# Patient Record
Sex: FEMALE | Race: WHITE | NOT HISPANIC OR LATINO | Employment: OTHER | URBAN - METROPOLITAN AREA
[De-identification: names, ages, dates, MRNs, and addresses within clinical notes are randomized per-mention and may not be internally consistent; named-entity substitution may affect disease eponyms.]

---

## 2021-07-13 ENCOUNTER — HOSPITAL ENCOUNTER (EMERGENCY)
Facility: HOSPITAL | Age: 71
Discharge: HOME | End: 2021-07-13
Attending: EMERGENCY MEDICINE | Admitting: EMERGENCY MEDICINE
Payer: COMMERCIAL

## 2021-07-13 ENCOUNTER — APPOINTMENT (EMERGENCY)
Dept: RADIOLOGY | Facility: HOSPITAL | Age: 71
End: 2021-07-13
Payer: COMMERCIAL

## 2021-07-13 ENCOUNTER — APPOINTMENT (EMERGENCY)
Dept: CT IMAGING | Facility: HOSPITAL | Age: 71
End: 2021-07-13
Payer: COMMERCIAL

## 2021-07-13 VITALS
RESPIRATION RATE: 18 BRPM | TEMPERATURE: 97.4 F | SYSTOLIC BLOOD PRESSURE: 157 MMHG | OXYGEN SATURATION: 96 % | DIASTOLIC BLOOD PRESSURE: 65 MMHG | HEART RATE: 65 BPM
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
## 2021-07-13 LAB
ALBUMIN SERPL BCP-MCNC: 3.7 G/DL (ref 3.5–5)
ALP SERPL-CCNC: 60 U/L (ref 46–116)
ALT SERPL W P-5'-P-CCNC: 28 U/L (ref 12–78)
ANION GAP SERPL CALCULATED.3IONS-SCNC: 9 MMOL/L (ref 4–13)
AST SERPL W P-5'-P-CCNC: 34 U/L (ref 5–45)
ATRIAL RATE: 53 BPM
ATRIAL RATE: 53 BPM
BASE EX.OXY STD BLDV CALC-SCNC: 48.7 % (ref 60–80)
BASE EXCESS BLDV CALC-SCNC: 1 MMOL/L
BASOPHILS # BLD AUTO: 0.03 THOUSANDS/ΜL (ref 0–0.1)
BASOPHILS NFR BLD AUTO: 0 % (ref 0–1)
BILIRUB SERPL-MCNC: 0.28 MG/DL (ref 0.2–1)
BILIRUB UR QL STRIP: NEGATIVE
BUN SERPL-MCNC: 26 MG/DL (ref 5–25)
CALCIUM SERPL-MCNC: 9.4 MG/DL (ref 8.3–10.1)
CHLORIDE SERPL-SCNC: 105 MMOL/L (ref 100–108)
CLARITY UR: CLEAR
CO2 SERPL-SCNC: 29 MMOL/L (ref 21–32)
COLOR UR: YELLOW
CREAT SERPL-MCNC: 1.16 MG/DL (ref 0.6–1.3)
EOSINOPHIL # BLD AUTO: 0.07 THOUSAND/ΜL (ref 0–0.61)
EOSINOPHIL NFR BLD AUTO: 1 % (ref 0–6)
ERYTHROCYTE [DISTWIDTH] IN BLOOD BY AUTOMATED COUNT: 13.4 % (ref 11.6–15.1)
GFR SERPL CREATININE-BSD FRML MDRD: 48 ML/MIN/1.73SQ M
GLUCOSE SERPL-MCNC: 160 MG/DL (ref 65–140)
GLUCOSE UR STRIP-MCNC: NEGATIVE MG/DL
HCO3 BLDV-SCNC: 28 MMOL/L (ref 24–30)
HCT VFR BLD AUTO: 38.3 % (ref 34.8–46.1)
HGB BLD-MCNC: 12.2 G/DL (ref 11.5–15.4)
HGB UR QL STRIP.AUTO: NEGATIVE
IMM GRANULOCYTES # BLD AUTO: 0.02 THOUSAND/UL (ref 0–0.2)
IMM GRANULOCYTES NFR BLD AUTO: 0 % (ref 0–2)
KETONES UR STRIP-MCNC: NEGATIVE MG/DL
LEUKOCYTE ESTERASE UR QL STRIP: NEGATIVE
LYMPHOCYTES # BLD AUTO: 1.71 THOUSANDS/ΜL (ref 0.6–4.47)
LYMPHOCYTES NFR BLD AUTO: 19 % (ref 14–44)
MAGNESIUM SERPL-MCNC: 1.7 MG/DL (ref 1.6–2.6)
MCH RBC QN AUTO: 28.9 PG (ref 26.8–34.3)
MCHC RBC AUTO-ENTMCNC: 31.9 G/DL (ref 31.4–37.4)
MCV RBC AUTO: 91 FL (ref 82–98)
MONOCYTES # BLD AUTO: 0.75 THOUSAND/ΜL (ref 0.17–1.22)
MONOCYTES NFR BLD AUTO: 8 % (ref 4–12)
NEUTROPHILS # BLD AUTO: 6.66 THOUSANDS/ΜL (ref 1.85–7.62)
NEUTS SEG NFR BLD AUTO: 72 % (ref 43–75)
NITRITE UR QL STRIP: NEGATIVE
NRBC BLD AUTO-RTO: 0 /100 WBCS
O2 CT BLDV-SCNC: 9 ML/DL
P AXIS: 64 DEGREES
P AXIS: 69 DEGREES
PCO2 BLDV: 54.7 MM HG (ref 42–50)
PH BLDV: 7.33 [PH] (ref 7.3–7.4)
PH UR STRIP.AUTO: 6 [PH]
PHOSPHATE SERPL-MCNC: 4.7 MG/DL (ref 2.3–4.1)
PLATELET # BLD AUTO: 226 THOUSANDS/UL (ref 149–390)
PMV BLD AUTO: 11.1 FL (ref 8.9–12.7)
PO2 BLDV: 28.4 MM HG (ref 35–45)
POTASSIUM SERPL-SCNC: 4 MMOL/L (ref 3.5–5.3)
PR INTERVAL: 186 MS
PR INTERVAL: 210 MS
PROT SERPL-MCNC: 7 G/DL (ref 6.4–8.2)
PROT UR STRIP-MCNC: NEGATIVE MG/DL
QRS AXIS: 39 DEGREES
QRS AXIS: 77 DEGREES
QRSD INTERVAL: 114 MS
QRSD INTERVAL: 122 MS
QT INTERVAL: 464 MS
QT INTERVAL: 466 MS
QTC INTERVAL: 435 MS
QTC INTERVAL: 437 MS
RBC # BLD AUTO: 4.22 MILLION/UL (ref 3.81–5.12)
SODIUM SERPL-SCNC: 143 MMOL/L (ref 136–145)
SP GR UR STRIP.AUTO: 1.02 (ref 1–1.03)
T WAVE AXIS: 55 DEGREES
T WAVE AXIS: 66 DEGREES
TROPONIN I SERPL-MCNC: <0.02 NG/ML
TSH SERPL DL<=0.05 MIU/L-ACNC: 1.33 UIU/ML (ref 0.36–3.74)
UROBILINOGEN UR QL STRIP.AUTO: 0.2 E.U./DL
VENTRICULAR RATE: 53 BPM
VENTRICULAR RATE: 53 BPM
WBC # BLD AUTO: 9.24 THOUSAND/UL (ref 4.31–10.16)

## 2021-07-13 PROCEDURE — 96360 HYDRATION IV INFUSION INIT: CPT

## 2021-07-13 PROCEDURE — 70450 CT HEAD/BRAIN W/O DYE: CPT

## 2021-07-13 PROCEDURE — 93005 ELECTROCARDIOGRAM TRACING: CPT

## 2021-07-13 PROCEDURE — 99285 EMERGENCY DEPT VISIT HI MDM: CPT | Performed by: EMERGENCY MEDICINE

## 2021-07-13 PROCEDURE — 84484 ASSAY OF TROPONIN QUANT: CPT | Performed by: EMERGENCY MEDICINE

## 2021-07-13 PROCEDURE — 82805 BLOOD GASES W/O2 SATURATION: CPT | Performed by: EMERGENCY MEDICINE

## 2021-07-13 PROCEDURE — 71046 X-RAY EXAM CHEST 2 VIEWS: CPT

## 2021-07-13 PROCEDURE — 93010 ELECTROCARDIOGRAM REPORT: CPT | Performed by: INTERNAL MEDICINE

## 2021-07-13 PROCEDURE — 99285 EMERGENCY DEPT VISIT HI MDM: CPT

## 2021-07-13 PROCEDURE — 36415 COLL VENOUS BLD VENIPUNCTURE: CPT | Performed by: EMERGENCY MEDICINE

## 2021-07-13 PROCEDURE — 84443 ASSAY THYROID STIM HORMONE: CPT | Performed by: EMERGENCY MEDICINE

## 2021-07-13 PROCEDURE — 85025 COMPLETE CBC W/AUTO DIFF WBC: CPT | Performed by: EMERGENCY MEDICINE

## 2021-07-13 PROCEDURE — 84100 ASSAY OF PHOSPHORUS: CPT | Performed by: EMERGENCY MEDICINE

## 2021-07-13 PROCEDURE — 81003 URINALYSIS AUTO W/O SCOPE: CPT | Performed by: EMERGENCY MEDICINE

## 2021-07-13 PROCEDURE — 80053 COMPREHEN METABOLIC PANEL: CPT | Performed by: EMERGENCY MEDICINE

## 2021-07-13 PROCEDURE — 83735 ASSAY OF MAGNESIUM: CPT | Performed by: EMERGENCY MEDICINE

## 2021-07-13 RX ORDER — GLIMEPIRIDE 4 MG/1
4 TABLET ORAL
COMMUNITY

## 2021-07-13 RX ORDER — GABAPENTIN 600 MG/1
300 TABLET ORAL 2 TIMES DAILY
COMMUNITY

## 2021-07-13 RX ORDER — METOPROLOL SUCCINATE 50 MG/1
75 TABLET, EXTENDED RELEASE ORAL DAILY
COMMUNITY

## 2021-07-13 RX ORDER — HYDROCHLOROTHIAZIDE 12.5 MG/1
12.5 TABLET ORAL DAILY
COMMUNITY

## 2021-07-13 RX ORDER — OXYBUTYNIN CHLORIDE 5 MG/1
5 TABLET, EXTENDED RELEASE ORAL DAILY
COMMUNITY

## 2021-07-13 RX ORDER — CLONAZEPAM 0.5 MG/1
0.5 TABLET ORAL 3 TIMES DAILY PRN
COMMUNITY
End: 2021-08-21

## 2021-07-13 RX ORDER — IRBESARTAN 300 MG/1
300 TABLET ORAL
COMMUNITY
End: 2021-08-21

## 2021-07-13 RX ORDER — ATORVASTATIN CALCIUM 20 MG/1
20 TABLET, FILM COATED ORAL DAILY
COMMUNITY
End: 2021-08-21

## 2021-07-13 RX ORDER — OMEPRAZOLE 40 MG/1
40 CAPSULE, DELAYED RELEASE ORAL DAILY
COMMUNITY

## 2021-07-13 RX ORDER — FENOFIBRATE 145 MG/1
145 TABLET, COATED ORAL DAILY
COMMUNITY
End: 2021-08-21

## 2021-07-13 RX ADMIN — SODIUM CHLORIDE 1000 ML: 0.9 INJECTION, SOLUTION INTRAVENOUS at 18:44

## 2021-07-13 NOTE — ED NOTES
Pt  Informed that we need a urine sample pt  States "I don't have to go "  Family aware that we are waiting for a urine sample and if the urine is free of infection the doctor plans on sending her home  Pt  Son states "NO! I will not take her home, she needs 24 hour care and I have a life I can't do that " This RN spoke with pt  About calling [facility] and he states "I have done that for two months, they want money, this is neglect she can't stay here, I can't take care of her "  Son will not allow this RN to talk  He is talking about her accounts that were hacked and he was going to get POA today but she fell  Pt  Son states "this has been going on for about 6 months but over the past 36 hours she has gotten worse and you people want to send her home "  This RN informed pt  Son that pt  Is able to walk on her own         Tammie Godinez RN  07/13/21 0087

## 2021-07-13 NOTE — ED NOTES
Pt  Flor Olivares to stand on her own with no assistance, Pt  Able to ambulate with a steady gait approximately 6 feet in room, pt  able to return to bed and lift her own legs into bed without assistance         Mary Jane Domingo RN  07/13/21 2784

## 2021-07-13 NOTE — ED NOTES
Pt  Son states "this has been going on for about 6 months to a year but has been getting a little worse lately "      Aki Mercer, JAMES  07/13/21 1600

## 2021-07-13 NOTE — ED PROVIDER NOTES
History  Chief Complaint   Patient presents with    Dizziness     Per son patient has been having difficulty walking and with balance over the last 36 hours  Son states he found his mother on the floor this morning, per patient she fell last evening and couldn't get up     Difficulty Walking    Fall     Patient is a 81-year-old female  She has been having problems for about a year  She has had increased difficulty ambulating  She has been shaky  She has been falling  Denies injury  She has also been falling asleep readily  Sometime she will be sitting in a chair, fall asleep, and fall over  There has been some confusion  Family members are worried about possibility of dementia  There are no lateralizing motor or sensory complaints  No visual or speech problems  No vertigo  She denies feeling dizzy  She has been fatigued  She smokes  No alcohol or drug abuse  There is a history of colon cancer, diabetes, hypertension and melanoma  There has not been any weight loss  She has had a good appetite  No fever or cough  Prior to Admission Medications   Prescriptions Last Dose Informant Patient Reported?  Taking?   atorvastatin (LIPITOR) 20 mg tablet 7/13/2021 at Unknown time Child Yes Yes   Sig: Take 20 mg by mouth daily   clonazePAM (KlonoPIN) 0 5 mg tablet  Child Yes Yes   Sig: Take 0 5 mg by mouth 3 (three) times a day as needed for seizures   fenofibrate (TRICOR) 145 mg tablet 7/13/2021 at Unknown time Child Yes Yes   Sig: Take 145 mg by mouth daily   gabapentin (NEURONTIN) 600 MG tablet  Child Yes Yes   Sig: Take 600 mg by mouth 2 (two) times a day   glimepiride (AMARYL) 4 mg tablet 7/13/2021 at Unknown time Child Yes Yes   Sig: Take 4 mg by mouth daily with breakfast   hydrochlorothiazide (HYDRODIURIL) 12 5 mg tablet 7/13/2021 at Unknown time Child Yes Yes   Sig: Take 12 5 mg by mouth daily   irbesartan (AVAPRO) 300 mg tablet 7/12/2021 at Unknown time Child Yes Yes   Sig: Take 300 mg by mouth daily at bedtime   metFORMIN (GLUCOPHAGE) 1000 MG tablet 7/13/2021 at Unknown time Child Yes Yes   Sig: Take 1,000 mg by mouth 2 (two) times a day with meals   metoprolol succinate (TOPROL-XL) 50 mg 24 hr tablet  Child Yes Yes   Sig: Take 75 mg by mouth daily   omeprazole (PriLOSEC) 40 MG capsule  Child Yes Yes   Sig: Take 40 mg by mouth daily   oxybutynin (DITROPAN-XL) 5 mg 24 hr tablet 7/12/2021 at Unknown time Child Yes Yes   Sig: Take 5 mg by mouth daily   sitaGLIPtin (JANUVIA) 100 mg tablet  Child Yes Yes   Sig: Take 100 mg by mouth daily      Facility-Administered Medications: None       Past Medical History:   Diagnosis Date    [facility] ([facility] )     Diabetes mellitus ([facility] )     Hypertension     Skin cancer (melanoma) ([place] [address] )        Past Surgical History:   Procedure Laterality Date    APPENDECTOMY      BACK SURGERY      CHOLECYSTECTOMY         History reviewed  No pertinent family history  I have reviewed and agree with the history as documented  E-Cigarette/Vaping     E-Cigarette/Vaping Substances     Social History     Tobacco Use    Smoking status: Current Every Day Smoker    Smokeless tobacco: Never Used   Substance Use Topics    Alcohol use: Never    Drug use: Never       Review of Systems   Constitutional: Negative for chills and fever  HENT: Negative for rhinorrhea and sore throat  Eyes: Negative for pain, redness and visual disturbance  Respiratory: Negative for cough and shortness of breath  Cardiovascular: Negative for chest pain and leg swelling  Gastrointestinal: Negative for abdominal pain, diarrhea and vomiting  Endocrine: Negative for polydipsia and polyuria  Genitourinary: Negative for dysuria, enuresis, frequency, hematuria and vaginal bleeding  Musculoskeletal: Negative for back pain and neck pain  Skin: Negative for rash and wound  Allergic/Immunologic: Negative for immunocompromised state  Neurological: Positive for weakness   Negative for numbness and headaches  Hematological: Does not bruise/bleed easily  Psychiatric/Behavioral: Negative for hallucinations and suicidal ideas  All other systems reviewed and are negative  Physical Exam  Physical Exam  Vitals reviewed  Constitutional:       General: She is not in acute distress  HENT:      Head: Normocephalic and atraumatic  Nose: Nose normal       Mouth/Throat:      Mouth: Mucous membranes are moist    Eyes:      General:         Right eye: No discharge  Left eye: No discharge  Extraocular Movements: Extraocular movements intact  Conjunctiva/sclera: Conjunctivae normal       Pupils: Pupils are equal, round, and reactive to light  Cardiovascular:      Rate and Rhythm: Normal rate and regular rhythm  Pulses: Normal pulses  Heart sounds: Normal heart sounds  No murmur heard  No friction rub  No gallop  Pulmonary:      Effort: Pulmonary effort is normal  No respiratory distress  Breath sounds: Normal breath sounds  No stridor  No wheezing, rhonchi or rales  Abdominal:      General: Bowel sounds are normal  There is no distension  Palpations: Abdomen is soft  Tenderness: There is no abdominal tenderness  There is no right CVA tenderness, left CVA tenderness, guarding or rebound  Musculoskeletal:         General: No swelling, tenderness, deformity or signs of injury  Normal range of motion  Cervical back: Normal range of motion and neck supple  No rigidity  Right lower leg: No edema  Left lower leg: No edema  Comments: No calf tenderness or unilateral leg swelling  Skin:     General: Skin is warm and dry  Coloration: Skin is not jaundiced  Findings: No rash  Neurological:      General: No focal deficit present  Mental Status: She is alert and oriented to person, place, and time  Sensory: No sensory deficit  Motor: Motor function is intact     Psychiatric:         Mood and Affect: Mood normal  Behavior: Behavior normal          Vital Signs  ED Triage Vitals [07/13/21 1355]   Temperature Pulse Respirations Blood Pressure SpO2   (!) 97 4 °F (36 3 °C) (!) 53 18 (!) 151/108 100 %      Temp Source Heart Rate Source Patient Position - Orthostatic VS BP Location FiO2 (%)   Temporal Monitor Sitting Left arm --      Pain Score       --           Vitals:    07/13/21 1630 07/13/21 1717 07/13/21 1720 07/13/21 1722   BP: (!) 180/73 158/70 161/74 151/65   Pulse: (!) 50 (!) 52 66 65   Patient Position - Orthostatic VS: Sitting Lying - Orthostatic VS Sitting - Orthostatic VS Standing for 3 minutes - Orthostatic VS         Visual Acuity  Visual Acuity      Most Recent Value   L Pupil Size (mm)  3   R Pupil Size (mm)  3          ED Medications  Medications   sodium chloride 0 9 % bolus 1,000 mL (1,000 mL Intravenous New Bag 7/13/21 1844)       Diagnostic Studies  Results Reviewed     Procedure Component Value Units Date/Time    TSH [[id number]]  (Normal) Collected: 07/13/21 1540    Lab Status: Final result Specimen: Blood from Arm, Left Updated: 07/13/21 1616     TSH 3RD GENERATON 1 333 uIU/mL     Narrative:      Patients undergoing fluorescein dye angiography may retain small amounts of fluorescein in the body for 48-72 hours post procedure  Samples containing fluorescein can produce falsely depressed TSH values  If the patient had this procedure,a specimen should be resubmitted post fluorescein clearance        Magnesium [[id number]]  (Normal) Collected: 07/13/21 1540    Lab Status: Final result Specimen: Blood from Arm, Left Updated: 07/13/21 1616     Magnesium 1 7 mg/dL     Phosphorus [[id number]]  (Abnormal) Collected: 07/13/21 1540    Lab Status: Final result Specimen: Blood from Arm, Left Updated: 07/13/21 1616     Phosphorus 4 7 mg/dL     Comprehensive metabolic panel [[id number]]  (Abnormal) Collected: 07/13/21 1540    Lab Status: Final result Specimen: Blood from Arm, Left Updated: 07/13/21 1608     Sodium 143 mmol/L      Potassium 4 0 mmol/L      Chloride 105 mmol/L      CO2 29 mmol/L      ANION GAP 9 mmol/L      BUN 26 mg/dL      Creatinine 1 16 mg/dL      Glucose 160 mg/dL      Calcium 9 4 mg/dL      AST 34 U/L      ALT 28 U/L      Alkaline Phosphatase 60 U/L      Total Protein 7 0 g/dL      Albumin 3 7 g/dL      Total Bilirubin 0 28 mg/dL      eGFR 48 ml/min/1 73sq m     Narrative:      National Kid[employer] Disease Foundation guidelines for Chronic Kidney Disease (CKD):     Stage 1 with normal or high GFR (GFR > 90 mL/min/1 73 square meters)    Stage 2 Mild CKD (GFR = 60-89 mL/min/1 73 square meters)    Stage 3A Moderate CKD (GFR = 45-59 mL/min/1 73 square meters)    Stage 3B Moderate CKD (GFR = 30-44 mL/min/1 73 square meters)    Stage 4 Severe CKD (GFR = 15-29 mL/min/1 73 square meters)    Stage 5 End Stage CKD (GFR <15 mL/min/1 73 square meters)  Note: GFR calculation is accurate only with a steady state creatinine    Troponin I [[id number]]  (Normal) Collected: 07/13/21 1540    Lab Status: Final result Specimen: Blood from Arm, Left Updated: 07/13/21 1604     Troponin I <0 02 ng/mL     Blood gas, venous [[id number]]  (Abnormal) Collected: 07/13/21 1540    Lab Status: Final result Specimen: Blood from Arm, Left Updated: 07/13/21 1548     pH, Torres 7 327     pCO2, Torres 54 7 mm Hg      pO2, Torres 28 4 mm Hg      HCO3, Torres 28 0 mmol/L      Base Excess, Torres 1 0 mmol/L      O2 Content, Torres 9 0 ml/dL      O2 HGB, VENOUS 48 7 %     CBC and differential [[id number]] Collected: 07/13/21 1540    Lab Status: Final result Specimen: Blood from Arm, Left Updated: 07/13/21 1547     WBC 9 24 Thousand/uL      RBC 4 22 Million/uL      Hemoglobin 12 2 g/dL      Hematocrit 38 3 %      MCV 91 fL      MCH 28 9 pg      MCHC 31 9 g/dL      RDW 13 4 %      MPV 11 1 fL      Platelets 222 Thousands/uL      nRBC 0 /100 WBCs      Neutrophils Relative 72 %      Immat GRANS % 0 %      Lymphocytes Relative 19 %      Monocytes Relative 8 % Eosinophils Relative 1 %      Basophils Relative 0 %      Neutrophils Absolute 6 66 Thousands/µL      Immature Grans Absolute 0 02 Thousand/uL      Lymphocytes Absolute 1 71 Thousands/µL      Monocytes Absolute 0 75 Thousand/µL      Eosinophils Absolute 0 07 Thousand/µL      Basophils Absolute 0 03 Thousands/µL     UA w Reflex to Microscopic w Reflex to Culture [[id number]]     Lab Status: No result Specimen: Urine                  XR chest 2 views   ED Interpretation by Cheral Lefort, MD (07/13 1813)   No masses  No infiltrates  CT head without contrast   Final Result by Domenica Patino MD (07/13 1538)      No acute intracranial abnormality  Microangiopathic changes  Workstation performed: [id number]                    Procedures  ECG 12 Lead Documentation Only    Date/Time: 7/13/2021 6:51 PM  Performed by: Cheral Lefort, MD  Authorized by: Cheral Lefort, MD     ECG reviewed by me, the ED Provider: yes    Patient location:  ED  Comments:      Sinus bradycardia with 1st degree A-V block  Non-specific intra-ventricular conduction delay  Borderline ECG  No previous ECGs available             ED Course                             SBIRT 20yo+      Most Recent Value   SBIRT (25 yo +)   In order to provide better care to our patients, we are screening all of our patients for alcohol and drug use  Would it be okay to ask you these screening questions? Yes Filed at: 07/13/2021 1617   Initial Alcohol Screen: US AUDIT-C    1  How often do you have a drink containing alcohol?  0 Filed at: 07/13/2021 1617   2  How many drinks containing alcohol do you have on a typical day you are drinking? 0 Filed at: 07/13/2021 1617   3b  FEMALE Any Age, or MALE 65+: How often do you have 4 or more drinks on one occassion? 0 Filed at: 07/13/2021 1617   Audit-C Score  0 Filed at: 07/13/2021 1617   DIANA: How many times in the past year have you       Used an illegal drug or used a prescription medication for non-medical reasons? Never Filed at: 07/13/2021 1617                    MDM    Disposition  Final diagnoses:   None     ED Disposition     None      Follow-up Information    None         Patient's Medications   Discharge Prescriptions    No medications on file     No discharge procedures on file      PDMP Review     None          ED Provider  Electronically Signed by           Clementina Shea MD  07/13/21 9030

## 2021-07-19 ENCOUNTER — TELEPHONE (OUTPATIENT)
Dept: NEUROLOGY | Facility: CLINIC | Age: 71
End: 2021-07-19

## 2021-07-19 NOTE — TELEPHONE ENCOUNTER
Best contact number for patient:     Emergency Contact name and number:None    Referring provider and telephone number: Self Ref    Primary Care Provider Name and if affiliated with [facility]: Lucia Hassan    Reason for Appointment/Dx:Tremor Akosua Manners     Have you seen and followed up with a pediatric Neurologist for this disease in the past?  No     (If yes ok to schedule with Dr Rivera Members)    Neurology Location patient would like to be seen:[place]    Order received? Self Ref                                                 Records Received? No    Have you ever seen another Neurologist?       No      Insurance [address] [facility]     ID/Policy #:[id number]    Secondary Insurance:    ID/Policy#: Workman's Comp/ Accident/ School  Information      Workman's Comp/Accident/School related?        None     If yes name of Insurance company:    Claim #:    Date of Injury:    Type of Injury:     Name and Telephone Number:    Notes:Appointment schedule with patient insurance confimed                    Appointment date: 11-23-21 3:30pm with Dr Julianne Slaughter

## 2021-08-21 ENCOUNTER — APPOINTMENT (EMERGENCY)
Dept: RADIOLOGY | Facility: HOSPITAL | Age: 71
End: 2021-08-21
Payer: COMMERCIAL

## 2021-08-21 ENCOUNTER — HOSPITAL ENCOUNTER (EMERGENCY)
Facility: HOSPITAL | Age: 71
Discharge: HOME | End: 2021-08-21
Attending: EMERGENCY MEDICINE | Admitting: EMERGENCY MEDICINE
Payer: COMMERCIAL

## 2021-08-21 VITALS
OXYGEN SATURATION: 98 % | SYSTOLIC BLOOD PRESSURE: 116 MMHG | HEART RATE: 68 BPM | TEMPERATURE: 97.9 F | RESPIRATION RATE: 18 BRPM | HEIGHT: 62 IN | DIASTOLIC BLOOD PRESSURE: 63 MMHG

## 2021-08-21 DIAGNOSIS — M25.461 KNEE EFFUSION, RIGHT: ICD-10-CM

## 2021-08-21 DIAGNOSIS — M25.561 RIGHT KNEE PAIN: Primary | ICD-10-CM

## 2021-08-21 PROCEDURE — 99284 EMERGENCY DEPT VISIT MOD MDM: CPT | Performed by: EMERGENCY MEDICINE

## 2021-08-21 PROCEDURE — 73564 X-RAY EXAM KNEE 4 OR MORE: CPT

## 2021-08-21 PROCEDURE — 99283 EMERGENCY DEPT VISIT LOW MDM: CPT

## 2021-08-21 RX ORDER — ESCITALOPRAM OXALATE 20 MG/1
20 TABLET ORAL DAILY
COMMUNITY
End: 2021-08-21

## 2021-08-21 RX ORDER — MAGNESIUM OXIDE 400 MG/1
400 TABLET ORAL DAILY
COMMUNITY
Start: 2021-06-22 | End: 2022-06-22

## 2021-08-21 RX ORDER — ALBUTEROL SULFATE 90 UG/1
AEROSOL, METERED RESPIRATORY (INHALATION)
Status: ON HOLD | COMMUNITY
Start: 2021-07-11 | End: 2022-03-01

## 2021-08-21 RX ORDER — BUTALBITAL, ACETAMINOPHEN AND CAFFEINE 50; 325; 40 MG/1; MG/1; MG/1
1 TABLET ORAL EVERY 4 HOURS PRN
COMMUNITY
End: 2022-02-09

## 2021-08-21 RX ORDER — AMITRIPTYLINE HYDROCHLORIDE 50 MG/1
50 TABLET, FILM COATED ORAL
COMMUNITY
Start: 2021-06-04 | End: 2021-08-21

## 2021-08-21 RX ORDER — AMLODIPINE BESYLATE 2.5 MG/1
2.5 TABLET ORAL DAILY
COMMUNITY
Start: 2021-07-27

## 2021-08-21 RX ORDER — AMITRIPTYLINE HYDROCHLORIDE 50 MG/1
50 TABLET, FILM COATED ORAL
Status: ON HOLD | COMMUNITY
End: 2022-04-19 | Stop reason: ALTCHOICE

## 2021-08-21 RX ORDER — IRBESARTAN 300 MG/1
300 TABLET ORAL DAILY
COMMUNITY
Start: 2021-07-21

## 2021-08-21 NOTE — ED PROVIDER NOTES
Pt Name: Lai Eastman  MRN: [id number]  [place] 1950  Age/Sex: 78 y o  female  Date of evaluation: 8/21/2021  PCP: Avery Villegas MD    [address] [place] [address]    Chief Complaint   Patient presents with    Knee Swelling     Patient reports R knee swelling starting about 6 weeks ago  HPI    78 y o  female presenting with right knee pain and swelling  Patient states she fell approximately 6 weeks ago, landing on the right knee  She states since then she had swelling and pain  The pain is mild to moderate, dull, on the lateral aspect of the knee, worse with walking or pressing on and better at rest   Patient is still able to walk without difficulty or assistance  She presents today because she feels the swelling is been going on too long  She denies fever, nausea, vomiting, diarrhea, numbness, weakness, other symptoms  HPI      Past Medical and Surgical History    Past Medical History:   Diagnosis Date    Colon cancer ([facility] )     Diabetes mellitus ([facility] )     Hypertension     Skin cancer (melanoma) ([facility] )        Past Surgical History:   Procedure Laterality Date    APPENDECTOMY      BACK SURGERY      CHOLECYSTECTOMY         History reviewed  No pertinent family history  Social History     Tobacco Use    Smoking status: Current Every Day Smoker    Smokeless tobacco: Never Used   Substance Use Topics    Alcohol use: Never    Drug use: Never           Allergies    Allergies   Allergen Reactions    Codeine GI Intolerance    Morphine GI Intolerance       Home Medications    Prior to Admission medications    Medication Sig Start Date End Date Taking?  Authorizing Provider   gabapentin (NEURONTIN) 600 MG tablet Take 600 mg by mouth 2 (two) times a day    Historical Provider, MD   glimepiride (AMARYL) 4 mg tablet Take 4 mg by mouth daily with breakfast    Historical Provider, MD   hydrochlorothiazide (HYDRODIURIL) 12 5 mg tablet Take 12 5 mg by mouth daily    Historical Provider, MD irbesartan (AVAPRO) 300 mg tablet Take 300 mg by mouth daily at bedtime    Historical Provider, MD   metFORMIN (GLUCOPHAGE) 1000 MG tablet Take 1,000 mg by mouth 2 (two) times a day with meals    Historical Provider, MD   metoprolol succinate (TOPROL-XL) 50 mg 24 hr tablet Take 75 mg by mouth daily    Historical Provider, MD   omeprazole (PriLOSEC) 40 MG capsule Take 40 mg by mouth daily    Historical Provider, MD   oxybutynin (DITROPAN-XL) 5 mg 24 hr tablet Take 5 mg by mouth daily    Historical Provider, MD   sitaGLIPtin (JANUVIA) 100 mg tablet Take 100 mg by mouth daily    Historical Provider, MD   atorvastatin (LIPITOR) 20 mg tablet Take 20 mg by mouth daily  8/21/21  Historical Provider, MD   clonazePAM (KlonoPIN) 0 5 mg tablet Take 0 5 mg by mouth 3 (three) times a day as needed for seizures  8/21/21  Historical Provider, MD   fenofibrate (TRICOR) 145 mg tablet Take 145 mg by mouth daily  8/21/21  Historical Provider, MD           Review of Systems    Review of Systems   Constitutional: Negative for activity change, chills and fever  HENT: Negative for drooling and facial swelling  Eyes: Negative for pain, discharge and visual disturbance  Respiratory: Negative for apnea, cough, chest tightness, shortness of breath and wheezing  Cardiovascular: Negative for chest pain and leg swelling  Gastrointestinal: Negative for abdominal pain, constipation, diarrhea, nausea and vomiting  Genitourinary: Negative for difficulty urinating, dysuria and urgency  Musculoskeletal: Positive for arthralgias and joint swelling  Negative for back pain and gait problem  Skin: Negative for color change and rash  Neurological: Negative for dizziness, speech difficulty, weakness and headaches  Psychiatric/Behavioral: Negative for agitation, behavioral problems and confusion  All other systems reviewed and negative      Physical Exam      ED Triage Vitals [08/21/21 1701]   Temperature Pulse Respirations Blood Pressure SpO2   97 9 °F (36 6 °C) 68 18 116/63 98 %      Temp Source Heart Rate Source Patient Position - Orthostatic VS BP Location FiO2 (%)   Temporal Monitor Sitting Left arm --      Pain Score       7               Physical Exam  Vitals and nursing note reviewed  Constitutional:       Appearance: She is well-developed  HENT:      Head: Normocephalic and atraumatic  Right Ear: External ear normal       Left Ear: External ear normal    Eyes:      Conjunctiva/sclera: Conjunctivae normal       Pupils: Pupils are equal, round, and reactive to light  Cardiovascular:      Rate and Rhythm: Normal rate and regular rhythm  Heart sounds: Normal heart sounds  Pulmonary:      Effort: Pulmonary effort is normal  No respiratory distress  Breath sounds: Normal breath sounds  No wheezing or rales  Abdominal:      General: There is no distension  Palpations: Abdomen is soft  Tenderness: There is no abdominal tenderness  There is no guarding or rebound  Musculoskeletal:         General: Swelling and tenderness present  No deformity  Normal range of motion  Cervical back: Normal range of motion and neck supple  Comments: Right knee swollen, mildly tender to palpation about the lateral aspect  Negative Piter, no pain with axial loading, no pain with varus or valgus stress or with anterior drawer  Strength sensation pulse and cap refill intact  No erythema or other skin changes  Full active and passive range of motion  Skin:     General: Skin is warm and dry  Findings: No erythema or rash  Neurological:      Mental Status: She is alert and oriented to person, place, and time  Psychiatric:         Behavior: Behavior normal          Thought Content:  Thought content normal          Judgment: Judgment normal               Diagnostic Results      Labs:    Results Reviewed     None          All labs reviewed and utilized in the medical decision making process    Radiology:    XR knee 4+ vw right injury    (Results Pending)       All radiology studies independently viewed by me and interpreted by the radiologist     Procedure    Procedures        ED Course of Care and Re-Assessments      Patient offered crutches but declined  Medications - No data to display        FINAL IMPRESSION    Final diagnoses:   Right knee pain   Knee effusion, right         DISPOSITION/PLAN    Right knee pain and effusion as above  Plain films performed and showed no acute fracture or dislocation, no unstable injury noted  Do not suspect septic arthritis or significant neurovascular compromise at this time  Treated symptomatically, discharged strict return precautions follow up Orthopedics  Time reflects when diagnosis was documented in both MDM as applicable and the Disposition within this note     Time User Action Codes Description Comment    8/21/2021  7:25 PM Nicholas Blades Add [O38 [id number]] Right knee pain     8/21/2021  7:25 PM Nicholas Blades Add [N06 [id number]] Knee effusion, right       ED Disposition     ED Disposition Condition Date/Time Comment    Discharge Stable Sat Aug 21, 2021  7:25 PM Edgar Santillan discharge to home/self care              Follow-up Information     Follow up With Specialties Details Why Contact Info Additional [address] [facility] Emergency Department Emergency Medicine Go to  If symptoms worsen [address] [facility] [address] [facility] Emergency Department, [address] [place], [place], [address] Shirley Brown MD Internal Medicine Call  As needed [address]  [phone]       1 [facility] Orthopedic Surgery Call in 2 days To schedule close outpatient follow-up for your knee injury [address] [facility] [address] Alvarez  [facility] [place], [address] [address] [facility], [place], [place], [address]            PATIENT REFERRED TO:    [facility] Emergency Department  [address] [address] [phone]  Go to   If symptoms worsen    Teodora Kiran MD  [address] [facility]  [phone]    Call   As needed    [address] [facility] [place]  [address] [place] 91  [phone]  Call in 2 days  To schedule close outpatient follow-up for your knee injury      DISCHARGE MEDICATIONS:    Discharge Medication List as of 8/21/2021  7:26 PM      START taking these medications    Details   Diclofenac Sodium (VOLTAREN) 1 % Apply 2 g topically 4 (four) times a day, Starting Sat 8/21/2021, Print         CONTINUE these medications which have NOT CHANGED    Details   amitriptyline (ELAVIL) 50 mg tablet Take 50 mg by mouth daily at bedtime, Historical Med      amLODIPine (NORVASC) 2 5 mg tablet Take 2 5 mg by mouth daily, Starting Tue 7/27/2021, Historical Med      irbesartan (AVAPRO) 300 mg tablet Take 300 mg by mouth daily, Starting Wed 7/21/2021, Historical Med      magnesium oxide (MAG-OX) 400 mg tablet Take 400 mg by mouth daily, Starting Tue 6/22/2021, Until Wed 6/22/2022, Historical Med      albuterol (PROVENTIL HFA,VENTOLIN HFA) 90 mcg/act inhaler INHALE 2 PUFFS BY MOUTH EVERY 4-6 HOURS AS NEEDED FOR SHORTNESS OF BREATH OR WHEEZING, Historical Med      butalbital-acetaminophen-caffeine (FIORICET,ESGIC) -40 mg per tablet Take 1 tablet by mouth every 4 (four) hours as needed, Historical Med      gabapentin (NEURONTIN) 600 MG tablet Take 300 mg by mouth 2 (two) times a day , Historical Med      glimepiride (AMARYL) 4 mg tablet Take 4 mg by mouth daily with breakfast, Historical Med      hydrochlorothiazide (HYDRODIURIL) 12 5 mg tablet Take 12 5 mg by mouth daily, Historical Med      metFORMIN (GLUCOPHAGE) 1000 MG tablet Take 1,000 mg by mouth daily with breakfast , Historical Med      metoprolol succinate (TOPROL-XL) 50 mg 24 hr tablet Take 75 mg by mouth daily, Historical Med      omeprazole (PriLOSEC) 40 MG capsule Take 40 mg by mouth daily, Historical Med      oxybutynin (DITROPAN-XL) 5 mg 24 hr tablet Take 5 mg by mouth daily, Historical Med      sitaGLIPtin (JANUVIA) 100 mg tablet Take 100 mg by mouth daily, Historical Med             No discharge procedures on file           MD Anne Birmingham MD  08/21/21 4119

## 2021-08-25 NOTE — RESULT ENCOUNTER NOTE
Patient's son called and notified of knee x-ray results  Discussed need for MRI to further differentiate the lesion seen on x-ray  Advised f/u with ortho  Son expressed understanding  Call back complete

## 2021-10-29 ENCOUNTER — HOSPITAL ENCOUNTER (OUTPATIENT)
Dept: MAMMOGRAPHY | Facility: CLINIC | Age: 71
Discharge: HOME | End: 2021-10-29
Payer: COMMERCIAL

## 2021-10-29 VITALS — HEIGHT: 62 IN | WEIGHT: 158 LBS | BODY MASS INDEX: 29.08 KG/M2

## 2021-10-29 DIAGNOSIS — Z12.31 ENCOUNTER FOR SCREENING MAMMOGRAM FOR MALIGNANT NEOPLASM OF BREAST: ICD-10-CM

## 2021-10-29 PROCEDURE — 77063 BREAST TOMOSYNTHESIS BI: CPT

## 2021-10-29 PROCEDURE — 77067 SCR MAMMO BI INCL CAD: CPT

## 2021-11-09 ENCOUNTER — TELEPHONE (OUTPATIENT)
Dept: MAMMOGRAPHY | Facility: CLINIC | Age: 71
End: 2021-11-09

## 2021-11-16 ENCOUNTER — HOSPITAL ENCOUNTER (OUTPATIENT)
Dept: ULTRASOUND IMAGING | Facility: CLINIC | Age: 71
Discharge: HOME | End: 2021-11-16
Payer: COMMERCIAL

## 2021-11-16 ENCOUNTER — HOSPITAL ENCOUNTER (OUTPATIENT)
Dept: MAMMOGRAPHY | Facility: CLINIC | Age: 71
Discharge: HOME | End: 2021-11-16
Payer: COMMERCIAL

## 2021-11-16 ENCOUNTER — TELEPHONE (OUTPATIENT)
Dept: NEUROLOGY | Facility: CLINIC | Age: 71
End: 2021-11-16

## 2021-11-16 VITALS — WEIGHT: 158 LBS | HEIGHT: 62 IN | BODY MASS INDEX: 29.08 KG/M2

## 2021-11-16 DIAGNOSIS — R92.8 ABNORMAL MAMMOGRAM: ICD-10-CM

## 2021-11-16 PROCEDURE — G0279 TOMOSYNTHESIS, MAMMO: HCPCS

## 2021-11-16 PROCEDURE — 77065 DX MAMMO INCL CAD UNI: CPT

## 2021-11-16 PROCEDURE — 76642 ULTRASOUND BREAST LIMITED: CPT

## 2021-11-24 ENCOUNTER — OFFICE VISIT (OUTPATIENT)
Dept: NEUROLOGY | Facility: CLINIC | Age: 71
End: 2021-11-24
Payer: COMMERCIAL

## 2021-11-24 VITALS
TEMPERATURE: 96.9 F | HEART RATE: 68 BPM | HEIGHT: 62 IN | WEIGHT: 151 LBS | BODY MASS INDEX: 27.79 KG/M2 | SYSTOLIC BLOOD PRESSURE: 120 MMHG | DIASTOLIC BLOOD PRESSURE: 80 MMHG

## 2021-11-24 DIAGNOSIS — R41.3 MEMORY DIFFICULTY: ICD-10-CM

## 2021-11-24 DIAGNOSIS — R25.1 TREMOR: Primary | ICD-10-CM

## 2021-11-24 PROCEDURE — 99204 OFFICE O/P NEW MOD 45 MIN: CPT | Performed by: PSYCHIATRY & NEUROLOGY

## 2021-11-24 RX ORDER — PEN NEEDLE, DIABETIC 32GX 5/32"
NEEDLE, DISPOSABLE MISCELLANEOUS
COMMUNITY
Start: 2021-10-08 | End: 2022-02-09

## 2021-12-21 ENCOUNTER — HOSPITAL ENCOUNTER (OUTPATIENT)
Dept: MRI IMAGING | Facility: HOSPITAL | Age: 71
Discharge: HOME | End: 2021-12-21
Attending: PSYCHIATRY & NEUROLOGY
Payer: COMMERCIAL

## 2021-12-21 ENCOUNTER — APPOINTMENT (OUTPATIENT)
Dept: LAB | Facility: HOSPITAL | Age: 71
End: 2021-12-21
Payer: COMMERCIAL

## 2021-12-21 DIAGNOSIS — R25.1 TREMOR: ICD-10-CM

## 2021-12-21 DIAGNOSIS — E55.9 VITAMIN D DEFICIENCY: ICD-10-CM

## 2021-12-21 DIAGNOSIS — E11.59 HYPERTENSION ASSOCIATED WITH DIABETES (HCC): ICD-10-CM

## 2021-12-21 DIAGNOSIS — E11.69 TYPE 2 DIABETES MELLITUS WITH HYPERLIPIDEMIA (HCC): ICD-10-CM

## 2021-12-21 DIAGNOSIS — E78.5 TYPE 2 DIABETES MELLITUS WITH HYPERLIPIDEMIA (HCC): ICD-10-CM

## 2021-12-21 DIAGNOSIS — I15.2 HYPERTENSION ASSOCIATED WITH DIABETES (HCC): ICD-10-CM

## 2021-12-21 DIAGNOSIS — R41.3 MEMORY DIFFICULTY: ICD-10-CM

## 2021-12-21 DIAGNOSIS — Z11.59 NEED FOR HEPATITIS C SCREENING TEST: ICD-10-CM

## 2021-12-21 DIAGNOSIS — E78.5 HYPERLIPIDEMIA, UNSPECIFIED HYPERLIPIDEMIA TYPE: ICD-10-CM

## 2021-12-21 LAB
25(OH)D3 SERPL-MCNC: 15 NG/ML (ref 30–100)
ALBUMIN SERPL BCP-MCNC: 3.8 G/DL (ref 3.5–5)
ALP SERPL-CCNC: 59 U/L (ref 46–116)
ALT SERPL W P-5'-P-CCNC: 25 U/L (ref 12–78)
ANION GAP SERPL CALCULATED.3IONS-SCNC: 9 MMOL/L (ref 4–13)
AST SERPL W P-5'-P-CCNC: 14 U/L (ref 5–45)
BASOPHILS # BLD AUTO: 0.03 THOUSANDS/ΜL (ref 0–0.1)
BASOPHILS NFR BLD AUTO: 0 % (ref 0–1)
BILIRUB SERPL-MCNC: 0.29 MG/DL (ref 0.2–1)
BUN SERPL-MCNC: 24 MG/DL (ref 5–25)
CALCIUM SERPL-MCNC: 9.1 MG/DL (ref 8.3–10.1)
CHLORIDE SERPL-SCNC: 97 MMOL/L (ref 100–108)
CHOLEST SERPL-MCNC: 113 MG/DL
CO2 SERPL-SCNC: 30 MMOL/L (ref 21–32)
CREAT SERPL-MCNC: 1.12 MG/DL (ref 0.6–1.3)
EOSINOPHIL # BLD AUTO: 0.1 THOUSAND/ΜL (ref 0–0.61)
EOSINOPHIL NFR BLD AUTO: 1 % (ref 0–6)
ERYTHROCYTE [DISTWIDTH] IN BLOOD BY AUTOMATED COUNT: 12.5 % (ref 11.6–15.1)
FOLATE SERPL-MCNC: >20 NG/ML (ref 3.1–17.5)
GFR SERPL CREATININE-BSD FRML MDRD: 50 ML/MIN/1.73SQ M
GLUCOSE P FAST SERPL-MCNC: 113 MG/DL (ref 65–99)
HCT VFR BLD AUTO: 38.3 % (ref 34.8–46.1)
HDLC SERPL-MCNC: 59 MG/DL
HGB BLD-MCNC: 12.4 G/DL (ref 11.5–15.4)
IMM GRANULOCYTES # BLD AUTO: 0.04 THOUSAND/UL (ref 0–0.2)
IMM GRANULOCYTES NFR BLD AUTO: 1 % (ref 0–2)
LDLC SERPL CALC-MCNC: 37 MG/DL (ref 0–100)
LYMPHOCYTES # BLD AUTO: 2.03 THOUSANDS/ΜL (ref 0.6–4.47)
LYMPHOCYTES NFR BLD AUTO: 23 % (ref 14–44)
MCH RBC QN AUTO: 29 PG (ref 26.8–34.3)
MCHC RBC AUTO-ENTMCNC: 32.4 G/DL (ref 31.4–37.4)
MCV RBC AUTO: 90 FL (ref 82–98)
MONOCYTES # BLD AUTO: 0.92 THOUSAND/ΜL (ref 0.17–1.22)
MONOCYTES NFR BLD AUTO: 10 % (ref 4–12)
NEUTROPHILS # BLD AUTO: 5.74 THOUSANDS/ΜL (ref 1.85–7.62)
NEUTS SEG NFR BLD AUTO: 65 % (ref 43–75)
NONHDLC SERPL-MCNC: 54 MG/DL
NRBC BLD AUTO-RTO: 0 /100 WBCS
PLATELET # BLD AUTO: 321 THOUSANDS/UL (ref 149–390)
PMV BLD AUTO: 10.2 FL (ref 8.9–12.7)
POTASSIUM SERPL-SCNC: 4.6 MMOL/L (ref 3.5–5.3)
PROT SERPL-MCNC: 7 G/DL (ref 6.4–8.2)
RBC # BLD AUTO: 4.27 MILLION/UL (ref 3.81–5.12)
SODIUM SERPL-SCNC: 136 MMOL/L (ref 136–145)
TRIGL SERPL-MCNC: 86 MG/DL
TSH SERPL DL<=0.05 MIU/L-ACNC: 2.18 UIU/ML (ref 0.36–3.74)
VIT B12 SERPL-MCNC: 342 PG/ML (ref 100–900)
WBC # BLD AUTO: 8.86 THOUSAND/UL (ref 4.31–10.16)

## 2021-12-21 PROCEDURE — 80053 COMPREHEN METABOLIC PANEL: CPT

## 2021-12-21 PROCEDURE — 84443 ASSAY THYROID STIM HORMONE: CPT

## 2021-12-21 PROCEDURE — 83036 HEMOGLOBIN GLYCOSYLATED A1C: CPT

## 2021-12-21 PROCEDURE — 80061 LIPID PANEL: CPT

## 2021-12-21 PROCEDURE — 82746 ASSAY OF FOLIC ACID SERUM: CPT

## 2021-12-21 PROCEDURE — G1004 CDSM NDSC: HCPCS

## 2021-12-21 PROCEDURE — 85025 COMPLETE CBC W/AUTO DIFF WBC: CPT

## 2021-12-21 PROCEDURE — 86592 SYPHILIS TEST NON-TREP QUAL: CPT

## 2021-12-21 PROCEDURE — 86803 HEPATITIS C AB TEST: CPT

## 2021-12-21 PROCEDURE — 82607 VITAMIN B-12: CPT

## 2021-12-21 PROCEDURE — 76377 3D RENDER W/INTRP POSTPROCES: CPT

## 2021-12-21 PROCEDURE — 70551 MRI BRAIN STEM W/O DYE: CPT

## 2021-12-21 PROCEDURE — 82306 VITAMIN D 25 HYDROXY: CPT

## 2021-12-21 PROCEDURE — 86618 LYME DISEASE ANTIBODY: CPT

## 2021-12-21 PROCEDURE — 36415 COLL VENOUS BLD VENIPUNCTURE: CPT

## 2021-12-22 LAB
B BURGDOR IGG+IGM SER-ACNC: 19
EST. AVERAGE GLUCOSE BLD GHB EST-MCNC: 131 MG/DL
HBA1C MFR BLD: 6.2 %
HCV AB SER QL: NORMAL
RPR SER QL: NORMAL

## 2021-12-23 ENCOUNTER — TELEPHONE (OUTPATIENT)
Dept: NEUROLOGY | Facility: CLINIC | Age: 71
End: 2021-12-23

## 2021-12-27 DIAGNOSIS — R41.3 MEMORY DIFFICULTY: Primary | ICD-10-CM

## 2021-12-27 RX ORDER — DONEPEZIL HYDROCHLORIDE 5 MG/1
5 TABLET, FILM COATED ORAL
Qty: 30 TABLET | Refills: 0 | Status: SHIPPED | OUTPATIENT
Start: 2021-12-27 | End: 2022-02-09

## 2021-12-27 RX ORDER — DONEPEZIL HYDROCHLORIDE 10 MG/1
10 TABLET, FILM COATED ORAL
Qty: 30 TABLET | Refills: 5 | Status: SHIPPED | OUTPATIENT
Start: 2021-12-27 | End: 2022-03-17 | Stop reason: SDUPTHER

## 2021-12-30 ENCOUNTER — HOSPITAL ENCOUNTER (OUTPATIENT)
Dept: MRI IMAGING | Facility: HOSPITAL | Age: 71
Discharge: HOME | End: 2021-12-30
Payer: COMMERCIAL

## 2021-12-30 VITALS — BODY MASS INDEX: 28.71 KG/M2 | HEIGHT: 62 IN | WEIGHT: 156 LBS

## 2021-12-30 DIAGNOSIS — R92.8 ABNORMAL MAMMOGRAM: ICD-10-CM

## 2021-12-30 PROCEDURE — A9585 GADOBUTROL INJECTION: HCPCS | Performed by: RADIOLOGY

## 2021-12-30 PROCEDURE — C8908 MRI W/O FOL W/CONT, BREAST,: HCPCS

## 2021-12-30 PROCEDURE — C8937 CAD BREAST MRI: HCPCS

## 2021-12-30 RX ADMIN — GADOBUTROL 6 ML: 604.72 INJECTION INTRAVENOUS at 09:15

## 2022-01-04 ENCOUNTER — TELEPHONE (OUTPATIENT)
Dept: MAMMOGRAPHY | Facility: CLINIC | Age: 72
End: 2022-01-04

## 2022-01-06 ENCOUNTER — TELEPHONE (OUTPATIENT)
Dept: MAMMOGRAPHY | Facility: CLINIC | Age: 72
End: 2022-01-06

## 2022-01-07 NOTE — PROGRESS NOTES
Call placed to patient (spoke with son) regarding recommendation for;    ___X__ RIGHT ___X___LEFT      __X___Ultrasound guided  ____X__Stereotactic breast biopsy  Additional questions answered at this time    __X___Verbalized understanding        Blood thinners: No: ___X__ Yes: _____ What:     Biopsy teaching sheet given:  _____yes __X__no (All teaching points discussed during call, pt with no questions at this time, pt adv to arrive at 200 for us then biopsy to follow)    Pt son given name/# for any further questions/needs    Pt son agreeable to a post procedure call and states we can give him biopsy results to her over the phone

## 2022-01-26 ENCOUNTER — HOSPITAL ENCOUNTER (OUTPATIENT)
Dept: ULTRASOUND IMAGING | Facility: CLINIC | Age: 72
Discharge: HOME | End: 2022-01-26
Payer: COMMERCIAL

## 2022-01-26 ENCOUNTER — HOSPITAL ENCOUNTER (OUTPATIENT)
Dept: MAMMOGRAPHY | Facility: CLINIC | Age: 72
Discharge: HOME | End: 2022-01-26
Payer: COMMERCIAL

## 2022-01-26 ENCOUNTER — TELEPHONE (OUTPATIENT)
Dept: NEUROLOGY | Facility: CLINIC | Age: 72
End: 2022-01-26

## 2022-01-26 VITALS
BODY MASS INDEX: 28.71 KG/M2 | DIASTOLIC BLOOD PRESSURE: 64 MMHG | WEIGHT: 156 LBS | SYSTOLIC BLOOD PRESSURE: 150 MMHG | HEIGHT: 62 IN

## 2022-01-26 VITALS — SYSTOLIC BLOOD PRESSURE: 174 MMHG | HEART RATE: 64 BPM | DIASTOLIC BLOOD PRESSURE: 80 MMHG

## 2022-01-26 DIAGNOSIS — R92.8 ABNORMAL MAMMOGRAM: ICD-10-CM

## 2022-01-26 DIAGNOSIS — R92.8 ABNORMAL MRI, BREAST: ICD-10-CM

## 2022-01-26 DIAGNOSIS — R92.8 ABNORMAL FINDINGS ON DIAGNOSTIC IMAGING OF BREAST: ICD-10-CM

## 2022-01-26 PROCEDURE — 88342 IMHCHEM/IMCYTCHM 1ST ANTB: CPT | Performed by: PATHOLOGY

## 2022-01-26 PROCEDURE — 88305 TISSUE EXAM BY PATHOLOGIST: CPT | Performed by: SPECIALIST

## 2022-01-26 PROCEDURE — A4648 IMPLANTABLE TISSUE MARKER: HCPCS

## 2022-01-26 PROCEDURE — 76642 ULTRASOUND BREAST LIMITED: CPT

## 2022-01-26 PROCEDURE — 88341 IMHCHEM/IMCYTCHM EA ADD ANTB: CPT | Performed by: SPECIALIST

## 2022-01-26 PROCEDURE — 19083 BX BREAST 1ST LESION US IMAG: CPT

## 2022-01-26 PROCEDURE — 88305 TISSUE EXAM BY PATHOLOGIST: CPT | Performed by: PATHOLOGY

## 2022-01-26 PROCEDURE — 88342 IMHCHEM/IMCYTCHM 1ST ANTB: CPT | Performed by: SPECIALIST

## 2022-01-26 PROCEDURE — 19081 BX BREAST 1ST LESION STRTCTC: CPT

## 2022-01-26 RX ORDER — LIDOCAINE HYDROCHLORIDE AND EPINEPHRINE BITARTRATE 20; .01 MG/ML; MG/ML
10 INJECTION, SOLUTION SUBCUTANEOUS ONCE
Status: COMPLETED | OUTPATIENT
Start: 2022-01-26 | End: 2022-01-26

## 2022-01-26 RX ORDER — LIDOCAINE HYDROCHLORIDE 10 MG/ML
5 INJECTION, SOLUTION EPIDURAL; INFILTRATION; INTRACAUDAL; PERINEURAL ONCE
Status: COMPLETED | OUTPATIENT
Start: 2022-01-26 | End: 2022-01-26

## 2022-01-26 RX ADMIN — LIDOCAINE HYDROCHLORIDE AND EPINEPHRINE 10 ML: 20; 10 INJECTION, SOLUTION INFILTRATION; PERINEURAL at 14:16

## 2022-01-26 RX ADMIN — LIDOCAINE HYDROCHLORIDE 5 ML: 10 INJECTION, SOLUTION EPIDURAL; INFILTRATION; INTRACAUDAL; PERINEURAL at 14:16

## 2022-01-26 RX ADMIN — LIDOCAINE HYDROCHLORIDE 5 ML: 10 INJECTION, SOLUTION EPIDURAL; INFILTRATION; INTRACAUDAL; PERINEURAL at 12:45

## 2022-01-26 NOTE — PROGRESS NOTES
Procedure type:    __x___ultrasound guided _____stereotactic    Breast:    _____Left ___x__Right    Location: Left Breast 400 2cmfn    Needle:Marquee 16 G    # of passes: 4    Clip: Cylinder    Performed by: Dr Susanne Macias held for 5 minutes by: Christiana Coreas RN    Steri Strips:    __x___yes _____no    Band aid:    ___x__yes_____no    Tape and guaze:    _____yes __x___no    Tolerated procedure:    __x___yes _____no

## 2022-01-26 NOTE — PROGRESS NOTES
Ice pack given:    ___x__yes _____no    Discharge instructions signed by patient:    __x___yes _____no    Discharge instructions given to patient:    _x____yes _____no    Discharged via:    __x___ambulatory    _____wheelchair    _____stretcher    Stable on discharge:    ___x__yes ____no

## 2022-01-26 NOTE — TELEPHONE ENCOUNTER
Pt's son Michael's phone number is [phone]  Left message for jack to call office back    Per chart pt was ordered 5mg for 1 month then pt is to increase to 10mg    aricept is available using a good rx coupon for less than $10

## 2022-01-26 NOTE — TELEPHONE ENCOUNTER
January 26, 2022             7:13 AM  Marvin Morocho routed this conversation to MD Joelle Orlando MD  to [facility] Clinical Team 6    RK      1:49 PM  I did tried to call the patient at the number listed below that was given by you it is a non working number, Aricept should be generic, other option would be is [employer] please let the patient's family know       Thank you

## 2022-01-26 NOTE — PROGRESS NOTES
Procedure type:    _____ultrasound guided ___x__stereotactic    Breast:    _____Left __x___Right    Location: Right Breast 100    Needle: Mammotome 8G    # of passes: 6    Clip: Triple Twist    Performed by: Dr Elizabeth Sánchez held for 5 minutes by: Crispin Dewitt RN    Steri Strips:    _x____yes _____no    Band aid:    __x___yes_____no    Tape and guaze:    _____yes __x___no    Tolerated procedure:    ___x__yes _____no

## 2022-01-26 NOTE — TELEPHONE ENCOUNTER
January 25, 2022    Brady Medici  to Chasidy Turk MD    DT      10:22 AM  Why hasnt the prescription been refilled for Kathi Wade?  Please call me       DONEPEZIL HCL 5 MG TABLET  Awaiting Prescriber Response  Awaiting Prescriber Response  Last filled on 12/27/2021     Balbina Side  Son and [address] [place] [address]

## 2022-01-26 NOTE — TELEPHONE ENCOUNTER
May Mcneal MD  to [facility] [place] Clinical Team 6    RK      12:12 PM  Please check with them the Aricept dosage the prescription of 5 mg has not been filled as patient is supposed to be on 10 mg

## 2022-01-26 NOTE — TELEPHONE ENCOUNTER
Vivek Ramirez  to Gabriel Alberts MD          3:57 PM  This message is being sent by Tavares Springer on behalf of Chris Xie      is there an alternative medication she can take thats cheaper? I had to have a long conversation with my mother today because she doesnt understand shes in capable of making decisions independently on her own any longer  she changed her insurance plan for January 1 and now that prescription is over $300 because she doesnt have a prescription plan through [address] [place] [address]   any longer  This is become a major issue for her excepting that shes no longer independently living  This goes on and on for the last year shes lived here but I had to explain to her thats why we had to move her here because her well-being was in jeopardy   I dont understand where to go from here

## 2022-01-27 NOTE — TELEPHONE ENCOUNTER
Pt's son Jaun Cat LVM to call  Spoke with son and he states that his mother has been changing her prescription coverage almost every month and states he was unaware of this  Pt's son states he intends to call [employer] to clear things up  He states pt unable to care for herself, was given power of   Pt's son to call back with update      Dr Lai Strickland

## 2022-01-27 NOTE — PROGRESS NOTES
Post procedure call completed with jonathan/POA 1/27/22 at 1048    Bleeding: _____yes __X___no    Pain: _____yes ___X___no    Redness/Swelling: ______yes ___X___no    Band aid removed: __X___yes _____no    Steri-Strips intact: ___X___yes _____no (discussed with patient to remove steri strips on day 5 if they have not come off on their own)    Pt with no questions at this time, adv will call when results available, adv to call with any questions or concerns, has name/# for contact

## 2022-01-28 ENCOUNTER — TELEPHONE (OUTPATIENT)
Dept: HEMATOLOGY ONCOLOGY | Facility: CLINIC | Age: 72
End: 2022-01-28

## 2022-01-28 ENCOUNTER — TELEPHONE (OUTPATIENT)
Dept: MAMMOGRAPHY | Facility: CLINIC | Age: 72
End: 2022-01-28

## 2022-01-28 NOTE — TELEPHONE ENCOUNTER
New Patient Intake Form   Patient Details:    Taqueria Duval  1950  [id number]    Appointment Information   Who is calling to schedule? [facility]   If not self, what is the caller's name? 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 Yes   Is patient aware of diagnosis? Yes   Have you had any imaging or labs done? If yes, where? (If imaging done outside of [facility], please remind patient to bring a disk ) Yes   Have you been seen by another Oncologist/Hematologist?  If so, who and where? No   Are the records in [facility] or Care Everywhere? Yes   Are records needed from an outside facility? No   If yes, Name of facility, city and state where facility is located  Preferred [place]   Is the patient willing to be seen by another provider?   (This is for breast patients only) Yes   Miscellaneous Information: Please have all contact go through Power of Saray Veliz (Son) [phone]

## 2022-02-03 ENCOUNTER — TELEPHONE (OUTPATIENT)
Dept: SURGICAL ONCOLOGY | Facility: CLINIC | Age: 72
End: 2022-02-03

## 2022-02-03 NOTE — TELEPHONE ENCOUNTER
Called patient and left a message for the appt on 2/9/22 with Dr Anayeli Vivar to be at [address] [place] [address] in the [address] [facility] next to the [facility], [address]

## 2022-02-08 PROBLEM — N60.91 ATYPICAL DUCTAL HYPERPLASIA OF RIGHT BREAST: Status: ACTIVE | Noted: 2022-02-08

## 2022-02-08 PROBLEM — D24.2 BREAST FIBROADENOMA IN FEMALE, LEFT: Status: ACTIVE | Noted: 2022-02-08

## 2022-02-09 ENCOUNTER — CONSULT (OUTPATIENT)
Dept: SURGICAL ONCOLOGY | Facility: CLINIC | Age: 72
End: 2022-02-09
Payer: COMMERCIAL

## 2022-02-09 ENCOUNTER — ANCILLARY ORDERS (OUTPATIENT)
Dept: SURGICAL ONCOLOGY | Facility: CLINIC | Age: 72
End: 2022-02-09

## 2022-02-09 VITALS
SYSTOLIC BLOOD PRESSURE: 160 MMHG | HEART RATE: 51 BPM | RESPIRATION RATE: 12 BRPM | OXYGEN SATURATION: 98 % | DIASTOLIC BLOOD PRESSURE: 80 MMHG | WEIGHT: 156 LBS | HEIGHT: 62 IN | BODY MASS INDEX: 28.71 KG/M2 | TEMPERATURE: 97.2 F

## 2022-02-09 DIAGNOSIS — D24.2 BREAST FIBROADENOMA IN FEMALE, LEFT: Primary | ICD-10-CM

## 2022-02-09 DIAGNOSIS — N60.91 ATYPICAL DUCTAL HYPERPLASIA OF RIGHT BREAST: ICD-10-CM

## 2022-02-09 DIAGNOSIS — Z01.818 PRE-OP EXAMINATION: ICD-10-CM

## 2022-02-09 DIAGNOSIS — R92.8 ABNORMAL MAMMOGRAM: ICD-10-CM

## 2022-02-09 PROCEDURE — 99204 OFFICE O/P NEW MOD 45 MIN: CPT | Performed by: SURGERY

## 2022-02-09 RX ORDER — CEFAZOLIN SODIUM 1 G/50ML
1000 SOLUTION INTRAVENOUS ONCE
Status: CANCELLED | OUTPATIENT
Start: 2022-02-09 | End: 2022-02-09

## 2022-02-09 RX ORDER — HYDROCODONE BITARTRATE AND ACETAMINOPHEN 5; 325 MG/1; MG/1
1 TABLET ORAL EVERY 6 HOURS PRN
Qty: 10 TABLET | Refills: 0 | Status: SHIPPED | OUTPATIENT
Start: 2022-02-09

## 2022-02-09 NOTE — PROGRESS NOTES
Surgical Oncology Consult Note       [facility]  [facility]  [address]  [facility] [place] [address]    Jessica Ansari  1950  [id number]      Chief Complaint   Patient presents with    Consult     fibroadenoma         Assessment/Plan    1  Breast fibroadenoma in female, left    2  Atypical ductal hyperplasia of right breast    3  Pre-op examination         Oncology History    No history exists  This 68-year-old female who felt a mass in her right breast at 1:00 a m     This was followed by mammogram and ultrasound  Followed by biopsy of the right breast palpated mass under 1:00 a m  as well as left breast 4:00 a m     Right breast mass is consistent with focal atypical ductal hyperplasia  She has been referred to surgery for excision with regard to focal ductal atypical hyperplasia  Left breast biopsy is consistent with fibroadenoma  She is here with her son to discuss further workup and management  Review of Systems   Constitutional: Negative for chills and fever  HENT: Negative for ear pain and sore throat  Eyes: Negative for pain and visual disturbance  Respiratory: Negative for cough and shortness of breath  Cardiovascular: Negative for chest pain and palpitations  Gastrointestinal: Negative for abdominal pain and vomiting  Genitourinary: Negative for dysuria and hematuria  Musculoskeletal: Negative for arthralgias and back pain  Skin: Negative for color change and rash  Neurological: Negative for seizures and syncope  All other systems reviewed and are negative         Past Medical History:      Patient Active Problem List   Diagnosis    Tremor    Memory difficulty    Breast fibroadenoma in female, left    Atypical ductal hyperplasia of right breast        Past Medical History:   Diagnosis Date    [facility] cancer [facility] [address] ) 2015    chemo and surgery    Diabetes mellitus ([facility] [address] )     Hypertension     [facility] cancer [facility]melanoma) [facility] [address]         Past Surgical History:   Procedure Laterality Date    APPENDECTOMY      BACK SURGERY      BREAST BIOPSY Right 01/26/2022    right    CHOLECYSTECTOMY      HYSTERECTOMY  2000    MAMMO STEREOTACTIC BREAST BIOPSY RIGHT (ALL INC) Right 1/26/2022    US GUIDED BREAST BIOPSY LEFT COMPLETE Left 1/26/2022        Family History   Problem Relation Age of Onset    No Known Problems Mother     No Known Problems Father     No Known Problems Sister     Lung cancer Brother     No Known Problems Maternal Grandmother     No Known Problems Maternal Grandfather     No Known Problems Paternal Grandmother     No Known Problems Paternal Grandfather     No Known Problems Daughter     No Known Problems Maternal Aunt     No Known Problems Paternal Aunt     Other Paternal Aunt         oral cancer    Breast cancer Paternal Aunt         age unknown   Heriberto Daubs Breast cancer Paternal [de-identified]         age unknown   Heriberto Daubs Breast cancer Cousin         age unknown    Stomach cancer Paternal Uncle     No Known Problems Son     No Known Problems Son         Social History     Socioeconomic History    Marital status:      Spouse name: Not on file    Number of children: Not on file    Years of education: Not on file    Highest education level: Not on file   Occupational History    Not on file   Tobacco Use    Smoking status: Current Every Day Smoker    Smokeless tobacco: Never Used   Vaping Use    Vaping Use: Never used   Substance and Sexual Activity    Alcohol use: Never    Drug use: Never    Sexual activity: Yes   Other Topics Concern    Not on file   Social History Narrative    Not on file     Social Determinants of Health     Financial Resource Strain: Not on file   Food Insecurity: Not on file   Transportation Needs: Not on file   Physical Activity: Not on file   Stress: Not on file   Social Connections: Not on file   Intimate Partner Violence: Not on file   Housing Stability: Not on file        Current Outpatient Medications:     albuterol (PROVENTIL HFA,VENTOLIN HFA) 90 mcg/act inhaler, INHALE 2 PUFFS BY MOUTH EVERY 4-6 HOURS AS NEEDED FOR SHORTNESS OF BREATH OR WHEEZING, Disp: , Rfl:     donepezil (ARICEPT) 10 mg tablet, Take 1 tablet (10 mg total) by mouth daily at bedtime, Disp: 30 tablet, Rfl: 5    gabapentin (NEURONTIN) 600 MG tablet, Take 300 mg by mouth 2 (two) times a day , Disp: , Rfl:     glimepiride (AMARYL) 4 mg tablet, Take 4 mg by mouth daily with breakfast, Disp: , Rfl:     hydrochlorothiazide (HYDRODIURIL) 12 5 mg tablet, Take 12 5 mg by mouth daily, Disp: , Rfl:     irbesartan (AVAPRO) 300 mg tablet, Take 300 mg by mouth daily, Disp: , Rfl:     magnesium oxide (MAG-OX) 400 mg tablet, Take 400 mg by mouth daily, Disp: , Rfl:     metFORMIN (GLUCOPHAGE) 1000 MG tablet, Take 1,000 mg by mouth daily with breakfast , Disp: , Rfl:     metoprolol succinate (TOPROL-XL) 50 mg 24 hr tablet, Take 75 mg by mouth daily, Disp: , Rfl:     omeprazole (PriLOSEC) 40 MG capsule, Take 40 mg by mouth daily, Disp: , Rfl:     oxybutynin (DITROPAN-XL) 5 mg 24 hr tablet, Take 5 mg by mouth daily, Disp: , Rfl:     sitaGLIPtin (JANUVIA) 100 mg tablet, Take 100 mg by mouth daily, Disp: , Rfl:     amitriptyline (ELAVIL) 50 mg tablet, Take 50 mg by mouth daily at bedtime (Patient not taking: Reported on 11/24/2021 ), Disp: , Rfl:     amLODIPine (NORVASC) 2 5 mg tablet, Take 2 5 mg by mouth daily (Patient not taking: Reported on 11/24/2021 ), Disp: , Rfl:      Allergies   Allergen Reactions    Codeine GI Intolerance    Morphine GI Intolerance       Physical Exam:     Vitals:    02/09/22 1249   BP: 160/80   Pulse: (!) 51   Resp: 12   Temp: (!) 97 2 °F (36 2 °C)   SpO2: 98%     Physical Exam  Constitutional:       Appearance: Normal appearance  HENT:      Head: Normocephalic and atraumatic        Nose: Nose normal       Mouth/Throat:      Mouth: Mucous membranes are moist    Eyes:      Pupils: Pupils are equal, round, and reactive to light  Cardiovascular:      Rate and Rhythm: Normal rate  Pulses: Normal pulses  Heart sounds: Normal heart sounds  Pulmonary:      Effort: Pulmonary effort is normal       Breath sounds: Normal breath sounds  Chest:          Comments: Right breast at 1:00 a m  palpable mass persist   No nipple discharge nipple retraction or skin changes other than generalized skin dryness  Right axillary and supraclavicular examination no palpable lymphadenopathy  Left breast examination no palpable mass or masses no nipple discharge no nipple retraction or skin changes no palpable supraclavicular or left axillary lymphadenopathy  Abdominal:      General: Bowel sounds are normal       Palpations: Abdomen is soft  Musculoskeletal:         General: Normal range of motion  Cervical back: Normal range of motion and neck supple  Skin:     General: Skin is warm  Neurological:      General: No focal deficit present  Mental Status: She is alert and oriented to person, place, and time  Psychiatric:         Mood and Affect: Mood normal          Behavior: Behavior normal          Thought Content: Thought content normal          Judgment: Judgment normal          Results:   A  Breast, Right, Specimen 1 @ 1:00, (stereo) 6 cores for architectural distortion-all submitted:  - Breast tissue with focal atypical ductal hyperplasia, usual ductal hyperplasia, stromal fibrosis,and sclerosing adenosis with microcalcifications  See note  - Negative for in-situ or invasive carcinoma    B:A  Left breast, 4:00, 2 cm from nipple (ultrasound-guided 16 gauge Marquee core biopsy, 4 passes):     - Portions of fibroadenoma; negative for atypia and malignancy  Discussion/Summary: This 68-year-old female with a personal history of colon cancer status post colon resection  She recently she had an abnormal mammogram plus palpable right breast mass  These were followed by biopsy  Right breast mass is consistent with a focal ductal hyperplasia  I discussed with in detail with the patient as well as her son pathophysiology of atypical ductal hyperplasia  We also discussed in the core biopsy of atypical ductal hyperplasia could be missed ductal carcinoma in Situ +/-invasive cancer anywhere on 10-20%  Based on these surgical excision is recommended  They understand this is not cancer operation  Based on final pathology she may need additional procedures based on final pathology  Surgical consent was obtained for right breast JOHN directed lumpectomy of this atypical ductal hyperplasia at 1:00 a m     We also discussed in detail about the benign nature of fibroadenoma need close follow-up even in the future surgical resections were discussed in detail  Surgical consent was obtained after explaining benefits, alternative, procedure and possible complications with regards above mentioned procedure  All her questions regarding the visit  as well as the  surgery were answered to  the patient's satisfaction  Advance Care Planning/Advance Directives: Summer Collins MD discussed the disease status, and treatment plans with Chris Xie today 02/09/22 and will follow-up with the patient

## 2022-02-09 NOTE — PROGRESS NOTES
Message rec'd from Eula Beck, Dr Froylan Ledezma's office regarding recommendation for;    __X___ RIGHT ______LEFT      __X___SAVI  placement  Procedure explained to patient by Dr Zaire Evans, additional questions answered at that time    __X___Verbalized understanding        Blood thinners:  _____yes __X___no    Date stopped: ___N/A____    All teaching points discussed during office visit, pt with no questions at that time, pt adv to arrive at 0930 for 0945 insertion    Pt given name/# for any further questions/needs

## 2022-02-09 NOTE — H&P (VIEW-ONLY)
Surgical Oncology Consult Note       [facility]  [facility] [place]  [address]  [place] [address]    Fide Son  1950  [id number]      Chief Complaint   Patient presents with    Consult     fibroadenoma         Assessment/Plan    1  Breast fibroadenoma in female, left    2  Atypical ductal hyperplasia of right breast    3  Pre-op examination         Oncology History    No history exists  This 42-year-old female who felt a mass in her right breast at 1:00 a m     This was followed by mammogram and ultrasound  Followed by biopsy of the right breast palpated mass under 1:00 a m  as well as left breast 4:00 a m     Right breast mass is consistent with focal atypical ductal hyperplasia  She has been referred to surgery for excision with regard to focal ductal atypical hyperplasia  Left breast biopsy is consistent with fibroadenoma  She is here with her son to discuss further workup and management  Review of Systems   Constitutional: Negative for chills and fever  HENT: Negative for ear pain and sore throat  Eyes: Negative for pain and visual disturbance  Respiratory: Negative for cough and shortness of breath  Cardiovascular: Negative for chest pain and palpitations  Gastrointestinal: Negative for abdominal pain and vomiting  Genitourinary: Negative for dysuria and hematuria  Musculoskeletal: Negative for arthralgias and back pain  Skin: Negative for color change and rash  Neurological: Negative for seizures and syncope  All other systems reviewed and are negative         Past Medical History:      Patient Active Problem List   Diagnosis    Tremor    Memory difficulty    Breast fibroadenoma in female, left    Atypical ductal hyperplasia of right breast        Past Medical History:   Diagnosis Date    Colon cancer ([facility] [address] ) 2015    chemo and surgery    Diabetes mellitus ([facility] [address] )     Hypertension     Skin cancer (melanoma) ([facility] [address] )         Past Surgical History:   Procedure Laterality Date    APPENDECTOMY      BACK SURGERY      BREAST BIOPSY Right 01/26/2022    right    CHOLECYSTECTOMY      HYSTERECTOMY  2000    MAMMO STEREOTACTIC BREAST BIOPSY RIGHT (ALL INC) Right 1/26/2022    US GUIDED BREAST BIOPSY LEFT COMPLETE Left 1/26/2022        Family History   Problem Relation Age of Onset    No Known Problems Mother     No Known Problems Father     No Known Problems Sister     Lung cancer Brother     No Known Problems Maternal Grandmother     No Known Problems Maternal Grandfather     No Known Problems Paternal Grandmother     No Known Problems Paternal Grandfather     No Known Problems Daughter     No Known Problems Maternal Aunt     No Known Problems Paternal Aunt     Other Paternal Aunt         oral cancer    Breast cancer Paternal Aunt         age unknown   Morrison [place] Breast cancer Paternal [de-identified]         age unknown   Jimena [place] Breast cancer Cousin         age unknown    Stomach cancer Paternal Uncle     No Known Problems Son     No Known Problems Son         Social History     Socioeconomic History    Marital status:      Spouse name: Not on file    Number of children: Not on file    Years of education: Not on file    Highest education level: Not on file   Occupational History    Not on file   Tobacco Use    Smoking status: Current Every Day Smoker    Smokeless tobacco: Never Used   Vaping Use    Vaping Use: Never used   Substance and Sexual Activity    Alcohol use: Never    Drug use: Never    Sexual activity: Yes   Other Topics Concern    Not on file   Social History Narrative    Not on file     Social Determinants of Health     Financial Resource Strain: Not on file   Food Insecurity: Not on file   Transportation Needs: Not on file   Physical Activity: Not on file   Stress: Not on file   Social Connections: Not on file   Intimate Partner Violence: Not on file   Housing Stability: Not on file        Current Outpatient Medications:     albuterol (PROVENTIL HFA,VENTOLIN HFA) 90 mcg/act inhaler, INHALE 2 PUFFS BY MOUTH EVERY 4-6 HOURS AS NEEDED FOR SHORTNESS OF BREATH OR WHEEZING, Disp: , Rfl:     donepezil (ARICEPT) 10 mg tablet, Take 1 tablet (10 mg total) by mouth daily at bedtime, Disp: 30 tablet, Rfl: 5    gabapentin (NEURONTIN) 600 MG tablet, Take 300 mg by mouth 2 (two) times a day , Disp: , Rfl:     glimepiride (AMARYL) 4 mg tablet, Take 4 mg by mouth daily with breakfast, Disp: , Rfl:     hydrochlorothiazide (HYDRODIURIL) 12 5 mg tablet, Take 12 5 mg by mouth daily, Disp: , Rfl:     irbesartan (AVAPRO) 300 mg tablet, Take 300 mg by mouth daily, Disp: , Rfl:     magnesium oxide (MAG-OX) 400 mg tablet, Take 400 mg by mouth daily, Disp: , Rfl:     metFORMIN (GLUCOPHAGE) 1000 MG tablet, Take 1,000 mg by mouth daily with breakfast , Disp: , Rfl:     metoprolol succinate (TOPROL-XL) 50 mg 24 hr tablet, Take 75 mg by mouth daily, Disp: , Rfl:     omeprazole (PriLOSEC) 40 MG capsule, Take 40 mg by mouth daily, Disp: , Rfl:     oxybutynin (DITROPAN-XL) 5 mg 24 hr tablet, Take 5 mg by mouth daily, Disp: , Rfl:     sitaGLIPtin (JANUVIA) 100 mg tablet, Take 100 mg by mouth daily, Disp: , Rfl:     amitriptyline (ELAVIL) 50 mg tablet, Take 50 mg by mouth daily at bedtime (Patient not taking: Reported on 11/24/2021 ), Disp: , Rfl:     amLODIPine (NORVASC) 2 5 mg tablet, Take 2 5 mg by mouth daily (Patient not taking: Reported on 11/24/2021 ), Disp: , Rfl:      Allergies   Allergen Reactions    Codeine GI Intolerance    Morphine GI Intolerance       Physical Exam:     Vitals:    02/09/22 1249   BP: 160/80   Pulse: (!) 51   Resp: 12   Temp: (!) 97 2 °F (36 2 °C)   SpO2: 98%     Physical Exam  Constitutional:       Appearance: Normal appearance  HENT:      Head: Normocephalic and atraumatic        Nose: Nose normal       Mouth/Throat:      Mouth: Mucous membranes are moist    Eyes:      Pupils: Pupils are equal, round, and reactive to light  Cardiovascular:      Rate and Rhythm: Normal rate  Pulses: Normal pulses  Heart sounds: Normal heart sounds  Pulmonary:      Effort: Pulmonary effort is normal       Breath sounds: Normal breath sounds  Chest:          Comments: Right breast at 1:00 a m  palpable mass persist   No nipple discharge nipple retraction or skin changes other than generalized skin dryness  Right axillary and supraclavicular examination no palpable lymphadenopathy  Left breast examination no palpable mass or masses no nipple discharge no nipple retraction or skin changes no palpable supraclavicular or left axillary lymphadenopathy  Abdominal:      General: Bowel sounds are normal       Palpations: Abdomen is soft  Musculoskeletal:         General: Normal range of motion  Cervical back: Normal range of motion and neck supple  Skin:     General: Skin is warm  Neurological:      General: No focal deficit present  Mental Status: She is alert and oriented to person, place, and time  Psychiatric:         Mood and Affect: Mood normal          Behavior: Behavior normal          Thought Content: Thought content normal          Judgment: Judgment normal          Results:   A  Breast, Right, Specimen 1 @ 1:00, (stereo) 6 cores for architectural distortion-all submitted:  - Breast tissue with focal atypical ductal hyperplasia, usual ductal hyperplasia, stromal fibrosis,and sclerosing adenosis with microcalcifications  See note  - Negative for in-situ or invasive carcinoma    B:A  Left breast, 4:00, 2 cm from nipple (ultrasound-guided 16 gauge Marquee core biopsy, 4 passes):     - Portions of fibroadenoma; negative for atypia and malignancy  Discussion/Summary: This 68-year-old female with a personal history of colon cancer status post colon resection  She recently she had an abnormal mammogram plus palpable right breast mass  These were followed by biopsy  Right breast mass is consistent with a focal ductal hyperplasia  I discussed with in detail with the patient as well as her son pathophysiology of atypical ductal hyperplasia  We also discussed in the core biopsy of atypical ductal hyperplasia could be missed ductal carcinoma in Situ +/-invasive cancer anywhere on 10-20%  Based on these surgical excision is recommended  They understand this is not cancer operation  Based on final pathology she may need additional procedures based on final pathology  Surgical consent was obtained for right breast JOHN directed lumpectomy of this atypical ductal hyperplasia at 1:00 a m     We also discussed in detail about the benign nature of fibroadenoma need close follow-up even in the future surgical resections were discussed in detail  Surgical consent was obtained after explaining benefits, alternative, procedure and possible complications with regards above mentioned procedure  All her questions regarding the visit  as well as the  surgery were answered to  the patient's satisfaction  Advance Care Planning/Advance Directives: Jorge Pulido MD discussed the disease status, and treatment plans with Ana Figueroa today 02/09/22 and will follow-up with the patient

## 2022-02-23 RX ORDER — ATORVASTATIN CALCIUM 20 MG/1
20 TABLET, FILM COATED ORAL DAILY
COMMUNITY
Start: 2022-02-06

## 2022-02-23 RX ORDER — PHENOL 1.4 %
20 AEROSOL, SPRAY (ML) MUCOUS MEMBRANE
COMMUNITY

## 2022-02-23 NOTE — PRE-PROCEDURE INSTRUCTIONS
Pre-Surgery Instructions:   Medication Instructions    albuterol (PROVENTIL HFA,VENTOLIN HFA) 90 mcg/act inhaler Continue to take these inhaler medications on your normal schedule up to and including the day of surgery   atorvastatin (LIPITOR) 20 mg tablet Continue to take this medication on your normal schedule  May take DOS   donepezil (ARICEPT) 10 mg tablet Continue to take this medication on your normal schedule  May take DOS   gabapentin (NEURONTIN) 600 MG tablet Continue to take this medication on your normal schedule  May take DOS   glimepiride (AMARYL) 4 mg tablet Continue this medication up to the evening before surgery/procedure, but do not take the morning of the day of surgery   hydrochlorothiazide (HYDRODIURIL) 12 5 mg tablet Continue this medication up to the evening before surgery/procedure, but do not take the morning of the day of surgery   irbesartan (AVAPRO) 300 mg tablet Continue this medication up to the evening before surgery/procedure, but do not take the morning of the day of surgery   magnesium oxide (MAG-OX) 400 mg tablet Stop taking 0ne week prior to DOS or starting today,2/23/22   Melatonin 10 MG TABS Stop taking 0ne week prior to DOS or starting today,2/23/22   metFORMIN (GLUCOPHAGE) 1000 MG tablet Continue this medication up to the evening before surgery/procedure, but do not take the morning of the day of surgery   metoprolol succinate (TOPROL-XL) 50 mg 24 hr tablet Continue to take this medication on your normal schedule  May take DOS   omeprazole (PriLOSEC) 40 MG capsule Continue to take this medication on your normal schedule  May take DOS   oxybutynin (DITROPAN-XL) 5 mg 24 hr tablet Continue this medication up to the evening before surgery/procedure, but do not take the morning of the day of surgery      sitaGLIPtin (JANUVIA) 100 mg tablet Continue this medication up to the evening before surgery/procedure, but do not take the morning of the day of surgery  Covid screening negative as per patient  Reviewed with patient via phone all medication instructions  Advised not to take any NSAID's, Vitamins or Herbal products prior to the DOS  Acetaminophen products are ok to take  Reviewed showering instructions as given by surgical office  Instructed to call office with any questions or concerns  Instructed about NPO after midnight the night before DOS, except sips of water with allowed medications in AM on DOS  Smoking Cessation Education Referral offered but Pt  States she is not interested at this time  Pt  Instructed not to smoke for 48 hrs prior to surgery  Informed about call from Stefan Garcia with the time to arrive for the scheduled surgery  Patient verbalized understanding

## 2022-02-24 ENCOUNTER — OFFICE VISIT (OUTPATIENT)
Dept: LAB | Facility: HOSPITAL | Age: 72
End: 2022-02-24
Payer: COMMERCIAL

## 2022-02-24 ENCOUNTER — HOSPITAL ENCOUNTER (OUTPATIENT)
Dept: RADIOLOGY | Facility: HOSPITAL | Age: 72
Discharge: HOME | End: 2022-02-24
Payer: COMMERCIAL

## 2022-02-24 ENCOUNTER — HOSPITAL ENCOUNTER (OUTPATIENT)
Dept: MAMMOGRAPHY | Facility: CLINIC | Age: 72
Discharge: HOME | End: 2022-02-24

## 2022-02-24 ENCOUNTER — HOSPITAL ENCOUNTER (OUTPATIENT)
Dept: MAMMOGRAPHY | Facility: CLINIC | Age: 72
Discharge: HOME | End: 2022-02-24
Payer: COMMERCIAL

## 2022-02-24 VITALS — HEART RATE: 46 BPM | DIASTOLIC BLOOD PRESSURE: 56 MMHG | SYSTOLIC BLOOD PRESSURE: 137 MMHG

## 2022-02-24 DIAGNOSIS — R92.8 ABNORMAL MAMMOGRAM: ICD-10-CM

## 2022-02-24 DIAGNOSIS — N60.91 ATYPICAL DUCTAL HYPERPLASIA OF RIGHT BREAST: ICD-10-CM

## 2022-02-24 LAB
ATRIAL RATE: 49 BPM
P AXIS: 64 DEGREES
PR INTERVAL: 188 MS
QRS AXIS: 13 DEGREES
QRSD INTERVAL: 108 MS
QT INTERVAL: 458 MS
QTC INTERVAL: 413 MS
T WAVE AXIS: 57 DEGREES
VENTRICULAR RATE: 49 BPM

## 2022-02-24 PROCEDURE — 19281 PERQ DEVICE BREAST 1ST IMAG: CPT

## 2022-02-24 PROCEDURE — 93010 ELECTROCARDIOGRAM REPORT: CPT | Performed by: INTERNAL MEDICINE

## 2022-02-24 PROCEDURE — 71046 X-RAY EXAM CHEST 2 VIEWS: CPT

## 2022-02-24 PROCEDURE — A4648 IMPLANTABLE TISSUE MARKER: HCPCS

## 2022-02-24 PROCEDURE — 93005 ELECTROCARDIOGRAM TRACING: CPT

## 2022-02-24 RX ORDER — LIDOCAINE HYDROCHLORIDE 10 MG/ML
5 INJECTION, SOLUTION EPIDURAL; INFILTRATION; INTRACAUDAL; PERINEURAL ONCE
Status: COMPLETED | OUTPATIENT
Start: 2022-02-24 | End: 2022-02-24

## 2022-02-24 RX ADMIN — LIDOCAINE HYDROCHLORIDE 5 ML: 10 INJECTION, SOLUTION EPIDURAL; INFILTRATION; INTRACAUDAL; PERINEURAL at 10:12

## 2022-02-24 NOTE — PROGRESS NOTES
Ice pack given:    __x___yes _____no    Discharge instructions signed by patient:    ___x__yes _____no    Discharge instructions given to patient:    __x___yes _____no    Discharged via:    __x___ambulatory    _____wheelchair    _____stretcher    Stable on discharge:    ___x__yes ____no  Patient is scheduled for surgery on 3/1/22  Ok to leave a message

## 2022-02-24 NOTE — PROGRESS NOTES
Procedure type:    _____ultrasound guided ___x__stereotactic    Breast:    _____Left ___x__Right    Location: 1 o'clock     Needle: 16 gauge    # of passes: N/A    Clip: Bessie     Performed by: Dr Iva Wilhelm held for 5 minutes by: Tara Garcia     Steri Strips:    _____yes __x___no    Band aid:    ___x__yes_____no    Tape and guaze:    _____yes ___x__no    Tolerated procedure:    ___x__yes _____no

## 2022-02-24 NOTE — DISCHARGE INSTR - OTHER ORDERS
POST LARGE CORE BREAST BIOPSY PATIENT INFORMATION      Place an ice pack inside your bra over the top of the dressing every hour for 20 minutes (20 minutes on, 60 minutes off)  Do this until bedtime  Do not shower or bathe until the following morning  You may bathe your breast carefully with the steri-strips in place  Be careful    Not to loosen them  The steri-strips will fall off in 3-5 days  You may have mild discomfort, and you may have some bruising where the   Needle entered the skin  This should clear within 5-7 days  If you need medicine for discomfort, take acetaminophen products such as   Tylenol  You may also take Advil or Motrin products  Do not participate in strenuous activities such as-tennis, aerobics, skiing,  Weight lifting, etc  for 24 hours  Refrain from swimming/soaking for 72 hours  Wearing a bra for sleeping may be more comfortable for the first 24-48 hours  Watch for continued bleeding, pain or fever over 101; please call with any questions or concerns  For procedures done at the [facility]  [address] call:  Shahana Irizarry RN at [phone]  Shira Horn RN at [phone]                    *After 4 PM call the Interventional Radiology Department                    [phone] and ask to speak with the nurse on call  For procedures done at the [address] [place] [address] call:         Jc Benites RN at   *After 4 PM call the Interventional Radiology Department   [phone] and ask to speak with the nurse on call  For procedures done at [address] [place] [address] call: The Radiology Nurse at [phone]  *After 4 PM call your physician, or go to the Emergency Department  9          The final results of your biopsy are usually available within one week

## 2022-03-01 ENCOUNTER — ANESTHESIA EVENT (OUTPATIENT)
Dept: PERIOP | Facility: HOSPITAL | Age: 72
End: 2022-03-01
Payer: COMMERCIAL

## 2022-03-01 ENCOUNTER — APPOINTMENT (OUTPATIENT)
Dept: MAMMOGRAPHY | Facility: CLINIC | Age: 72
End: 2022-03-01
Payer: COMMERCIAL

## 2022-03-01 ENCOUNTER — ANESTHESIA (OUTPATIENT)
Dept: PERIOP | Facility: HOSPITAL | Age: 72
End: 2022-03-01
Payer: COMMERCIAL

## 2022-03-01 ENCOUNTER — HOSPITAL ENCOUNTER (OUTPATIENT)
Facility: HOSPITAL | Age: 72
Setting detail: OUTPATIENT SURGERY
Discharge: HOME | End: 2022-03-01
Attending: SURGERY | Admitting: SURGERY
Payer: COMMERCIAL

## 2022-03-01 VITALS
OXYGEN SATURATION: 94 % | BODY MASS INDEX: 28.64 KG/M2 | DIASTOLIC BLOOD PRESSURE: 74 MMHG | TEMPERATURE: 97.6 F | HEIGHT: 62 IN | HEART RATE: 82 BPM | SYSTOLIC BLOOD PRESSURE: 175 MMHG | WEIGHT: 155.65 LBS | RESPIRATION RATE: 17 BRPM

## 2022-03-01 DIAGNOSIS — N63.0 BREAST LUMP OR MASS: ICD-10-CM

## 2022-03-01 DIAGNOSIS — N60.91 ATYPICAL DUCTAL HYPERPLASIA OF RIGHT BREAST: ICD-10-CM

## 2022-03-01 LAB
GLUCOSE SERPL-MCNC: 149 MG/DL (ref 65–140)
GLUCOSE SERPL-MCNC: 170 MG/DL (ref 65–140)

## 2022-03-01 PROCEDURE — 88342 IMHCHEM/IMCYTCHM 1ST ANTB: CPT | Performed by: PATHOLOGY

## 2022-03-01 PROCEDURE — 19301 PARTIAL MASTECTOMY: CPT | Performed by: SURGERY

## 2022-03-01 PROCEDURE — 88307 TISSUE EXAM BY PATHOLOGIST: CPT | Performed by: PATHOLOGY

## 2022-03-01 PROCEDURE — 82948 REAGENT STRIP/BLOOD GLUCOSE: CPT

## 2022-03-01 PROCEDURE — 88341 IMHCHEM/IMCYTCHM EA ADD ANTB: CPT | Performed by: PATHOLOGY

## 2022-03-01 PROCEDURE — 76098 X-RAY EXAM SURGICAL SPECIMEN: CPT | Performed by: SURGERY

## 2022-03-01 PROCEDURE — 19301 PARTIAL MASTECTOMY: CPT | Performed by: PHYSICIAN ASSISTANT

## 2022-03-01 PROCEDURE — 88361 TUMOR IMMUNOHISTOCHEM/COMPUT: CPT | Performed by: PATHOLOGY

## 2022-03-01 RX ORDER — FENTANYL CITRATE 50 UG/ML
INJECTION, SOLUTION INTRAMUSCULAR; INTRAVENOUS AS NEEDED
Status: DISCONTINUED | OUTPATIENT
Start: 2022-03-01 | End: 2022-03-01

## 2022-03-01 RX ORDER — CEFAZOLIN SODIUM 1 G/50ML
1000 SOLUTION INTRAVENOUS ONCE
Status: COMPLETED | OUTPATIENT
Start: 2022-03-01 | End: 2022-03-01

## 2022-03-01 RX ORDER — PROPOFOL 10 MG/ML
INJECTION, EMULSION INTRAVENOUS AS NEEDED
Status: DISCONTINUED | OUTPATIENT
Start: 2022-03-01 | End: 2022-03-01

## 2022-03-01 RX ORDER — METOCLOPRAMIDE HYDROCHLORIDE 5 MG/ML
10 INJECTION INTRAMUSCULAR; INTRAVENOUS ONCE AS NEEDED
Status: DISCONTINUED | OUTPATIENT
Start: 2022-03-01 | End: 2022-03-01 | Stop reason: HOSPADM

## 2022-03-01 RX ORDER — HYDROMORPHONE HCL/PF 1 MG/ML
0.5 SYRINGE (ML) INJECTION
Status: DISCONTINUED | OUTPATIENT
Start: 2022-03-01 | End: 2022-03-01 | Stop reason: HOSPADM

## 2022-03-01 RX ORDER — LIDOCAINE HYDROCHLORIDE 10 MG/ML
INJECTION, SOLUTION EPIDURAL; INFILTRATION; INTRACAUDAL; PERINEURAL AS NEEDED
Status: DISCONTINUED | OUTPATIENT
Start: 2022-03-01 | End: 2022-03-01

## 2022-03-01 RX ORDER — SODIUM CHLORIDE, SODIUM LACTATE, POTASSIUM CHLORIDE, CALCIUM CHLORIDE 600; 310; 30; 20 MG/100ML; MG/100ML; MG/100ML; MG/100ML
INJECTION, SOLUTION INTRAVENOUS CONTINUOUS PRN
Status: DISCONTINUED | OUTPATIENT
Start: 2022-03-01 | End: 2022-03-01

## 2022-03-01 RX ORDER — ONDANSETRON 2 MG/ML
INJECTION INTRAMUSCULAR; INTRAVENOUS AS NEEDED
Status: DISCONTINUED | OUTPATIENT
Start: 2022-03-01 | End: 2022-03-01

## 2022-03-01 RX ORDER — MIDAZOLAM HYDROCHLORIDE 2 MG/2ML
INJECTION, SOLUTION INTRAMUSCULAR; INTRAVENOUS AS NEEDED
Status: DISCONTINUED | OUTPATIENT
Start: 2022-03-01 | End: 2022-03-01

## 2022-03-01 RX ORDER — DIPHENHYDRAMINE HYDROCHLORIDE 50 MG/ML
12.5 INJECTION INTRAMUSCULAR; INTRAVENOUS ONCE AS NEEDED
Status: DISCONTINUED | OUTPATIENT
Start: 2022-03-01 | End: 2022-03-01 | Stop reason: HOSPADM

## 2022-03-01 RX ORDER — FENTANYL CITRATE/PF 50 MCG/ML
50 SYRINGE (ML) INJECTION
Status: DISCONTINUED | OUTPATIENT
Start: 2022-03-01 | End: 2022-03-01 | Stop reason: HOSPADM

## 2022-03-01 RX ORDER — MAGNESIUM HYDROXIDE 1200 MG/15ML
LIQUID ORAL AS NEEDED
Status: DISCONTINUED | OUTPATIENT
Start: 2022-03-01 | End: 2022-03-01 | Stop reason: HOSPADM

## 2022-03-01 RX ORDER — DEXAMETHASONE SODIUM PHOSPHATE 4 MG/ML
INJECTION, SOLUTION INTRA-ARTICULAR; INTRALESIONAL; INTRAMUSCULAR; INTRAVENOUS; SOFT TISSUE AS NEEDED
Status: DISCONTINUED | OUTPATIENT
Start: 2022-03-01 | End: 2022-03-01

## 2022-03-01 RX ADMIN — HYDROMORPHONE HYDROCHLORIDE 0.5 MG: 1 INJECTION, SOLUTION INTRAMUSCULAR; INTRAVENOUS; SUBCUTANEOUS at 14:12

## 2022-03-01 RX ADMIN — FENTANYL CITRATE 25 MCG: 50 INJECTION, SOLUTION INTRAMUSCULAR; INTRAVENOUS at 13:04

## 2022-03-01 RX ADMIN — PROPOFOL 150 MG: 10 INJECTION, EMULSION INTRAVENOUS at 12:31

## 2022-03-01 RX ADMIN — SODIUM CHLORIDE, SODIUM LACTATE, POTASSIUM CHLORIDE, AND CALCIUM CHLORIDE: .6; .31; .03; .02 INJECTION, SOLUTION INTRAVENOUS at 12:08

## 2022-03-01 RX ADMIN — DEXAMETHASONE SODIUM PHOSPHATE 4 MG: 4 INJECTION, SOLUTION INTRAMUSCULAR; INTRAVENOUS at 12:41

## 2022-03-01 RX ADMIN — LIDOCAINE HYDROCHLORIDE 50 MG: 10 INJECTION, SOLUTION EPIDURAL; INFILTRATION; INTRACAUDAL; PERINEURAL at 12:31

## 2022-03-01 RX ADMIN — ONDANSETRON 4 MG: 2 INJECTION INTRAMUSCULAR; INTRAVENOUS at 13:05

## 2022-03-01 RX ADMIN — FENTANYL CITRATE 50 MCG: 50 INJECTION, SOLUTION INTRAMUSCULAR; INTRAVENOUS at 12:41

## 2022-03-01 RX ADMIN — FENTANYL CITRATE 50 MCG: 50 INJECTION, SOLUTION INTRAMUSCULAR; INTRAVENOUS at 13:59

## 2022-03-01 RX ADMIN — FENTANYL CITRATE 25 MCG: 50 INJECTION, SOLUTION INTRAMUSCULAR; INTRAVENOUS at 12:55

## 2022-03-01 RX ADMIN — FENTANYL CITRATE 25 MCG: 50 INJECTION, SOLUTION INTRAMUSCULAR; INTRAVENOUS at 12:35

## 2022-03-01 RX ADMIN — CEFAZOLIN SODIUM 1000 MG: 1 SOLUTION INTRAVENOUS at 12:16

## 2022-03-01 RX ADMIN — HYDROMORPHONE HYDROCHLORIDE 0.5 MG: 1 INJECTION, SOLUTION INTRAMUSCULAR; INTRAVENOUS; SUBCUTANEOUS at 14:30

## 2022-03-01 RX ADMIN — FENTANYL CITRATE 25 MCG: 50 INJECTION, SOLUTION INTRAMUSCULAR; INTRAVENOUS at 12:45

## 2022-03-01 RX ADMIN — MIDAZOLAM HYDROCHLORIDE 1 MG: 1 INJECTION, SOLUTION INTRAMUSCULAR; INTRAVENOUS at 12:31

## 2022-03-01 RX ADMIN — MIDAZOLAM HYDROCHLORIDE 1 MG: 1 INJECTION, SOLUTION INTRAMUSCULAR; INTRAVENOUS at 12:23

## 2022-03-01 RX ADMIN — FENTANYL CITRATE 50 MCG: 50 INJECTION, SOLUTION INTRAMUSCULAR; INTRAVENOUS at 13:41

## 2022-03-01 RX ADMIN — FENTANYL CITRATE 50 MCG: 50 INJECTION, SOLUTION INTRAMUSCULAR; INTRAVENOUS at 13:19

## 2022-03-01 NOTE — ANESTHESIA POSTPROCEDURE EVALUATION
Post-Op Assessment Note    CV Status:  Stable  Pain Score: 0    Pain management: adequate     Mental Status:  Alert and awake   Hydration Status:  Euvolemic   PONV Controlled:  Controlled   Airway Patency:  Patent      Post Op Vitals Reviewed: Yes      Staff: Anesthesiologist, CRNA         No complications documented      BP     Temp 97 6 °F (36 4 °C) (03/01/22 1323)    Pulse 81 (03/01/22 1323)   Resp 16 (03/01/22 1323)    SpO2 96 % (03/01/22 1323)

## 2022-03-01 NOTE — OP NOTE
OPERATIVE REPORT  PATIENT NAME: Cesia Sequeira    :  1950  MRN: [id number]  Pt Location: [facility] OR ROOM 01    SURGERY DATE: 3/1/2022    Surgeon(s) and Role:     * Chepe Pulido MD - Primary     * Franco Whittaker PA-C - Assisting    Preop Diagnosis:  Atypical ductal hyperplasia of right breast [N60 91]    Post-Op Diagnosis Codes:     * Atypical ductal hyperplasia of right breast [N60 91]    Procedure(s) (LRB):  RIGHT BREAST JOHN  DIRECTED LUMPECTOMY (Right)    Specimen(s):  ID Type Source Tests Collected by Time Destination   1 : Right Breast John  Directed Lumpectomy, Suture Marks Short Superior Long Lateral  Tissue Breast, Right TISSUE EXAM Chepe Pulido MD 3/1/2022 1255        Estimated Blood Loss:   Minimal    Drains:  * No LDAs found *    Anesthesia Type:   General    Operative Indications:  Atypical ductal hyperplasia of right breast [N60 91]      Operative Findings:  Biopsy and JOHN clips are in the specimen  Complications:   None    Procedure and Technique:  I previously reviewed the post biopsy images  Lumpectomy  Cesia Sequeira was brought to the operation room and placed under general anesthesia  Attention was paid to ensure appropriate padding and correct positioning  The JOHN  detector was then used to locate the position of the JOHN deflector and the skin was marked in this area  The  right breast was prepped and draped in a sterile fashion  I initiated a time-out, identifying the patient, the correct side and the above procedure  All parties agreed with the time out  The planned incision was then injected with 0 25% Marcaine for local anesthesia  The incision was sharply incised  The JOHN dectector was used to guide the dissection  Superior, inferior, medial and lateral planes were developed around the JOHN deflector and the specimen truncated with electrocautery beyond the deflector    The specimen was then sutured for orientation Pt will be able to state and use at least two teach back points. purposes  A specimen radiograph was taken in two dimensions  specimen was sent to pathology for permanent analysis  Superficial bleeding controlled with electrocautery and the wound was extensively irrigated  The wound was closed with two layers, an inner layer of 3-0 vicryl and a subcuticular layer of 4-0 monocryl  prineo were applied  The counts were correct x 2     I was present for the entire procedure and A physician assistant was required during the procedure for retraction tissue handling,dissection and suturing    Patient Disposition:  PACU       SIGNATURE: Srikanth Alfred MD  DATE: March 1, 2022  TIME: 1:07 PM

## 2022-03-01 NOTE — ANESTHESIA PREPROCEDURE EVALUATION
Procedure:  JOHN  DIRECTED LUMPECTOMY (Right Breast)    Relevant Problems   No relevant active problems      Hypertension    Diabetes mellitus ([facility] )    Skin cancer (melanoma) (HCC)    Colon cancer ([facility] ) chemo and surgery   Hyperlipidemia        Physical Exam    Airway    Mallampati score: II  TM Distance: >3 FB  Neck ROM: full     Dental       Cardiovascular  Cardiovascular exam normal    Pulmonary  Pulmonary exam normal     Other Findings        Anesthesia Plan  ASA Score- 2     Anesthesia Type- general with ASA Monitors  Additional Monitors:   Airway Plan: LMA  Plan Factors-Exercise tolerance (METS): >4 METS  Chart reviewed  EKG reviewed  Imaging results reviewed  Existing labs reviewed  Patient summary reviewed  Patient is a current smoker  Patient instructed to abstain from smoking on day of procedure  Induction- intravenous  Postoperative Plan- Plan for postoperative opioid use  Planned trial extubation    Informed Consent- Anesthetic plan and risks discussed with patient  I personally reviewed this patient with the CRNA  Discussed and agreed on the Anesthesia Plan with the CRNA  Ruddy Solis

## 2022-03-01 NOTE — INTERVAL H&P NOTE
H&P reviewed  After examining the patient I find no changes in the patients condition since the H&P had been written      Vitals:    03/01/22 1204   BP: (!) 179/74   Pulse: 64   Resp: 18   Temp: 98 °F (36 7 °C)   SpO2: 98%

## 2022-03-01 NOTE — DISCHARGE INSTRUCTIONS
Breast Lumpectomy   WHAT YOU NEED TO KNOW:   After a lumpectomy you may have swelling or bruising in your breast or the area where lymph nodes were removed  You may have pain or trouble moving your arm or shoulder that is closest to the lumpectomy  You may need to do arm stretches or exercises to improve your symptoms and prevent long term issues  DISCHARGE INSTRUCTIONS:   Call 911 for any of the following:   · You feel lightheaded, short of breath, and have chest pain  · You cough up blood  · You have trouble breathing  Seek care immediately if:   · Blood soaks through your bandage  · Your stitches come apart  · Your bruise suddenly gets bigger  · Your leg or arm is larger than normal and painful  Contact your healthcare provider if:   · You have a fever or chills  · Your wound is red, swollen, or draining pus  · You have nausea or are vomiting  · Your skin is itchy, swollen, or you have a rash  · Your pain does not get better after you take pain medicine  · Your drain falls out or stops draining fluid  · Your drain has pus or foul-smelling fluid coming out of it  · You have questions or concerns about your condition or care  Medicines:      · Prescription pain medicine  may be given  Ask your healthcare provider how to take this medicine safely  Some prescription pain medicines contain acetaminophen  Do not take other medicines that contain acetaminophen without talking to your healthcare provider  Too much acetaminophen may cause liver damage  Prescription pain medicine may cause constipation  Ask your healthcare provider how to prevent or treat constipation  · NSAIDs , such as ibuprofen, help decrease swelling, pain, and fever  NSAIDs can cause stomach bleeding or kidney problems in certain people  If you take blood thinner medicine, always ask your healthcare provider if NSAIDs are safe for you  Always read the medicine label and follow directions      · Take your medicine as directed  Contact your healthcare provider if you think your medicine is not helping or if you have side effects  Tell him or her if you are allergic to any medicine  Keep a list of the medicines, vitamins, and herbs you take  Include the amounts, and when and why you take them  Bring the list or the pill bottles to follow-up visits  Carry your medicine list with you in case of an emergency  Care for your wound as directed: If you have a tight-fitting bandage, you can remove it in 24 to 48 hours, or as directed  Ask your healthcare provider when your incision can get wet  You may need to take a sponge bath until your drain is removed  Carefully wash around the incision with soap and water  It is okay to allow the soap and water to gently run over your incision  Gently pat dry the area and put on new, clean bandages as directed  Change your bandages when they get wet or dirty  Check your incision every day for redness, pus, or swelling  Self-care:   · Apply ice  on your breast for 15 to 20 minutes every hour or as directed  Use an ice pack, or put crushed ice in a plastic bag  Cover it with a towel  Ice helps prevent tissue damage and decreases swelling and pain  Rest  as directed  Do not lift anything heavy  Do not push or pull with your arms  Take short walks around the house  Gradually walk further as you feel better  Ask your healthcare provider when you can return to your normal activity    · Wear a supportive bra  as directed  Wait until you remove the tight-fitting bandage to wear a bra  You may be given a surgical bra or told to wear a sports bra  A supportive bra may help hold your bandages in place  It may also help with swelling and pain  Do not  wear bras with lace or underwire  They may rub against your incision and cause discomfort  Arm stretches: Your healthcare provider may show you how to do arm stretches  Arm stretches may prevent stiff arms or shoulders   You may need to wait until after your drains are removed to begin stretching  Do not do arm stretches until your healthcare provider says it is okay  Ask your healthcare provider for more information about arm stretches  Follow up with your doctor as directed:  Write down your questions so you remember to ask them during your visits  © Copyright [employer] 2022 Information is for End User's use only and may not be sold, redistributed or otherwise used for commercial purposes  All illustrations and images included in CareNotes® are the copyrighted property of [employer]  or [facility]   The above information is an  only  It is not intended as medical advice for individual conditions or treatments  Talk to your doctor, nurse or pharmacist before following any medical regimen to see if it is safe and effective for you

## 2022-03-10 PROBLEM — D05.11 DUCTAL CARCINOMA IN SITU (DCIS) OF RIGHT BREAST: Status: ACTIVE | Noted: 2022-03-10

## 2022-03-14 ENCOUNTER — OFFICE VISIT (OUTPATIENT)
Dept: SURGICAL ONCOLOGY | Facility: CLINIC | Age: 72
End: 2022-03-14
Payer: COMMERCIAL

## 2022-03-14 ENCOUNTER — APPOINTMENT (OUTPATIENT)
Dept: LAB | Facility: HOSPITAL | Age: 72
End: 2022-03-14
Payer: COMMERCIAL

## 2022-03-14 ENCOUNTER — DOCUMENTATION (OUTPATIENT)
Dept: GENETICS | Facility: CLINIC | Age: 72
End: 2022-03-14

## 2022-03-14 VITALS
BODY MASS INDEX: 28.52 KG/M2 | TEMPERATURE: 97.6 F | WEIGHT: 155 LBS | HEIGHT: 62 IN | SYSTOLIC BLOOD PRESSURE: 136 MMHG | OXYGEN SATURATION: 99 % | RESPIRATION RATE: 18 BRPM | DIASTOLIC BLOOD PRESSURE: 64 MMHG | HEART RATE: 74 BPM

## 2022-03-14 DIAGNOSIS — Z01.818 PRE-OP EXAMINATION: ICD-10-CM

## 2022-03-14 DIAGNOSIS — D05.11 DUCTAL CARCINOMA IN SITU (DCIS) OF RIGHT BREAST: Primary | ICD-10-CM

## 2022-03-14 DIAGNOSIS — Z80.0 FAMILY HISTORY OF MALIGNANT NEOPLASM OF GASTROINTESTINAL TRACT: ICD-10-CM

## 2022-03-14 DIAGNOSIS — D05.11 INTRADUCTAL CARCINOMA IN SITU OF RIGHT BREAST: ICD-10-CM

## 2022-03-14 DIAGNOSIS — Z80.3 FAMILY HISTORY OF MALIGNANT NEOPLASM OF BREAST: ICD-10-CM

## 2022-03-14 PROCEDURE — 99213 OFFICE O/P EST LOW 20 MIN: CPT | Performed by: SURGERY

## 2022-03-14 PROCEDURE — 36415 COLL VENOUS BLD VENIPUNCTURE: CPT

## 2022-03-14 NOTE — PROGRESS NOTES
Surgical Oncology Follow Up  [facility]  [facility] [address] [address]    [facility]  1950  [id number]      No chief complaint on file  Assessment & Plan:   59-year-old female status post right breast lumpectomy for atypical ductal hyperplasia however final pathology is extensive ductal carcinoma in Situ the area of expanding over 44 mm  Margins negative however anterior and lateral margins are less than 2 mm  She has strong family history of breast cancer at the other as well as other cancer cancers  She has personal history of colon cancer  Given the strong family history we will obtain genetic testing prior to surgical decision making  We will obtain genetic results and bring her back to discuss further management  She is here with her son and all parties agreed with our palpable plan    Cancer History:     Oncology History   Ductal carcinoma in situ (DCIS) of right breast   1/26/2022 Biopsy    Right breast stereotactic biopsy  1 o'clock  Breast tissue with focal atypical ductal hyperplasia, usual ductal hyperplasia, stromal fibrosis, and sclerosing adenosis with microcalcifications   Negative for in-situ or invasive carcinoma    Concordant  Biopsy clip is anterior to central aspect of architectural distortion  Lesvia [place] reflector was intentionally targeted posterior to biopsy clip, midway between the distortion and the biopsy clip        3/1/2022 Initial Diagnosis    Ductal carcinoma in situ (DCIS) of right breast     3/1/2022 Surgery    Right breast vivek  directed lumpectomy  Ductal carcinoma in situ  4 4 cm  Margins negative - distance from DCIS to anterior margin is less than 1 mm, distance from DCIS to lateral margin less than 2 mm    ER 90  OK 5           Interval History:   Follow-up after right breast lumpectomy for atypical ductal hyperplasia final pathology is extensive DCIS with necrosis  Review of Systems:   Review of Systems   Constitutional: Negative for chills and fever  HENT: Negative for ear pain and sore throat  Eyes: Negative for pain and visual disturbance  Respiratory: Negative for cough and shortness of breath  Cardiovascular: Negative for chest pain and palpitations  Gastrointestinal: Negative for abdominal pain and vomiting  Genitourinary: Negative for dysuria and hematuria  Musculoskeletal: Negative for arthralgias and back pain  Skin: Negative for color change and rash  Neurological: Negative for seizures and syncope  All other systems reviewed and are negative        Past Medical History     Patient Active Problem List   Diagnosis    Tremor    Memory difficulty    Breast fibroadenoma in female, left    Atypical ductal hyperplasia of right breast    Ductal carcinoma in situ (DCIS) of right breast     Past Medical History:   Diagnosis Date    Colon cancer ([facility] ) 2015    chemo and surgery    Diabetes mellitus ([facility] )     Ductal carcinoma in situ (DCIS) of right breast 3/10/2022    Hyperlipidemia     Hypertension     Skin cancer (melanoma) ([facility] )      Past Surgical History:   Procedure Laterality Date    APPENDECTOMY      BACK SURGERY      BREAST BIOPSY Right 01/26/2022    right    BREAST LUMPECTOMY Right 3/1/2022    Procedure: RIGHT BREAST JOHN  DIRECTED LUMPECTOMY;  Surgeon: PARESH Bettencourt MD;  Location: [facility] OR;  Service: Surgical Oncology    CHOLECYSTECTOMY      COLON SURGERY      COLONOSCOPY      HYSTERECTOMY  2000    MAMMO NEEDLE LOCALIZATION LEFT (ALL INC) Right 2/24/2022    MAMMO STEREOTACTIC BREAST BIOPSY RIGHT ([employer]) Right 1/26/2022    US GUIDED BREAST BIOPSY LEFT COMPLETE Left 1/26/2022     Family History   Problem Relation Age of Onset    No Known Problems Mother     No Known Problems Father     No Known Problems Sister     Lung cancer Brother     No Known Problems Maternal Grandmother     No Known Problems Maternal Grandfather     No Known Problems Paternal Grandmother     No Known Problems Paternal Grandfather     No Known Problems Daughter     No Known Problems Maternal Aunt     No Known Problems Paternal Aunt     Other Paternal Aunt         oral cancer    Breast cancer Paternal [de-identified]         age unknown   Abhi Prajapati Breast cancer Paternal [de-identified]         age unknown   Abhi Prajapati Breast cancer Cousin         age unknown    Stomach cancer Paternal Uncle     No Known Problems Son     No Known Problems Son      Social History     Socioeconomic History    Marital status:      Spouse name: Not on file    Number of children: Not on file    Years of education: Not on file    Highest education level: Not on file   Occupational History    Not on file   Tobacco Use    Smoking status: Current Every Day Smoker     Packs/day: 0 50     Years: 55 00     Pack years: 27 50    Smokeless tobacco: Never Used   Vaping Use    Vaping Use: Never used   Substance and Sexual Activity    Alcohol use: Never    Drug use: Never    Sexual activity: Not Currently   Other Topics Concern    Not on file   Social History Narrative    Not on file     Social Determinants of Health     Financial Resource Strain: Not on file   Food Insecurity: Not on file   Transportation Needs: Not on file   Physical Activity: Not on file   Stress: Not on file   Social Connections: Not on file   Intimate Partner Violence: Not on file   Housing Stability: Not on file       Current Outpatient Medications:     amitriptyline (ELAVIL) 50 mg tablet, Take 50 mg by mouth daily at bedtime (Patient not taking: Reported on 11/24/2021 ), Disp: , Rfl:     amLODIPine (NORVASC) 2 5 mg tablet, Take 2 5 mg by mouth daily (Patient not taking: Reported on 11/24/2021 ), Disp: , Rfl:     atorvastatin (LIPITOR) 20 mg tablet, Take 20 mg by mouth daily, Disp: , Rfl:     donepezil (ARICEPT) 10 mg tablet, Take 1 tablet (10 mg total) by mouth daily at bedtime, Disp: 30 tablet, Rfl: 5    gabapentin (NEURONTIN) 600 MG tablet, Take 300 mg by mouth 2 (two) times a day , Disp: , Rfl:     glimepiride (AMARYL) 4 mg tablet, Take 4 mg by mouth daily with breakfast, Disp: , Rfl:     hydrochlorothiazide (HYDRODIURIL) 12 5 mg tablet, Take 12 5 mg by mouth daily, Disp: , Rfl:     HYDROcodone-acetaminophen (Norco) 5-325 mg per tablet, Take 1 tablet by mouth every 6 (six) hours as needed for pain, Disp: 10 tablet, Rfl: 0    irbesartan (AVAPRO) 300 mg tablet, Take 300 mg by mouth daily, Disp: , Rfl:     magnesium oxide (MAG-OX) 400 mg tablet, Take 400 mg by mouth daily, Disp: , Rfl:     Melatonin 10 MG TABS, Take 20 mg by mouth daily at bedtime as needed, Disp: , Rfl:     metFORMIN (GLUCOPHAGE) 1000 MG tablet, Take 1,000 mg by mouth 2 (two) times a day with meals  , Disp: , Rfl:     metoprolol succinate (TOPROL-XL) 50 mg 24 hr tablet, Take 75 mg by mouth daily, Disp: , Rfl:     omeprazole (PriLOSEC) 40 MG capsule, Take 40 mg by mouth daily, Disp: , Rfl:     oxybutynin (DITROPAN-XL) 5 mg 24 hr tablet, Take 5 mg by mouth daily, Disp: , Rfl:     sitaGLIPtin (JANUVIA) 100 mg tablet, Take 100 mg by mouth daily, Disp: , Rfl:   Allergies   Allergen Reactions    Codeine GI Intolerance    Morphine GI Intolerance       Physical Exam:     Vitals:    03/14/22 1009   BP: 136/64   Pulse: 74   Resp: 18   Temp: 97 6 °F (36 4 °C)   SpO2: 99%     Physical Exam  Constitutional:       Appearance: Normal appearance  HENT:      Head: Normocephalic and atraumatic  Nose: Nose normal       Mouth/Throat:      Mouth: Mucous membranes are moist    Eyes:      Pupils: Pupils are equal, round, and reactive to light  Cardiovascular:      Rate and Rhythm: Normal rate  Pulses: Normal pulses  Heart sounds: Normal heart sounds  Pulmonary:      Effort: Pulmonary effort is normal       Breath sounds: Normal breath sounds     Abdominal:      General: Bowel sounds are normal       Palpations: Abdomen is soft  Musculoskeletal:         General: Normal range of motion  Cervical back: Normal range of motion and neck supple  Skin:     General: Skin is warm  Neurological:      General: No focal deficit present  Mental Status: She is alert and oriented to person, place, and time  Psychiatric:         Mood and Affect: Mood normal          Behavior: Behavior normal          Thought Content: Thought content normal          Judgment: Judgment normal            Results & Discussion:   I did review pathology and further plans were discussed  We will obtain genetics counseling/testing and bring her after  she understands and  agrees   All patient questions were answered  Advance Care Planning/Advance Directives: Misael Gould MD discussed the disease status with Ashwini Lopez  today 03/14/22  treatment plans and follow-up with the patient

## 2022-03-16 ENCOUNTER — PATIENT OUTREACH (OUTPATIENT)
Dept: HEMATOLOGY ONCOLOGY | Facility: CLINIC | Age: 72
End: 2022-03-16

## 2022-03-16 NOTE — PROGRESS NOTES
Breast Oncology Nurse Navigator    Received information about this patient from Dr Rosie Johns nurse  Called patient to introduce myself  Left a detailed VM message with my contact information

## 2022-03-30 ENCOUNTER — OFFICE VISIT (OUTPATIENT)
Dept: SURGICAL ONCOLOGY | Facility: CLINIC | Age: 72
End: 2022-03-30
Payer: COMMERCIAL

## 2022-03-30 ENCOUNTER — APPOINTMENT (OUTPATIENT)
Dept: LAB | Facility: HOSPITAL | Age: 72
End: 2022-03-30
Payer: COMMERCIAL

## 2022-03-30 VITALS
SYSTOLIC BLOOD PRESSURE: 116 MMHG | OXYGEN SATURATION: 97 % | HEIGHT: 62 IN | DIASTOLIC BLOOD PRESSURE: 80 MMHG | TEMPERATURE: 97.3 F | RESPIRATION RATE: 14 BRPM | WEIGHT: 155 LBS | HEART RATE: 53 BPM | BODY MASS INDEX: 28.52 KG/M2

## 2022-03-30 DIAGNOSIS — Z01.818 PRE-OP EXAM: ICD-10-CM

## 2022-03-30 DIAGNOSIS — D05.11 DUCTAL CARCINOMA IN SITU (DCIS) OF RIGHT BREAST: Primary | ICD-10-CM

## 2022-03-30 DIAGNOSIS — D05.11 DUCTAL CARCINOMA IN SITU (DCIS) OF RIGHT BREAST: ICD-10-CM

## 2022-03-30 LAB
ALBUMIN SERPL BCP-MCNC: 3.9 G/DL (ref 3.5–5)
ALP SERPL-CCNC: 56 U/L (ref 46–116)
ALT SERPL W P-5'-P-CCNC: 26 U/L (ref 12–78)
ANION GAP SERPL CALCULATED.3IONS-SCNC: 8 MMOL/L (ref 4–13)
AST SERPL W P-5'-P-CCNC: 20 U/L (ref 5–45)
BASOPHILS # BLD AUTO: 0.03 THOUSANDS/ΜL (ref 0–0.1)
BASOPHILS NFR BLD AUTO: 0 % (ref 0–1)
BILIRUB SERPL-MCNC: 0.35 MG/DL (ref 0.2–1)
BUN SERPL-MCNC: 34 MG/DL (ref 5–25)
CALCIUM SERPL-MCNC: 9.3 MG/DL (ref 8.3–10.1)
CHLORIDE SERPL-SCNC: 104 MMOL/L (ref 100–108)
CO2 SERPL-SCNC: 28 MMOL/L (ref 21–32)
CREAT SERPL-MCNC: 1.3 MG/DL (ref 0.6–1.3)
EOSINOPHIL # BLD AUTO: 0.05 THOUSAND/ΜL (ref 0–0.61)
EOSINOPHIL NFR BLD AUTO: 1 % (ref 0–6)
ERYTHROCYTE [DISTWIDTH] IN BLOOD BY AUTOMATED COUNT: 12.9 % (ref 11.6–15.1)
EST. AVERAGE GLUCOSE BLD GHB EST-MCNC: 137 MG/DL
GFR SERPL CREATININE-BSD FRML MDRD: 41 ML/MIN/1.73SQ M
GLUCOSE SERPL-MCNC: 144 MG/DL (ref 65–140)
HBA1C MFR BLD: 6.4 %
HCT VFR BLD AUTO: 37.8 % (ref 34.8–46.1)
HGB BLD-MCNC: 12.4 G/DL (ref 11.5–15.4)
IMM GRANULOCYTES # BLD AUTO: 0.02 THOUSAND/UL (ref 0–0.2)
IMM GRANULOCYTES NFR BLD AUTO: 0 % (ref 0–2)
LYMPHOCYTES # BLD AUTO: 2 THOUSANDS/ΜL (ref 0.6–4.47)
LYMPHOCYTES NFR BLD AUTO: 23 % (ref 14–44)
MCH RBC QN AUTO: 30 PG (ref 26.8–34.3)
MCHC RBC AUTO-ENTMCNC: 32.8 G/DL (ref 31.4–37.4)
MCV RBC AUTO: 91 FL (ref 82–98)
MONOCYTES # BLD AUTO: 0.79 THOUSAND/ΜL (ref 0.17–1.22)
MONOCYTES NFR BLD AUTO: 9 % (ref 4–12)
NEUTROPHILS # BLD AUTO: 5.67 THOUSANDS/ΜL (ref 1.85–7.62)
NEUTS SEG NFR BLD AUTO: 67 % (ref 43–75)
NRBC BLD AUTO-RTO: 0 /100 WBCS
PLATELET # BLD AUTO: 156 THOUSANDS/UL (ref 149–390)
PMV BLD AUTO: 11.2 FL (ref 8.9–12.7)
POTASSIUM SERPL-SCNC: 5 MMOL/L (ref 3.5–5.3)
PROT SERPL-MCNC: 7.1 G/DL (ref 6.4–8.2)
RBC # BLD AUTO: 4.14 MILLION/UL (ref 3.81–5.12)
SODIUM SERPL-SCNC: 140 MMOL/L (ref 136–145)
WBC # BLD AUTO: 8.56 THOUSAND/UL (ref 4.31–10.16)

## 2022-03-30 PROCEDURE — 36415 COLL VENOUS BLD VENIPUNCTURE: CPT

## 2022-03-30 PROCEDURE — 85025 COMPLETE CBC W/AUTO DIFF WBC: CPT

## 2022-03-30 PROCEDURE — 83036 HEMOGLOBIN GLYCOSYLATED A1C: CPT

## 2022-03-30 PROCEDURE — 80053 COMPREHEN METABOLIC PANEL: CPT

## 2022-03-30 PROCEDURE — 99214 OFFICE O/P EST MOD 30 MIN: CPT | Performed by: SURGERY

## 2022-03-30 RX ORDER — SENNOSIDES 8.6 MG
650 CAPSULE ORAL AS NEEDED
COMMUNITY

## 2022-03-30 RX ORDER — OXYCODONE HYDROCHLORIDE AND ACETAMINOPHEN 5; 325 MG/1; MG/1
1 TABLET ORAL EVERY 6 HOURS PRN
Qty: 10 TABLET | Refills: 0 | Status: CANCELLED | OUTPATIENT
Start: 2022-03-30

## 2022-03-30 RX ORDER — FENOFIBRATE 145 MG/1
145 TABLET, COATED ORAL DAILY
COMMUNITY
Start: 2022-03-17

## 2022-03-30 RX ORDER — CEFAZOLIN SODIUM 1 G/50ML
1000 SOLUTION INTRAVENOUS ONCE
Status: CANCELLED | OUTPATIENT
Start: 2022-03-30 | End: 2022-03-30

## 2022-03-30 NOTE — PROGRESS NOTES
Surgical Oncology Follow Up  [facility]  [facility] Anilafeli Rodriguezrandall  [address]  [place] [address]    Trupti Salazar  1950  [id number]      Chief Complaint   Patient presents with    Follow-up     DCIS        Assessment & Plan: This 68-year-old female underwent right breast lumpectomy for atypical ductal hyperplasia lumpectomy specimen demonstrated ductal carcinoma in Situ negative margin however close anterior and lateral aspect of the specimen  She is healing well  She had genetic testing for 36 she has no evidence of breast cancer as was seated gene mutation  She had decision RT from the original specimen and read as high risk  However once we have completed re-excision we may send for retesting  Surgical consent was obtained for right breast lumpectomy for ductal newly diagnosed ductal carcinoma in Situ  We have explained to her original surgery was intended for atypical ductal hyperplasia rather than cancer operation she understand this is a cancer operation and the other will be wider margins were excised and concerns were cosmetic concerns were also discussed  We will arrange surgery  Cancer History:     Oncology History   Ductal carcinoma in situ (DCIS) of right breast   1/26/2022 Biopsy    Right breast stereotactic biopsy  1 o'clock  Breast tissue with focal atypical ductal hyperplasia, usual ductal hyperplasia, stromal fibrosis, and sclerosing adenosis with microcalcifications   Negative for in-situ or invasive carcinoma    Concordant  Biopsy clip is anterior to central aspect of architectural distortion  Jcarlos Rodriguez reflector was intentionally targeted posterior to biopsy clip, midway between the distortion and the biopsy clip        3/1/2022 Initial Diagnosis    Ductal carcinoma in situ (DCIS) of right breast     3/1/2022 Surgery    Right breast vivek  directed lumpectomy  Ductal carcinoma in situ  4 4 cm  Margins negative - distance from DCIS to anterior margin is less than 1 mm, distance from DCIS to lateral margin less than 2 mm    ER 90  HI 5     3/14/2022 Genetic Testing    [facility]  A total of 36 genes were evaluated, including: JOLANTA, BRCA1, BRCA2, CDH1, CHEK2, PALB2, PTEN, STK11, TP53  Negative result  No pathogenic sequence variants or deletions/duplications identified     3/14/2022 Genomic Testing    DCISionRT elevated  Score 8 0           Interval History:   Follow-up with right ductal carcinoma in Situ  Review of Systems:   Review of Systems   Constitutional: Negative for chills and fever  HENT: Negative for ear pain and sore throat  Eyes: Negative for pain and visual disturbance  Respiratory: Negative for cough and shortness of breath  Cardiovascular: Negative for chest pain and palpitations  Gastrointestinal: Negative for abdominal pain and vomiting  Genitourinary: Negative for dysuria and hematuria  Musculoskeletal: Negative for arthralgias and back pain  Skin: Negative for color change and rash  Neurological: Negative for seizures and syncope  All other systems reviewed and are negative        Past Medical History     Patient Active Problem List   Diagnosis    Tremor    Memory difficulty    Breast fibroadenoma in female, left    Atypical ductal hyperplasia of right breast    Ductal carcinoma in situ (DCIS) of right breast     Past Medical History:   Diagnosis Date    [facility] cancer ([facility] [address] ) 2015    chemo and surgery    Diabetes mellitus ([facility] [address] )     Ductal carcinoma in situ (DCIS) of right breast 3/10/2022    Hyperlipidemia     Hypertension     Skin cancer (melanoma) ([facility] [address] )      Past Surgical History:   Procedure Laterality Date    APPENDECTOMY      BACK SURGERY      BREAST BIOPSY Right 01/26/2022    right    BREAST LUMPECTOMY Right 3/1/2022    Procedure: RIGHT BREAST JOHN  DIRECTED LUMPECTOMY;  Surgeon: Brenden Mccartney MD;  Location: [facility];  Service: Surgical Oncology    CHOLECYSTECTOMY      COLON SURGERY      COLONOSCOPY      HYSTERECTOMY  2000    MAMMO NEEDLE LOCALIZATION LEFT (ALL INC) Right 2/24/2022    MAMMO STEREOTACTIC BREAST BIOPSY RIGHT (ALL INC) Right 1/26/2022    US GUIDED BREAST BIOPSY LEFT COMPLETE Left 1/26/2022     Family History   Problem Relation Age of Onset    No Known Problems Mother     No Known Problems Father     No Known Problems Sister     Lung cancer Brother     No Known Problems Maternal Grandmother     No Known Problems Maternal Grandfather     No Known Problems Paternal Grandmother     No Known Problems Paternal Grandfather     No Known Problems Daughter     No Known Problems Maternal Aunt     No Known Problems Paternal Aunt     Other Paternal Aunt         oral cancer    Breast cancer Paternal Aunt         age unknown   Kumar Breast cancer Paternal [place] Bekah         age unknown    Breast cancer Cousin         age unknown    Stomach cancer Paternal Uncle     No Known Problems Son     No Known Problems Son      Social History     Socioeconomic History    Marital status:      Spouse name: Not on file    Number of children: Not on file    Years of education: Not on file    Highest education level: Not on file   Occupational History    Not on file   Tobacco Use    Smoking status: Current Every Day Smoker     Packs/day: 0 50     Years: 55 00     Pack years: 27 50    Smokeless tobacco: Never Used   Vaping Use    Vaping Use: Never used   Substance and Sexual Activity    Alcohol use: Never    Drug use: Never    Sexual activity: Not Currently   Other Topics Concern    Not on file   Social History Narrative    Not on file     Social Determinants of Health     Financial Resource Strain: Not on file   Food Insecurity: Not on file   Transportation Needs: Not on file   Physical Activity: Not on file   Stress: Not on file   Social Connections: Not on file   Intimate Partner Violence: Not on file   Housing Stability: Not on file Current Outpatient Medications:     acetaminophen (TYLENOL) 650 mg CR tablet, Take 650 mg by mouth as needed for mild pain, Disp: , Rfl:     atorvastatin (LIPITOR) 20 mg tablet, Take 20 mg by mouth daily, Disp: , Rfl:     donepezil (ARICEPT) 10 mg tablet, Take 1 tablet (10 mg total) by mouth daily at bedtime, Disp: 30 tablet, Rfl: 5    fenofibrate (TRICOR) 145 mg tablet, Take 145 mg by mouth daily, Disp: , Rfl:     gabapentin (NEURONTIN) 600 MG tablet, Take 300 mg by mouth 2 (two) times a day , Disp: , Rfl:     glimepiride (AMARYL) 4 mg tablet, Take 4 mg by mouth daily with breakfast, Disp: , Rfl:     hydrochlorothiazide (HYDRODIURIL) 12 5 mg tablet, Take 12 5 mg by mouth daily, Disp: , Rfl:     HYDROcodone-acetaminophen (Norco) 5-325 mg per tablet, Take 1 tablet by mouth every 6 (six) hours as needed for pain, Disp: 10 tablet, Rfl: 0    irbesartan (AVAPRO) 300 mg tablet, Take 300 mg by mouth daily, Disp: , Rfl:     magnesium oxide (MAG-OX) 400 mg tablet, Take 400 mg by mouth daily, Disp: , Rfl:     Melatonin 10 MG TABS, Take 20 mg by mouth daily at bedtime as needed, Disp: , Rfl:     metFORMIN (GLUCOPHAGE) 1000 MG tablet, Take 1,000 mg by mouth 2 (two) times a day with meals  , Disp: , Rfl:     metoprolol succinate (TOPROL-XL) 50 mg 24 hr tablet, Take 75 mg by mouth daily, Disp: , Rfl:     omeprazole (PriLOSEC) 40 MG capsule, Take 40 mg by mouth daily, Disp: , Rfl:     oxybutynin (DITROPAN-XL) 5 mg 24 hr tablet, Take 5 mg by mouth daily, Disp: , Rfl:     sitaGLIPtin (JANUVIA) 100 mg tablet, Take 100 mg by mouth daily, Disp: , Rfl:     amitriptyline (ELAVIL) 50 mg tablet, Take 50 mg by mouth daily at bedtime (Patient not taking: Reported on 11/24/2021 ), Disp: , Rfl:     amLODIPine (NORVASC) 2 5 mg tablet, Take 2 5 mg by mouth daily (Patient not taking: Reported on 11/24/2021 ), Disp: , Rfl:   Allergies   Allergen Reactions    Codeine GI Intolerance    Morphine GI Intolerance       Physical Exam:     Vitals:    03/30/22 1048   BP: 116/80   Pulse: (!) 53   Resp: 14   Temp: (!) 97 3 °F (36 3 °C)   SpO2: 97%     Physical Exam  Constitutional:       Appearance: Normal appearance  HENT:      Head: Normocephalic and atraumatic  Nose: Nose normal       Mouth/Throat:      Mouth: Mucous membranes are moist    Eyes:      Pupils: Pupils are equal, round, and reactive to light  Cardiovascular:      Rate and Rhythm: Normal rate  Pulses: Normal pulses  Heart sounds: Normal heart sounds  Pulmonary:      Effort: Pulmonary effort is normal       Breath sounds: Normal breath sounds  Chest:          Comments: Right breast incision is well-healed bilateral breast examination no palpable mass or masses  Abdominal:      General: Bowel sounds are normal       Palpations: Abdomen is soft  Musculoskeletal:         General: Normal range of motion  Cervical back: Normal range of motion and neck supple  Skin:     General: Skin is warm  Neurological:      General: No focal deficit present  Mental Status: She is alert and oriented to person, place, and time  Psychiatric:         Mood and Affect: Mood normal          Behavior: Behavior normal          Thought Content: Thought content normal          Judgment: Judgment normal            Results & Discussion:   I did review genetic test results, pre decision RT results as well as last pathology results in detail  We also discussed is for reason for repeat surgery due to newly diagnosis unexpected ductal carcinoma in Situ diagnoses  She she understand that she may need additional surgery as well as further treatment based on final pathology  she understands and  agrees   All patient questions were answered  Advance Care Planning/Advance Directives: Laron Zabala MD discussed the disease status with Cesia Sequeira  today 03/30/22  treatment plans and follow-up with the patient

## 2022-03-30 NOTE — H&P (VIEW-ONLY)
Surgical Oncology Follow Up  [facility]  [facility] Jennifer aMnriquez  [address]  [place] [address]    Stephanie Lozano  1950  [id number]      Chief Complaint   Patient presents with    Follow-up     DCIS        Assessment & Plan: This 68-year-old female underwent right breast lumpectomy for atypical ductal hyperplasia lumpectomy specimen demonstrated ductal carcinoma in Situ negative margin however close anterior and lateral aspect of the specimen  She is healing well  She had genetic testing for 36 she has no evidence of breast cancer as was seated gene mutation  She had decision RT from the original specimen and read as high risk  However once we have completed re-excision we may send for retesting  Surgical consent was obtained for right breast lumpectomy for ductal newly diagnosed ductal carcinoma in Situ  We have explained to her original surgery was intended for atypical ductal hyperplasia rather than cancer operation she understand this is a cancer operation and the other will be wider margins were excised and concerns were cosmetic concerns were also discussed  We will arrange surgery  Cancer History:     Oncology History   Ductal carcinoma in situ (DCIS) of right breast   1/26/2022 Biopsy    Right breast stereotactic biopsy  1 o'clock  Breast tissue with focal atypical ductal hyperplasia, usual ductal hyperplasia, stromal fibrosis, and sclerosing adenosis with microcalcifications   Negative for in-situ or invasive carcinoma    Concordant  Biopsy clip is anterior to central aspect of architectural distortion  Debbe Needs reflector was intentionally targeted posterior to biopsy clip, midway between the distortion and the biopsy clip        3/1/2022 Initial Diagnosis    Ductal carcinoma in situ (DCIS) of right breast     3/1/2022 Surgery    Right breast vivek  directed lumpectomy  Ductal carcinoma in situ  4 4 cm  Margins negative - distance from DCIS to anterior margin is less than 1 mm, distance from DCIS to lateral margin less than 2 mm    ER 90  MT 5     3/14/2022 Genetic Testing    [facility]  A total of 36 genes were evaluated, including: JOLANTA, BRCA1, BRCA2, CDH1, CHEK2, PALB2, PTEN, STK11, TP53  Negative result  No pathogenic sequence variants or deletions/duplications identified     3/14/2022 Genomic Testing    DCISionRT elevated  Score 8 0           Interval History:   Follow-up with right ductal carcinoma in Situ  Review of Systems:   Review of Systems   Constitutional: Negative for chills and fever  HENT: Negative for ear pain and sore throat  Eyes: Negative for pain and visual disturbance  Respiratory: Negative for cough and shortness of breath  Cardiovascular: Negative for chest pain and palpitations  Gastrointestinal: Negative for abdominal pain and vomiting  Genitourinary: Negative for dysuria and hematuria  Musculoskeletal: Negative for arthralgias and back pain  Skin: Negative for color change and rash  Neurological: Negative for seizures and syncope  All other systems reviewed and are negative        Past Medical History     Patient Active Problem List   Diagnosis    Tremor    Memory difficulty    Breast fibroadenoma in female, left    Atypical ductal hyperplasia of right breast    Ductal carcinoma in situ (DCIS) of right breast     Past Medical History:   Diagnosis Date    Colon cancer ([facility] [address] ) 2015    chemo and surgery    Diabetes mellitus ([facility] [address] )     Ductal carcinoma in situ (DCIS) of right breast 3/10/2022    Hyperlipidemia     Hypertension     Skin cancer (melanoma) ([facility] [address] )      Past Surgical History:   Procedure Laterality Date    APPENDECTOMY      BACK SURGERY      BREAST BIOPSY Right 01/26/2022    right    BREAST LUMPECTOMY Right 3/1/2022    Procedure: RIGHT BREAST JOHN  DIRECTED LUMPECTOMY;  Surgeon: Yassine Cruz MD;  Location: [facility];  Service: Surgical Oncology    CHOLECYSTECTOMY      COLON SURGERY      COLONOSCOPY      HYSTERECTOMY  2000    MAMMO NEEDLE LOCALIZATION LEFT (ALL INC) Right 2/24/2022    MAMMO STEREOTACTIC BREAST BIOPSY RIGHT (ALL INC) Right 1/26/2022    US GUIDED BREAST BIOPSY LEFT COMPLETE Left 1/26/2022     Family History   Problem Relation Age of Onset    No Known Problems Mother     No Known Problems Father     No Known Problems Sister     Lung cancer Brother     No Known Problems Maternal Grandmother     No Known Problems Maternal Grandfather     No Known Problems Paternal Grandmother     No Known Problems Paternal Grandfather     No Known Problems Daughter     No Known Problems Maternal Aunt     No Known Problems Paternal Aunt     Other Paternal Aunt         oral cancer    Breast cancer Paternal Aunt         age unknown   Enedina Chance Breast cancer Paternal [de-identified]         age unknown    Breast cancer Cousin         age unknown    Stomach cancer Paternal Uncle     No Known Problems Son     No Known Problems Son      Social History     Socioeconomic History    Marital status:      Spouse name: Not on file    Number of children: Not on file    Years of education: Not on file    Highest education level: Not on file   Occupational History    Not on file   Tobacco Use    Smoking status: Current Every Day Smoker     Packs/day: 0 50     Years: 55 00     Pack years: 27 50    Smokeless tobacco: Never Used   Vaping Use    Vaping Use: Never used   Substance and Sexual Activity    Alcohol use: Never    Drug use: Never    Sexual activity: Not Currently   Other Topics Concern    Not on file   Social History Narrative    Not on file     Social Determinants of Health     Financial Resource Strain: Not on file   Food Insecurity: Not on file   Transportation Needs: Not on file   Physical Activity: Not on file   Stress: Not on file   Social Connections: Not on file   Intimate Partner Violence: Not on file   Housing Stability: Not on file Current Outpatient Medications:     acetaminophen (TYLENOL) 650 mg CR tablet, Take 650 mg by mouth as needed for mild pain, Disp: , Rfl:     atorvastatin (LIPITOR) 20 mg tablet, Take 20 mg by mouth daily, Disp: , Rfl:     donepezil (ARICEPT) 10 mg tablet, Take 1 tablet (10 mg total) by mouth daily at bedtime, Disp: 30 tablet, Rfl: 5    fenofibrate (TRICOR) 145 mg tablet, Take 145 mg by mouth daily, Disp: , Rfl:     gabapentin (NEURONTIN) 600 MG tablet, Take 300 mg by mouth 2 (two) times a day , Disp: , Rfl:     glimepiride (AMARYL) 4 mg tablet, Take 4 mg by mouth daily with breakfast, Disp: , Rfl:     hydrochlorothiazide (HYDRODIURIL) 12 5 mg tablet, Take 12 5 mg by mouth daily, Disp: , Rfl:     HYDROcodone-acetaminophen (Norco) 5-325 mg per tablet, Take 1 tablet by mouth every 6 (six) hours as needed for pain, Disp: 10 tablet, Rfl: 0    irbesartan (AVAPRO) 300 mg tablet, Take 300 mg by mouth daily, Disp: , Rfl:     magnesium oxide (MAG-OX) 400 mg tablet, Take 400 mg by mouth daily, Disp: , Rfl:     Melatonin 10 MG TABS, Take 20 mg by mouth daily at bedtime as needed, Disp: , Rfl:     metFORMIN (GLUCOPHAGE) 1000 MG tablet, Take 1,000 mg by mouth 2 (two) times a day with meals  , Disp: , Rfl:     metoprolol succinate (TOPROL-XL) 50 mg 24 hr tablet, Take 75 mg by mouth daily, Disp: , Rfl:     omeprazole (PriLOSEC) 40 MG capsule, Take 40 mg by mouth daily, Disp: , Rfl:     oxybutynin (DITROPAN-XL) 5 mg 24 hr tablet, Take 5 mg by mouth daily, Disp: , Rfl:     sitaGLIPtin (JANUVIA) 100 mg tablet, Take 100 mg by mouth daily, Disp: , Rfl:     amitriptyline (ELAVIL) 50 mg tablet, Take 50 mg by mouth daily at bedtime (Patient not taking: Reported on 11/24/2021 ), Disp: , Rfl:     amLODIPine (NORVASC) 2 5 mg tablet, Take 2 5 mg by mouth daily (Patient not taking: Reported on 11/24/2021 ), Disp: , Rfl:   Allergies   Allergen Reactions    Codeine GI Intolerance    Morphine GI Intolerance       Physical Exam:     Vitals:    03/30/22 1048   BP: 116/80   Pulse: (!) 53   Resp: 14   Temp: (!) 97 3 °F (36 3 °C)   SpO2: 97%     Physical Exam  Constitutional:       Appearance: Normal appearance  HENT:      Head: Normocephalic and atraumatic  Nose: Nose normal       Mouth/Throat:      Mouth: Mucous membranes are moist    Eyes:      Pupils: Pupils are equal, round, and reactive to light  Cardiovascular:      Rate and Rhythm: Normal rate  Pulses: Normal pulses  Heart sounds: Normal heart sounds  Pulmonary:      Effort: Pulmonary effort is normal       Breath sounds: Normal breath sounds  Chest:          Comments: Right breast incision is well-healed bilateral breast examination no palpable mass or masses  Abdominal:      General: Bowel sounds are normal       Palpations: Abdomen is soft  Musculoskeletal:         General: Normal range of motion  Cervical back: Normal range of motion and neck supple  Skin:     General: Skin is warm  Neurological:      General: No focal deficit present  Mental Status: She is alert and oriented to person, place, and time  Psychiatric:         Mood and Affect: Mood normal          Behavior: Behavior normal          Thought Content: Thought content normal          Judgment: Judgment normal            Results & Discussion:   I did review genetic test results, pre decision RT results as well as last pathology results in detail  We also discussed is for reason for repeat surgery due to newly diagnosis unexpected ductal carcinoma in Situ diagnoses  She she understand that she may need additional surgery as well as further treatment based on final pathology  she understands and  agrees   All patient questions were answered  Advance Care Planning/Advance Directives: Alondra Cao MD discussed the disease status with Michele Mendez  today 03/30/22  treatment plans and follow-up with the patient

## 2022-04-11 ENCOUNTER — OFFICE VISIT (OUTPATIENT)
Dept: NEUROLOGY | Facility: CLINIC | Age: 72
End: 2022-04-11
Payer: COMMERCIAL

## 2022-04-11 VITALS
HEART RATE: 64 BPM | SYSTOLIC BLOOD PRESSURE: 140 MMHG | HEIGHT: 62 IN | TEMPERATURE: 97.3 F | OXYGEN SATURATION: 99 % | BODY MASS INDEX: 28.35 KG/M2 | DIASTOLIC BLOOD PRESSURE: 80 MMHG

## 2022-04-11 DIAGNOSIS — R25.1 TREMOR: ICD-10-CM

## 2022-04-11 DIAGNOSIS — R41.3 MEMORY DIFFICULTY: Primary | ICD-10-CM

## 2022-04-11 PROCEDURE — 99214 OFFICE O/P EST MOD 30 MIN: CPT | Performed by: PSYCHIATRY & NEUROLOGY

## 2022-04-11 NOTE — PROGRESS NOTES
Mirian Augustin is a 73 y o  female  Chief Complaint   Patient presents with    Memory difficulty,    Tremors       Assessment:  1  Memory difficulty    2  Tremor        Plan:  Continue with Aricept 10 mg a day  Mentally stimulating exercises  To be under constant supervision and do regular exercise  Healthy diet  Keep blood pressure cholesterol and sugar under control  Quit smoking  Fall and safety precautions  Follow-up in 4 months  Discussion:  Patient has mild cognitive impairment with a [place] of 24/30, she is on Aricept 10 mg once a day she was advised to continue with same, she may need to go on Namenda in the future, also differential diagnosis of tremor discussed with the patient she has most likely benign essential tremor I discussed with patient different medication options including primidone currently her tremor is not that bothersome and hence she would like to  hold off on medications, she was advised to quit smoking, also  was advised to take fall and safety precautions, to go to the hospital if has any worsening symptoms and call me otherwise to see me back in 4 months and follow up with the other physicians  Subjective:    HPI   Patient is here accompanied with her son in follow-up for her tremor and memory difficulty for the last year and a half, since her last visit she feels that memory is about the same she still has some issues with short-term memory, she still has some intentional tremor mostly on holding things but no resting tremor no hallucinations no freezing episodes she does repeat herself, she denies any bowel and bladder incontinence, her weight has been stable, no other complaints      Vitals:    04/11/22 0851   BP: 140/80   BP Location: Left arm   Patient Position: Sitting   Cuff Size: Adult   Pulse: 64   Temp: (!) 97 3 °F (36 3 °C)   TempSrc: Temporal   SpO2: 99%   Height: 5' 2" (1 575 m)       Current Medications    Current Outpatient Medications:     acetaminophen (TYLENOL) 650 mg CR tablet, Take 650 mg by mouth as needed for mild pain, Disp: , Rfl:     amLODIPine (NORVASC) 2 5 mg tablet, Take 2 5 mg by mouth daily  , Disp: , Rfl:     atorvastatin (LIPITOR) 20 mg tablet, Take 20 mg by mouth daily, Disp: , Rfl:     donepezil (ARICEPT) 10 mg tablet, Take 1 tablet (10 mg total) by mouth daily at bedtime, Disp: 30 tablet, Rfl: 5    fenofibrate (TRICOR) 145 mg tablet, Take 145 mg by mouth daily, Disp: , Rfl:     gabapentin (NEURONTIN) 600 MG tablet, Take 300 mg by mouth 2 (two) times a day , Disp: , Rfl:     glimepiride (AMARYL) 4 mg tablet, Take 4 mg by mouth daily with breakfast, Disp: , Rfl:     hydrochlorothiazide (HYDRODIURIL) 12 5 mg tablet, Take 12 5 mg by mouth daily, Disp: , Rfl:     HYDROcodone-acetaminophen (Norco) 5-325 mg per tablet, Take 1 tablet by mouth every 6 (six) hours as needed for pain, Disp: 10 tablet, Rfl: 0    irbesartan (AVAPRO) 300 mg tablet, Take 300 mg by mouth daily, Disp: , Rfl:     magnesium oxide (MAG-OX) 400 mg tablet, Take 400 mg by mouth daily, Disp: , Rfl:     Melatonin 10 MG TABS, Take 20 mg by mouth daily at bedtime as needed, Disp: , Rfl:     metFORMIN (GLUCOPHAGE) 1000 MG tablet, Take 1,000 mg by mouth 2 (two) times a day with meals  , Disp: , Rfl:     metoprolol succinate (TOPROL-XL) 50 mg 24 hr tablet, Take 75 mg by mouth daily, Disp: , Rfl:     omeprazole (PriLOSEC) 40 MG capsule, Take 40 mg by mouth daily, Disp: , Rfl:     oxybutynin (DITROPAN-XL) 5 mg 24 hr tablet, Take 5 mg by mouth daily, Disp: , Rfl:     sitaGLIPtin (JANUVIA) 100 mg tablet, Take 100 mg by mouth daily, Disp: , Rfl:     amitriptyline (ELAVIL) 50 mg tablet, Take 50 mg by mouth daily at bedtime (Patient not taking: Reported on 11/24/2021 ), Disp: , Rfl:       Allergies  Codeine and Morphine    Past Medical History  Past Medical History:   Diagnosis Date    Colon cancer ([facility] [address] ) 2015    chemo and surgery    [facility]     Ductal carcinoma in situ (DCIS) of right breast 3/10/2022    Hyperlipidemia     Hypertension     Skin cancer (melanoma) ([facility] [address] )          Past Surgical History:  Past Surgical History:   Procedure Laterality Date    APPENDECTOMY      BACK SURGERY      BREAST BIOPSY Right 01/26/2022    right    BREAST LUMPECTOMY Right 3/1/2022    Procedure: RIGHT BREAST JOHN  DIRECTED LUMPECTOMY;  Surgeon: Ishaan Hoffmann MD;  Location: [facility];  Service: Surgical Oncology    CHOLECYSTECTOMY      COLON SURGERY      COLONOSCOPY      HYSTERECTOMY  2000    MAMMO NEEDLE LOCALIZATION LEFT (ALL INC) Right 2/24/2022    MAMMO STEREOTACTIC BREAST BIOPSY RIGHT (ALL [employer]) Right 1/26/2022    US GUIDED BREAST BIOPSY LEFT COMPLETE Left 1/26/2022         Family History:  Family History   Problem Relation Age of Onset    No Known Problems Mother     No Known Problems Father     No Known Problems Sister     Lung cancer Brother     No Known Problems Maternal Grandmother     No Known Problems Maternal Grandfather     No Known Problems Paternal Grandmother     No Known Problems Paternal Grandfather     No Known Problems Daughter     No Known Problems Maternal Aunt     No Known Problems Paternal Aunt     Other Paternal Aunt         oral cancer    Breast cancer Paternal [de-identified]         age unknown   Cristino Emerson Breast cancer Paternal [de-identified]         age unknown    Breast cancer Cousin         age unknown    Stomach cancer Paternal Uncle     No Known Problems Son     No Known Problems Son        Social History:   reports that she has been smoking  She has a 27 50 pack-year smoking history  She has never used smokeless tobacco  She reports that she does not drink alcohol and does not use drugs  I have reviewed the past medical history, surgical history, social and family history, current medications, allergies vitals, review of systems, and updated this information as appropriate today     Objective:    Physical Exam    Neurological Exam    GENERAL:  Cooperative in no acute distress  Well-developed and well-nourished    HEAD and NECK   Head is atraumatic normocephalic with no lesions or masses  Neck is supple with full range of motion    CARDIOVASCULAR  Carotid Arteries-no carotid bruits  NEUROLOGIC:  Mental Status-the patient is awake alert and oriented without aphasia or apraxia, [place] is 24/30  Cranial Nerves: Visual fields are full to confrontation  Extraocular movements are full without nystagmus  Pupils are 2-1/2 mm and reactive  Face is symmetrical to light touch  Movements of facial expression move symmetrically  Hearing is normal to finger rub bilaterally  Soft palate lifts symmetrically  Shoulder shrug is symmetrical  Tongue is midline without atrophy  Motor: No drift is noted on arm extension  Strength is full in the upper and lower extremities with normal bulk and tone  Mild tremor on outstretched hands  Gait is unremarkable          ROS:  Review of Systems   Constitutional: Negative  Negative for appetite change and fever  HENT: Negative  Negative for hearing loss, tinnitus, trouble swallowing and voice change  Eyes: Negative  Negative for photophobia and pain  Respiratory: Negative  Negative for shortness of breath  Cardiovascular: Negative  Negative for palpitations  Gastrointestinal: Negative  Negative for nausea and vomiting  Endocrine: Negative  Negative for cold intolerance  Genitourinary: Positive for urgency  Negative for dysuria and frequency  Musculoskeletal: Negative  Negative for myalgias and neck pain  Skin: Negative  Negative for rash  Neurological: Positive for tremors and light-headedness  Negative for dizziness, seizures, syncope, facial asymmetry, speech difficulty, weakness, numbness and headaches  Patient states tremors has gotten worse   Hematological: Negative  Does not bruise/bleed easily  Psychiatric/Behavioral: Positive for confusion and sleep disturbance  Negative for hallucinations

## 2022-04-19 ENCOUNTER — HOSPITAL ENCOUNTER (OUTPATIENT)
Facility: HOSPITAL | Age: 72
Setting detail: OUTPATIENT SURGERY
Discharge: HOME | End: 2022-04-19
Attending: SURGERY | Admitting: SURGERY
Payer: COMMERCIAL

## 2022-04-19 ENCOUNTER — ANESTHESIA EVENT (OUTPATIENT)
Dept: PERIOP | Facility: HOSPITAL | Age: 72
End: 2022-04-19
Payer: COMMERCIAL

## 2022-04-19 ENCOUNTER — ANESTHESIA (OUTPATIENT)
Dept: PERIOP | Facility: HOSPITAL | Age: 72
End: 2022-04-19
Payer: COMMERCIAL

## 2022-04-19 VITALS
BODY MASS INDEX: 28.48 KG/M2 | OXYGEN SATURATION: 98 % | SYSTOLIC BLOOD PRESSURE: 156 MMHG | WEIGHT: 154.76 LBS | DIASTOLIC BLOOD PRESSURE: 76 MMHG | HEIGHT: 62 IN | HEART RATE: 65 BPM | RESPIRATION RATE: 18 BRPM | TEMPERATURE: 97.8 F

## 2022-04-19 DIAGNOSIS — D05.11 DUCTAL CARCINOMA IN SITU (DCIS) OF RIGHT BREAST: ICD-10-CM

## 2022-04-19 DIAGNOSIS — D05.11 DUCTAL CARCINOMA IN SITU (DCIS) OF RIGHT BREAST: Primary | ICD-10-CM

## 2022-04-19 PROBLEM — E11.59 HYPERTENSION ASSOCIATED WITH DIABETES (HCC): Status: ACTIVE | Noted: 2021-06-22

## 2022-04-19 PROBLEM — I15.2 HYPERTENSION ASSOCIATED WITH DIABETES (HCC): Status: ACTIVE | Noted: 2021-06-22

## 2022-04-19 PROBLEM — N18.31 STAGE 3A CHRONIC KIDNEY DISEASE (HCC): Status: ACTIVE | Noted: 2021-11-17

## 2022-04-19 PROBLEM — K21.9 GASTROESOPHAGEAL REFLUX DISEASE: Status: ACTIVE | Noted: 2022-04-19

## 2022-04-19 PROBLEM — C18.9 COLON CANCER (HCC): Status: ACTIVE | Noted: 2021-11-17

## 2022-04-19 PROBLEM — F41.1 GENERALIZED ANXIETY DISORDER: Status: ACTIVE | Noted: 2021-06-22

## 2022-04-19 PROBLEM — E08.21 DIABETES DUE TO UNDERLYING CONDITION W DIABETIC NEPHROPATHY (HCC): Status: ACTIVE | Noted: 2022-04-19

## 2022-04-19 LAB — GLUCOSE SERPL-MCNC: 147 MG/DL (ref 65–140)

## 2022-04-19 PROCEDURE — 82948 REAGENT STRIP/BLOOD GLUCOSE: CPT

## 2022-04-19 PROCEDURE — 88307 TISSUE EXAM BY PATHOLOGIST: CPT | Performed by: PATHOLOGY

## 2022-04-19 PROCEDURE — 19301 PARTIAL MASTECTOMY: CPT | Performed by: CLINICAL NURSE SPECIALIST

## 2022-04-19 PROCEDURE — 88342 IMHCHEM/IMCYTCHM 1ST ANTB: CPT | Performed by: PATHOLOGY

## 2022-04-19 PROCEDURE — 19301 PARTIAL MASTECTOMY: CPT | Performed by: SURGERY

## 2022-04-19 RX ORDER — ONDANSETRON 2 MG/ML
INJECTION INTRAMUSCULAR; INTRAVENOUS AS NEEDED
Status: DISCONTINUED | OUTPATIENT
Start: 2022-04-19 | End: 2022-04-19

## 2022-04-19 RX ORDER — SODIUM CHLORIDE, SODIUM LACTATE, POTASSIUM CHLORIDE, CALCIUM CHLORIDE 600; 310; 30; 20 MG/100ML; MG/100ML; MG/100ML; MG/100ML
125 INJECTION, SOLUTION INTRAVENOUS CONTINUOUS
Status: DISCONTINUED | OUTPATIENT
Start: 2022-04-19 | End: 2022-04-19 | Stop reason: HOSPADM

## 2022-04-19 RX ORDER — FENTANYL CITRATE 50 UG/ML
INJECTION, SOLUTION INTRAMUSCULAR; INTRAVENOUS AS NEEDED
Status: DISCONTINUED | OUTPATIENT
Start: 2022-04-19 | End: 2022-04-19

## 2022-04-19 RX ORDER — OXYCODONE HYDROCHLORIDE AND ACETAMINOPHEN 5; 325 MG/1; MG/1
1 TABLET ORAL EVERY 6 HOURS PRN
Qty: 20 TABLET | Refills: 0 | Status: SHIPPED | OUTPATIENT
Start: 2022-04-19

## 2022-04-19 RX ORDER — PROPOFOL 10 MG/ML
INJECTION, EMULSION INTRAVENOUS AS NEEDED
Status: DISCONTINUED | OUTPATIENT
Start: 2022-04-19 | End: 2022-04-19

## 2022-04-19 RX ORDER — MAGNESIUM HYDROXIDE 1200 MG/15ML
LIQUID ORAL AS NEEDED
Status: DISCONTINUED | OUTPATIENT
Start: 2022-04-19 | End: 2022-04-19 | Stop reason: HOSPADM

## 2022-04-19 RX ORDER — DEXAMETHASONE SODIUM PHOSPHATE 10 MG/ML
INJECTION, SOLUTION INTRAMUSCULAR; INTRAVENOUS AS NEEDED
Status: DISCONTINUED | OUTPATIENT
Start: 2022-04-19 | End: 2022-04-19

## 2022-04-19 RX ORDER — CEFAZOLIN SODIUM 1 G/50ML
1000 SOLUTION INTRAVENOUS ONCE
Status: COMPLETED | OUTPATIENT
Start: 2022-04-19 | End: 2022-04-19

## 2022-04-19 RX ORDER — METOCLOPRAMIDE HYDROCHLORIDE 5 MG/ML
10 INJECTION INTRAMUSCULAR; INTRAVENOUS ONCE AS NEEDED
Status: DISCONTINUED | OUTPATIENT
Start: 2022-04-19 | End: 2022-04-19 | Stop reason: HOSPADM

## 2022-04-19 RX ORDER — GLYCOPYRROLATE 0.2 MG/ML
INJECTION INTRAMUSCULAR; INTRAVENOUS AS NEEDED
Status: DISCONTINUED | OUTPATIENT
Start: 2022-04-19 | End: 2022-04-19

## 2022-04-19 RX ORDER — OXYCODONE HYDROCHLORIDE AND ACETAMINOPHEN 5; 325 MG/1; MG/1
1 TABLET ORAL EVERY 4 HOURS PRN
Status: DISCONTINUED | OUTPATIENT
Start: 2022-04-19 | End: 2022-04-19 | Stop reason: HOSPADM

## 2022-04-19 RX ORDER — PROPOFOL 10 MG/ML
INJECTION, EMULSION INTRAVENOUS CONTINUOUS PRN
Status: DISCONTINUED | OUTPATIENT
Start: 2022-04-19 | End: 2022-04-19

## 2022-04-19 RX ORDER — DEXMEDETOMIDINE HYDROCHLORIDE 100 UG/ML
INJECTION, SOLUTION INTRAVENOUS AS NEEDED
Status: DISCONTINUED | OUTPATIENT
Start: 2022-04-19 | End: 2022-04-19

## 2022-04-19 RX ORDER — FENTANYL CITRATE/PF 50 MCG/ML
25 SYRINGE (ML) INJECTION
Status: DISCONTINUED | OUTPATIENT
Start: 2022-04-19 | End: 2022-04-19 | Stop reason: HOSPADM

## 2022-04-19 RX ADMIN — FENTANYL CITRATE 50 MCG: 50 INJECTION, SOLUTION INTRAMUSCULAR; INTRAVENOUS at 14:43

## 2022-04-19 RX ADMIN — FENTANYL CITRATE 25 MCG: 50 INJECTION, SOLUTION INTRAMUSCULAR; INTRAVENOUS at 15:13

## 2022-04-19 RX ADMIN — FENTANYL CITRATE 25 MCG: 50 INJECTION, SOLUTION INTRAMUSCULAR; INTRAVENOUS at 15:08

## 2022-04-19 RX ADMIN — FENTANYL CITRATE 25 MCG: 50 INJECTION, SOLUTION INTRAMUSCULAR; INTRAVENOUS at 15:02

## 2022-04-19 RX ADMIN — FENTANYL CITRATE 25 MCG: 50 INJECTION, SOLUTION INTRAMUSCULAR; INTRAVENOUS at 16:16

## 2022-04-19 RX ADMIN — DEXMEDETOMIDINE HCL 8 MCG: 100 INJECTION INTRAVENOUS at 15:10

## 2022-04-19 RX ADMIN — FENTANYL CITRATE 25 MCG: 50 INJECTION, SOLUTION INTRAMUSCULAR; INTRAVENOUS at 16:11

## 2022-04-19 RX ADMIN — FENTANYL CITRATE 25 MCG: 50 INJECTION, SOLUTION INTRAMUSCULAR; INTRAVENOUS at 15:05

## 2022-04-19 RX ADMIN — DEXAMETHASONE SODIUM PHOSPHATE 4 MG: 10 INJECTION, SOLUTION INTRAMUSCULAR; INTRAVENOUS at 15:11

## 2022-04-19 RX ADMIN — FENTANYL CITRATE 25 MCG: 50 INJECTION, SOLUTION INTRAMUSCULAR; INTRAVENOUS at 15:10

## 2022-04-19 RX ADMIN — PROPOFOL 150 MG: 10 INJECTION, EMULSION INTRAVENOUS at 14:45

## 2022-04-19 RX ADMIN — DEXMEDETOMIDINE HCL 8 MCG: 100 INJECTION INTRAVENOUS at 15:07

## 2022-04-19 RX ADMIN — SODIUM CHLORIDE 0.2 MCG/KG/HR: 9 INJECTION, SOLUTION INTRAVENOUS at 14:46

## 2022-04-19 RX ADMIN — ONDANSETRON 4 MG: 2 INJECTION INTRAMUSCULAR; INTRAVENOUS at 15:09

## 2022-04-19 RX ADMIN — OXYCODONE HYDROCHLORIDE AND ACETAMINOPHEN 1 TABLET: 5; 325 TABLET ORAL at 17:04

## 2022-04-19 RX ADMIN — PROPOFOL 50 MCG/KG/MIN: 10 INJECTION, EMULSION INTRAVENOUS at 14:46

## 2022-04-19 RX ADMIN — CEFAZOLIN SODIUM 1000 MG: 1 SOLUTION INTRAVENOUS at 14:14

## 2022-04-19 RX ADMIN — SODIUM CHLORIDE, SODIUM LACTATE, POTASSIUM CHLORIDE, AND CALCIUM CHLORIDE 125 ML/HR: .6; .31; .03; .02 INJECTION, SOLUTION INTRAVENOUS at 13:10

## 2022-04-19 RX ADMIN — FENTANYL CITRATE 25 MCG: 50 INJECTION, SOLUTION INTRAMUSCULAR; INTRAVENOUS at 16:05

## 2022-04-19 RX ADMIN — PROPOFOL 50 MG: 10 INJECTION, EMULSION INTRAVENOUS at 14:46

## 2022-04-19 RX ADMIN — FENTANYL CITRATE 25 MCG: 50 INJECTION, SOLUTION INTRAMUSCULAR; INTRAVENOUS at 15:56

## 2022-04-19 RX ADMIN — FENTANYL CITRATE 25 MCG: 50 INJECTION, SOLUTION INTRAMUSCULAR; INTRAVENOUS at 15:00

## 2022-04-19 RX ADMIN — GLYCOPYRROLATE 0.2 MG: 0.2 INJECTION, SOLUTION INTRAMUSCULAR; INTRAVENOUS at 14:44

## 2022-04-19 NOTE — ANESTHESIA PREPROCEDURE EVALUATION
Procedure:  LUMPECTOMY (Right Breast)    Relevant Problems   CARDIO   (+) Hypertension associated with diabetes (HCC)      GI/HEPATIC   (+) Colon cancer (HCC)   (+) Gastroesophageal reflux disease      /RENAL   (+) Stage 3a chronic kidney disease (HCC)      NEURO/PSYCH   (+) Generalized anxiety disorder      Other   (+) 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 (HCC)   (+) Ductal carcinoma in situ (DCIS) of right breast   (+) Memory difficulty        Physical Exam    Airway    Mallampati score: II  TM Distance: >3 FB  Neck ROM: full     Dental   upper dentures and lower dentures,     Cardiovascular  Cardiovascular exam normal    Pulmonary  Pulmonary exam normal     Other Findings  Portions of exam deferred due to low yield and/or unknown COVID status      Anesthesia Plan  ASA Score- 3     Anesthesia Type- general with ASA Monitors  Additional Monitors:   Airway Plan: LMA  Plan Factors-Exercise tolerance (METS): >4 METS  Chart reviewed  Existing labs reviewed  Patient summary reviewed  Patient is a current smoker  Induction- intravenous  Postoperative Plan-     Informed Consent- Anesthetic plan and risks discussed with patient  I personally reviewed this patient with the CRNA  Discussed and agreed on the Anesthesia Plan with the CRNA  Fabiaon Calderon

## 2022-04-19 NOTE — INTERVAL H&P NOTE
H&P reviewed  After examining the patient I find no changes in the patients condition since the H&P had been written      Vitals:    04/19/22 1213   BP: 170/70   Pulse: (!) 52   Resp: 16   Temp: 97 7 °F (36 5 °C)   SpO2: 98%

## 2022-04-19 NOTE — ANESTHESIA POSTPROCEDURE EVALUATION
Post-Op Assessment Note    CV Status:  Stable  Pain Score: 3    Pain management: adequate     Mental Status:  Alert and awake   Hydration Status:  Euvolemic   PONV Controlled:  Controlled   Airway Patency:  Patent      Post Op Vitals Reviewed: Yes      Staff: CRNA         No complications documented      BP   171/74   Temp   97 6   Pulse 78   Resp 18   SpO2 96% RA

## 2022-04-19 NOTE — DISCHARGE INSTRUCTIONS
Breast Lumpectomy   WHAT YOU NEED TO KNOW:   After a lumpectomy you may have swelling or bruising in your breast or the area where lymph nodes were removed  You may have pain or trouble moving your arm or shoulder that is closest to the lumpectomy  You may need to do arm stretches or exercises to improve your symptoms and prevent long term issues  DISCHARGE INSTRUCTIONS:   Call 911 for any of the following:   · You feel lightheaded, short of breath, and have chest pain  · You cough up blood  · You have trouble breathing  Seek care immediately if:   · Blood soaks through your bandage  · Your stitches come apart  · Your bruise suddenly gets bigger  · Your leg or arm is larger than normal and painful  Contact your healthcare provider if:   · You have a fever or chills  · Your wound is red, swollen, or draining pus  · You have nausea or are vomiting  · Your skin is itchy, swollen, or you have a rash  · Your pain does not get better after you take pain medicine  · Your drain falls out or stops draining fluid  · Your drain has pus or foul-smelling fluid coming out of it  · You have questions or concerns about your condition or care  Medicines: You may need any of the following:  · Antibiotics  help prevent a bacterial infection  · Prescription pain medicine  may be given  Ask your healthcare provider how to take this medicine safely  Some prescription pain medicines contain acetaminophen  Do not take other medicines that contain acetaminophen without talking to your healthcare provider  Too much acetaminophen may cause liver damage  Prescription pain medicine may cause constipation  Ask your healthcare provider how to prevent or treat constipation  · NSAIDs , such as ibuprofen, help decrease swelling, pain, and fever  NSAIDs can cause stomach bleeding or kidney problems in certain people   If you take blood thinner medicine, always ask your healthcare provider if NSAIDs are safe for you  Always read the medicine label and follow directions  · Take your medicine as directed  Contact your healthcare provider if you think your medicine is not helping or if you have side effects  Tell him or her if you are allergic to any medicine  Keep a list of the medicines, vitamins, and herbs you take  Include the amounts, and when and why you take them  Bring the list or the pill bottles to follow-up visits  Carry your medicine list with you in case of an emergency  Care for your wound as directed: If you have a tight-fitting bandage, you can remove it in 24 to 48 hours, or as directed  Ask your healthcare provider when your incision can get wet  You may need to take a sponge bath until your drain is removed  Carefully wash around the incision with soap and water  It is okay to allow the soap and water to gently run over your incision  Gently pat dry the area and put on new, clean bandages as directed  Change your bandages when they get wet or dirty  Check your incision every day for redness, pus, or swelling  Self-care:   · Apply ice  on your breast for 15 to 20 minutes every hour or as directed  Use an ice pack, or put crushed ice in a plastic bag  Cover it with a towel  Ice helps prevent tissue damage and decreases swelling and pain  · Rest  as directed  Do not lift anything heavy  Do not push or pull with your arms  Take short walks around the house  Gradually walk further as you feel better  Ask your healthcare provider when you can return to your normal activities  · Empty your drain  as directed  You may need to write down how much you empty from your drain each day  Ask your healthcare provider for more information about how to empty your drain  · Wear a supportive bra  as directed  Wait until you remove the tight-fitting bandage to wear a bra  You may be given a surgical bra or told to wear a sports bra  A supportive bra may help hold your bandages in place   It may also help with swelling and pain  Do not  wear bras with lace or underwire  They may rub against your incision and cause discomfort  Arm stretches: Your healthcare provider may show you how to do arm stretches  Arm stretches may prevent stiff arms or shoulders  You may need to wait until after your drains are removed to begin stretching  Do not do arm stretches until your healthcare provider says it is okay  Ask your healthcare provider for more information about arm stretches  Follow up with your doctor as directed:  Write down your questions so you remember to ask them during your visits  © Copyright [employer] 2022 Information is for End User's use only and may not be sold, redistributed or otherwise used for commercial purposes  All illustrations and images included in CareNotes® are the copyrighted property of [employer]  or Clarisa Foss  The above information is an  only  It is not intended as medical advice for individual conditions or treatments  Talk to your doctor, nurse or pharmacist before following any medical regimen to see if it is safe and effective for you

## 2022-04-19 NOTE — OP NOTE
OPERATIVE REPORT  PATIENT NAME: Donna Cruz    :  1950  MRN: [id number]  Pt Location: [facility] OR ROOM 02    SURGERY DATE: 2022    Surgeon(s) and Role:     * Dotty Mcqueen MD - Primary     * Sabrina Wilson PA-C - Assisting    Preop Diagnosis:  Ductal carcinoma in situ (DCIS) of right breast [D05 11]    Post-Op Diagnosis Codes:     * Ductal carcinoma in situ (DCIS) of right breast [D05 11]    Procedure(s) (LRB):  LUMPECTOMY (Right)    Specimen(s):  ID Type Source Tests Collected by Time Destination   1 : right breast lumpectomy marked per protocol  Tissue Breast, Right TISSUE EXAM Dotty Irby MD 2022 1506        Estimated Blood Loss:   Minimal    Drains:  * No LDAs found *    Anesthesia Type:   General/LMA    Operative Indications:  Ductal carcinoma in situ (DCIS) of right breast [D05 11]      Operative Findings:  Unremarkable  Complications:   None    Procedure and Technique: This is a 53-year-old female who had a right breast lumpectomy for atypical ductal hyperplasia  Her however pathology demonstrated ductal carcinoma in Situ with margins negative but close anteriorly and laterally  At this point this was discussed with the patient and were consented for right breast lumpectomy in the office  She was brought in today taken to the operating room where she was placed on the OR table supine position proper time-out prophylactic antibiotics were given general anesthesia was induced  Area was prepped and draped in sterile normal fashion  Previous surgical scar was marked in elliptical fashion  This was excised in an encompassing previous surgical cavity  Hemostasis was rechecked and achieved  This was properly inked with margin protocol  Hemostasis was rechecked and achieved wound was closed in layered fashion the skin was closed with running 4-0 Monocryl   Prineo dressing in place  I was told response which and instrument counts were correct x2     I was present for the entire procedure and A physician assistant was required during the procedure for retraction tissue handling,dissection and suturing    Patient Disposition:  PACU       SIGNATURE: Adalid Fitzgerald MD  DATE: April 19, 2022  TIME: 3:27 PM

## 2022-04-20 DIAGNOSIS — R25.1 TREMOR: Primary | ICD-10-CM

## 2022-04-20 RX ORDER — PRIMIDONE 50 MG/1
TABLET ORAL
Qty: 30 TABLET | Refills: 5 | Status: SHIPPED | OUTPATIENT
Start: 2022-04-20 | End: 2022-08-04

## 2022-05-09 ENCOUNTER — OFFICE VISIT (OUTPATIENT)
Dept: SURGICAL ONCOLOGY | Facility: CLINIC | Age: 72
End: 2022-05-09

## 2022-05-09 VITALS
HEIGHT: 62 IN | BODY MASS INDEX: 28.43 KG/M2 | WEIGHT: 154.5 LBS | OXYGEN SATURATION: 98 % | RESPIRATION RATE: 14 BRPM | SYSTOLIC BLOOD PRESSURE: 136 MMHG | HEART RATE: 82 BPM | DIASTOLIC BLOOD PRESSURE: 82 MMHG

## 2022-05-09 DIAGNOSIS — Z98.890 STATUS POST RIGHT BREAST LUMPECTOMY: ICD-10-CM

## 2022-05-09 DIAGNOSIS — Z71.89 OTHER SPECIFIED COUNSELING: ICD-10-CM

## 2022-05-09 DIAGNOSIS — D05.11 DUCTAL CARCINOMA IN SITU (DCIS) OF RIGHT BREAST: Primary | ICD-10-CM

## 2022-05-09 PROCEDURE — 99024 POSTOP FOLLOW-UP VISIT: CPT | Performed by: SURGERY

## 2022-05-09 NOTE — PROGRESS NOTES
Surgical Oncology Follow Up  [facility]  [facility] [place]  [address] [place] [address]    [facility]  1950  [id number]      Chief Complaint   Patient presents with    Post-op        Assessment & Plan:     Is here for follow-up status post right breast ductal carcinoma and lumpectomy she is overall doing well  Well-healed surgical site and no evidence of surgical site infection  Pathology was consistent copy of the report was given to the patient we will see her in 3 months  We will also refer her to Medical and Radiation oncologist   Cancer History:     Oncology History   Ductal carcinoma in situ (DCIS) of right breast   1/26/2022 Biopsy    Right breast stereotactic biopsy  1 o'clock  Breast tissue with focal atypical ductal hyperplasia, usual ductal hyperplasia, stromal fibrosis, and sclerosing adenosis with microcalcifications   Negative for in-situ or invasive carcinoma    Concordant  Biopsy clip is anterior to central aspect of architectural distortion  Kaila Bounds reflector was intentionally targeted posterior to biopsy clip, midway between the distortion and the biopsy clip  3/1/2022 Initial Diagnosis    Ductal carcinoma in situ (DCIS) of right breast     3/1/2022 Surgery    Right breast vivek  directed lumpectomy  Ductal carcinoma in situ  4 4 cm  Margins negative - distance from DCIS to anterior margin is less than 1 mm, distance from DCIS to lateral margin less than 2 mm    ER 90  DC 5     3/14/2022 Genetic Testing    [employer]  A total of 36 genes were evaluated, including: JOLANTA, BRCA1, BRCA2, CDH1, CHEK2, PALB2, PTEN, STK11, TP53  Negative result   No pathogenic sequence variants or deletions/duplications identified     3/14/2022 Genomic Testing    DCISionRT   Addendum   SCORE 3 9      4/19/2022 Surgery    Right breast lumpectomy  Residual focus of DCIS cribriform pattern  Low nuclear grade  Superior margin is negative for tumor by 6 0 mm   The remaining surgical margins are negative for tumor by wider range           Interval History: Up with right breast DCIS  Review of Systems:   Review of Systems   Constitutional: Negative for chills and fever  HENT: Negative for ear pain and sore throat  Eyes: Negative for pain and visual disturbance  Respiratory: Negative for cough and shortness of breath  Cardiovascular: Negative for chest pain and palpitations  Gastrointestinal: Negative for abdominal pain and vomiting  Genitourinary: Negative for dysuria and hematuria  Musculoskeletal: Negative for arthralgias and back pain  Skin: Negative for color change and rash  Neurological: Negative for seizures and syncope  All other systems reviewed and are negative        Past Medical History     Patient Active Problem List   Diagnosis    Tremor    Memory difficulty    Breast fibroadenoma in female, left    Atypical ductal hyperplasia of right breast    Ductal carcinoma in situ (DCIS) of right breast    Colon cancer ([facility] )    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 [facility]    Generalized anxiety disorder    [facility] with [facility]    Stage 3a chronic kidney disease ([facility] )    Gastroesophageal reflux disease     Past Medical History:   Diagnosis Date    Anxiety     [facility] ) 2015    chemo and surgery    Depression     Diabetes mellitus ([facility]     Ductal carcinoma in situ (DCIS) of right breast 3/10/2022    Hyperlipidemia     Hypertension     [facility] cancer [facility]melanoma) ([facility]      Past Surgical History:   Procedure Laterality Date    APPENDECTOMY      BACK SURGERY      BREAST BIOPSY Right 01/26/2022    right    BREAST LUMPECTOMY Right 3/1/2022    Procedure: RIGHT BREAST JOHN  DIRECTED LUMPECTOMY;  Surgeon: Faye Babin MD;  Location: [facility] OR;  Service: Surgical Oncology    BREAST LUMPECTOMY Right 4/19/2022    Procedure: LUMPECTOMY;  Surgeon: Leatha Coleman MD;  Location: [facility];  Service: Surgical Oncology    CHOLECYSTECTOMY      COLON SURGERY      COLONOSCOPY      HYSTERECTOMY  2000    MAMMO NEEDLE LOCALIZATION LEFT (ALL INC) Right 2/24/2022    MAMMO STEREOTACTIC BREAST BIOPSY RIGHT (ALL INC) Right 1/26/2022    US GUIDED BREAST BIOPSY LEFT COMPLETE Left 1/26/2022     Family History   Problem Relation Age of Onset    No Known Problems Mother     No Known Problems Father     No Known Problems Sister     Lung cancer Brother     No Known Problems Maternal Grandmother     No Known Problems Maternal Grandfather     No Known Problems Paternal Grandmother     No Known Problems Paternal Grandfather     No Known Problems Daughter     No Known Problems Maternal Aunt     No Known Problems Paternal Aunt     Other Paternal Aunt         oral cancer    Breast cancer Paternal Aunt         age unknown   [employer] Breast cancer Paternal Aunt         age unknown    Breast cancer Cousin         age unknown    Stomach cancer Paternal Uncle     No Known Problems Son     No Known Problems Son      Social History     Socioeconomic History    Marital status:      Spouse name: Not on file    Number of children: Not on file    Years of education: Not on file    Highest education level: Not on file   Occupational History    Not on file   Tobacco Use    Smoking status: Current Every Day Smoker     Packs/day: 1 50     Years: 55 00     Pack years: 82 50    Smokeless tobacco: Never Used   Vaping Use    Vaping Use: Never used   Substance and Sexual Activity    Alcohol use: Never    Drug use: Never    Sexual activity: Not Currently   Other Topics Concern    Not on file   Social History Narrative    Not on file     Social Determinants of Health     Financial Resource Strain: Not on file   Food Insecurity: Not on file   Transportation Needs: Not on file   Physical Activity: Not on file   Stress: Not on file   Social Connections: Not on file   Intimate Partner Violence: Not on file   Housing Stability: Not on file       Current Outpatient Medications:     acetaminophen (TYLENOL) 650 mg CR tablet, Take 650 mg by mouth as needed for mild pain, Disp: , Rfl:     amLODIPine (NORVASC) 2 5 mg tablet, Take 2 5 mg by mouth daily  , Disp: , Rfl:     atorvastatin (LIPITOR) 20 mg tablet, Take 20 mg by mouth daily, Disp: , Rfl:     donepezil (ARICEPT) 10 mg tablet, Take 1 tablet (10 mg total) by mouth daily at bedtime, Disp: 30 tablet, Rfl: 5    fenofibrate (TRICOR) 145 mg tablet, Take 145 mg by mouth daily, Disp: , Rfl:     gabapentin (NEURONTIN) 600 MG tablet, Take 300 mg by mouth 2 (two) times a day , Disp: , Rfl:     glimepiride (AMARYL) 4 mg tablet, Take 4 mg by mouth daily with breakfast, Disp: , Rfl:     hydrochlorothiazide (HYDRODIURIL) 12 5 mg tablet, Take 12 5 mg by mouth daily, Disp: , Rfl:     HYDROcodone-acetaminophen (Norco) 5-325 mg per tablet, Take 1 tablet by mouth every 6 (six) hours as needed for pain, Disp: 10 tablet, Rfl: 0    irbesartan (AVAPRO) 300 mg tablet, Take 300 mg by mouth daily, Disp: , Rfl:     magnesium oxide (MAG-OX) 400 mg tablet, Take 400 mg by mouth daily, Disp: , Rfl:     Melatonin 10 MG TABS, Take 20 mg by mouth daily at bedtime as needed, Disp: , Rfl:     metFORMIN (GLUCOPHAGE) 1000 MG tablet, Take 1,000 mg by mouth 2 (two) times a day with meals  , Disp: , Rfl:     metoprolol succinate (TOPROL-XL) 50 mg 24 hr tablet, Take 75 mg by mouth daily, Disp: , Rfl:     omeprazole (PriLOSEC) 40 MG capsule, Take 40 mg by mouth daily, Disp: , Rfl:     oxybutynin (DITROPAN-XL) 5 mg 24 hr tablet, Take 5 mg by mouth daily, Disp: , Rfl:     oxyCODONE-acetaminophen (Percocet) 5-325 mg per tablet, Take 1 tablet by mouth every 6 (six) hours as needed for moderate pain Max Daily Amount: 4 tablets, Disp: 20 tablet, Rfl: 0    primidone (MYSOLINE) 50 mg tablet, Half a tablet at bedtime for 2 weeks and if tolerating it well and can increase it to 1 pill at night time, Disp: 30 tablet, Rfl: 5    sitaGLIPtin (JANUVIA) 100 mg tablet, Take 100 mg by mouth daily, Disp: , Rfl:   Allergies   Allergen Reactions    Codeine GI Intolerance    Morphine GI Intolerance       Physical Exam:     Vitals:    05/09/22 0947   BP: 136/82   Pulse: 82   Resp: 14   SpO2: 98%     Physical Exam  Constitutional:       Appearance: Normal appearance  HENT:      Head: Normocephalic and atraumatic  Nose: Nose normal       Mouth/Throat:      Mouth: Mucous membranes are moist    Eyes:      Pupils: Pupils are equal, round, and reactive to light  Cardiovascular:      Rate and Rhythm: Normal rate  Pulses: Normal pulses  Heart sounds: Normal heart sounds  Pulmonary:      Effort: Pulmonary effort is normal       Breath sounds: Normal breath sounds  Chest:          Comments: Well-healed surgical site  Abdominal:      General: Bowel sounds are normal       Palpations: Abdomen is soft  Musculoskeletal:         General: Normal range of motion  Cervical back: Normal range of motion and neck supple  Skin:     General: Skin is warm  Neurological:      General: No focal deficit present  Mental Status: She is alert and oriented to person, place, and time  Psychiatric:         Mood and Affect: Mood normal          Behavior: Behavior normal          Thought Content: Thought content normal          Judgment: Judgment normal            Results & Discussion:   I did reviewshe under in detail  We will refer to Medical and Radiation oncologist   I will see her in 3 months  stands and  agrees   All patient questions were answered  Advance Care Planning/Advance Directives: Jody Schaumann, MD discussed the disease status with Wilma Smith  today 05/09/22  treatment plans and follow-up with the patient

## 2022-05-10 ENCOUNTER — TELEPHONE (OUTPATIENT)
Dept: SURGICAL ONCOLOGY | Facility: CLINIC | Age: 72
End: 2022-05-10

## 2022-05-10 ENCOUNTER — TELEPHONE (OUTPATIENT)
Dept: HEMATOLOGY ONCOLOGY | Facility: CLINIC | Age: 72
End: 2022-05-10

## 2022-05-10 NOTE — TELEPHONE ENCOUNTER
Patient's son Chu Cruz) called in and left a voicemail yesterday after hours  Please route and give a call back

## 2022-05-11 ENCOUNTER — TELEPHONE (OUTPATIENT)
Dept: OTHER | Facility: HOSPITAL | Age: 72
End: 2022-05-11

## 2022-05-11 NOTE — TELEPHONE ENCOUNTER
I left a vm for Miriam Stout asking him to call to make an appt for Kika Hyde to see Medical Oncology as they have left vm's as well  I stated I will be calling him to make the Rad Onc appt once I see the Med Onc appt has been made

## 2022-05-13 ENCOUNTER — TELEPHONE (OUTPATIENT)
Dept: HEMATOLOGY ONCOLOGY | Facility: CLINIC | Age: 72
End: 2022-05-13

## 2022-05-13 NOTE — TELEPHONE ENCOUNTER
Intra-Department Referral    Patient Details:  Easton Serrano  1950     Background Information:  [address] starts by opening a telephone encounter and gathering the following information   What department is patient being referred to? Medical Oncology    Who is calling to schedule and relationship?    Child   Diagnosis Ductal carcinoma in situ (DCIS) of right breast   What department was patient seen in last?       Surg Onc     Dr Ledezma          Miscellaneous:

## 2022-06-10 ENCOUNTER — TELEPHONE (OUTPATIENT)
Dept: HEMATOLOGY ONCOLOGY | Facility: CLINIC | Age: 72
End: 2022-06-10

## 2022-06-10 NOTE — TELEPHONE ENCOUNTER
Patient's son left a message regarding information on upcoming appt  LVM for Marisol Hams to call back  Direct teams line provided

## 2022-06-10 NOTE — TELEPHONE ENCOUNTER
Appointment Confirmation (to confirm pre existing appointments - ONLY)     Appointment with Dr Froylan Lopez   Appointment date & time 6/15 @ 940   Location [place]   Patient verbilized Understanding yes

## 2022-06-10 NOTE — TELEPHONE ENCOUNTER
Keny Decker called back, just wanted to confirm the appt  Verified date/time  He voiced understanding

## 2022-06-15 ENCOUNTER — CONSULT (OUTPATIENT)
Dept: HEMATOLOGY ONCOLOGY | Facility: CLINIC | Age: 72
End: 2022-06-15
Payer: COMMERCIAL

## 2022-06-15 VITALS
DIASTOLIC BLOOD PRESSURE: 62 MMHG | BODY MASS INDEX: 28.71 KG/M2 | SYSTOLIC BLOOD PRESSURE: 132 MMHG | HEIGHT: 62 IN | WEIGHT: 156 LBS | TEMPERATURE: 98 F | HEART RATE: 56 BPM | OXYGEN SATURATION: 97 % | RESPIRATION RATE: 16 BRPM

## 2022-06-15 DIAGNOSIS — F17.200 CURRENT EVERY DAY SMOKER: ICD-10-CM

## 2022-06-15 DIAGNOSIS — Z85.038 HISTORY OF COLON CANCER, STAGE III: ICD-10-CM

## 2022-06-15 DIAGNOSIS — Z78.0 POST-MENOPAUSAL: ICD-10-CM

## 2022-06-15 DIAGNOSIS — D05.11 DUCTAL CARCINOMA IN SITU (DCIS) OF RIGHT BREAST: Primary | ICD-10-CM

## 2022-06-15 PROCEDURE — 1123F ACP DISCUSS/DSCN MKR DOCD: CPT | Performed by: INTERNAL MEDICINE

## 2022-06-15 PROCEDURE — 99205 OFFICE O/P NEW HI 60 MIN: CPT | Performed by: INTERNAL MEDICINE

## 2022-06-15 RX ORDER — LETROZOLE 2.5 MG/1
2.5 TABLET, FILM COATED ORAL DAILY
Qty: 30 TABLET | Refills: 0 | Status: SHIPPED | OUTPATIENT
Start: 2022-06-15 | End: 2022-07-06

## 2022-06-15 NOTE — PROGRESS NOTES
[facility] - Hematology & Medical Oncology  Outpatient Visit Encounter Note    Maddie Baxter 73 y o  female [id number] Date:  6/15/2022    ONCOLOGY HISTORY        Oncology History Overview Note   Who presented because she could feel a mass in her right breast at 1:00  She underwent biopsy and was referred to Dr Cait Joiner  for focal atypical ductal hyperplasia of th right breast  Dr Cait Joiner performed right breast vivek  directed lumpectomy on 3/1/22 and pathology showed ductal carcinoma in situ  She underwent a second right breast lumpectomy on 4/19/22 since there were close margins on both the anterior and lateral aspect of the specimen  She is being referred by Dr Cait Joiner and presents today for consult  10/29/21 B/L Screening Mammogram  RIGHT  1) ARCHITECTURAL DISTORTION: There is architectural distortion seen in the upper inner quadrant of the right breast in the middle depth  Left  There are no suspicious masses, grouped microcalcifications or areas of unexplained architectural distortion  The skin and nipple areolar   RECOMMENDATION:       - Diagnostic mammogram at the current time for the right breast        - Ultrasound at the current time for the right breast        - Routine screening mammogram in 1 year for the left breast     11/16/21 right diagnostic mammogram  RIGHT  A) ARCHITECTURAL DISTORTION  Mammo diagnostic right w 3d & cad: There is architectural distortion seen in the upper inner quadrant of the right breast in the middle depth  The area in question from the prior screening mammogram is questionably identified in today's 90 degree lateral view, upper breast approximately 1 o'clock position  Targeted ultrasound throughout this region does not identify any definitive sonographic correlate  The mammographic finding remains inconclusive    Recommend consideration for contrast-enhanced breast MRI to further assess this finding, otherwise would do six-month follow-up mammogram    RECOMMENDATION:       - MRI for both breasts  12/30/21 MRI B/L breasts w wo contrast  LEFT  C) MASS: There is an 8 mm oval, homogeneous mass seen in the lower outer quadrant of the left breast in the middle depth, which is hyperintense on T2 weighted images  RIGHT  B) NON-MASS ENHANCEMENT: There is subtle non-mass enhancement seen in the upper inner quadrant of the right breast in the middle depth  This is within the area of architectural distortion described on the prior mammograms  This measures approximately 4 cm AP x 2 7 cm craniocaudal by 2 6 cm transverse  See series 93970, Image   This does increase over time (see series 504, image 85)  Lymph nodes: There is no axillary or internal mammary lymphadenopathy  RECOMMENDATION:       - Ultrasound at the current time for the left breast        - Biopsy- US guided for the left breast        - Biopsy- Stereo guided for the right breast     1/26/22 US guided biopsy left and Mammo stereotatic right breast biopsy  LEFT  C) MASS  US guided breast biopsy left complete: Images of the left breast mass in the lower outer quadrant at 4 o'clock in the middle portion, 2 cm from the nipple were obtained  A core needle biopsy was performed under ultrasound guidance using a lateral approach  The skin was prepped in the usual fashion  Anesthesia was administered using lidocaine 1%  A 16 G marquee needle was used to obtain 4 core samples  A cylinder clip was inserted into the target area  Post-placement digital mammographic imaging demonstrates the clip was at the site  The patient tolerated the procedure well  There were no immediate complications  RIGHT  A) ARCHITECTURAL DISTORTION  Mammo stereotactic breast biopsy right (all inc): Images of the right breast architectural distortion at 1 o'clock in the middle portion were obtained   A core needle biopsy was performed under tomosynthesis guidance using a superior approach  The skin was prepped in the usual fashion  Anesthesia was administered using lidocaine 1% for superficial anesthesia and lidocaine 2% with epinephrine for deeper anesthesia  An 8 G device was advanced  Pre and postfire imaging reveal satisfactory positioning of the needle  6 core samples were obtained circumferentially  Specimen radiograph shows expected soft tissue within the core samples  A triple twist clip was inserted into the target area  Post-placement digital mammographic imaging demonstrates the triple twist clip is at the anterior aspect of the distortion  The patient tolerated the procedure well  There were no immediate complications  RECOMMENDATION:       - Return to Routine Screening for the left breast        - Surgical Consultation for the right breast       22 Consult with Dr Viridiana Cueva  Left breast biopsy is consistent with fibroadenoma  surgery for excision of right breast focal ductal atypical hyperplasia  understand this is not cancer operation  Based on final pathology she may need additional procedures based on final pathology  3/1/22 Right Breast Lia  lumpectomy    3/14/22 Dr Viridiana Cueva  final pathology is extensive ductal carcinoma in Situ the area of expanding over 44 mm  Margins negative  Given the strong family history and personal history of colon cancer, will obtain genetic testing prior to surgical decision making  3/30/22 Dr Viridiana Cueva  decision RT from the original specimen and read as high risk  May re-send from re-excision   Recommend right breast lumpectomy for ductal newly diagnosed ductal carcinoma in situ with wider margins    22 right breast lumpectomy    22 Dr Viridiana Cueva  Well-healed surgical site and no evidence of surgical site infection     Pathology reviewed  Refer to Med Onc and Rad Onc  Follow up 3 months    6/15/22 Dr Bbo Castillo      Upcomin/10/22 Surgical Oncology     Ductal carcinoma in situ (DCIS) of right breast 1/26/2022 Biopsy    Right breast stereotactic biopsy  1 o'clock  Breast tissue with focal atypical ductal hyperplasia, usual ductal hyperplasia, stromal fibrosis, and sclerosing adenosis with microcalcifications   Negative for in-situ or invasive carcinoma    Concordant  Biopsy clip is anterior to central aspect of architectural distortion  Clay Rebel reflector was intentionally targeted posterior to biopsy clip, midway between the distortion and the biopsy clip  3/1/2022 Initial Diagnosis    Ductal carcinoma in situ (DCIS) of right breast     3/1/2022 Surgery    Right breast vivek  directed lumpectomy  Ductal carcinoma in situ  4 4 cm  Margins negative - distance from DCIS to anterior margin is less than 1 mm, distance from DCIS to lateral margin less than 2 mm    ER 90  NE 5     3/14/2022 Genetic Testing    [employer]  A total of 36 genes were evaluated, including: JOLANTA, BRCA1, BRCA2, CDH1, CHEK2, PALB2, PTEN, STK11, TP53  Negative result  No pathogenic sequence variants or deletions/duplications identified     3/14/2022 Genomic Testing    DCISionRT   Addendum   SCORE 3 9      4/19/2022 Surgery    Right breast lumpectomy  Residual focus of DCIS cribriform pattern  Low nuclear grade  Superior margin is negative for tumor by 6 0 mm   The remaining surgical margins are negative for tumor by wider range           SUBJECTIVE      Carloa Distance is a 73 y o  here for new consultation with me today  The patient is referred by Dr Mario Garcia and the reason for consultation is DCIS  In review of the chart and talking with the patient, as detailed above, after self palpation of a breast mass on the right side she underwent imaging that showed an 8 mm oval homogeneous mass in her left breast with a mass also seen on the right breast   She thereafter underwent biopsy of both her left and her right breast lesions as seen on imaging    Her left breast biopsy showed fibroadenoma and thereafter surgery was done for excision of the right breast that showed focal ductal atypical hyperplasia  Her final pathology report showed extensive DCIS with negative margins  She thereafter underwent another lumpectomy for wider margins  Here with her son  Declined breast exam  Denies any f/c/n/v/cp/ap/sob/cough  Denies diarrhea or skin rash  Denies known osteoporosis  Is a smoker a 1ppd  She has history of stage 3 colon cancer in 2015 at [facility]  She got surgery and chemotherapy after that for 6 months  She tells me that she has been cancer free from that since then  I have reviewed the relevant past medical, surgical, social and family history  I have also reviewed allergies and medications for this patient  Review of Systems  Review of Systems   All other systems reviewed and are negative  OBJECTIVE     Physical Exam  Vitals:    06/15/22 0945   BP: 132/62   BP Location: Left arm   Patient Position: Sitting   Cuff Size: Adult   Pulse: 56   Resp: 16   Temp: 98 °F (36 7 °C)   SpO2: 97%   Weight: 70 8 kg (156 lb)   Height: 5' 2" (1 575 m)       Physical Exam  Vitals reviewed  Constitutional:       General: She is not in acute distress  Appearance: She is not ill-appearing, toxic-appearing or diaphoretic  HENT:      Head: Normocephalic and atraumatic  Eyes:      General: No scleral icterus  Extraocular Movements: Extraocular movements intact  Cardiovascular:      Rate and Rhythm: Normal rate  Pulmonary:      Effort: Pulmonary effort is normal  No respiratory distress  Chest:      Comments: Declined breast exam  Abdominal:      General: There is no distension  Musculoskeletal:         General: Normal range of motion  Cervical back: Normal range of motion and neck supple  Neurological:      General: No focal deficit present  Mental Status: She is alert and oriented to person, place, and time        Gait: Gait normal           Imaging  Relevant imaging reviewed in chart    Labs  Relevant labs reviewed in chart   ASSESSMENT & PLAN      Diagnosis ICD-10-CM Associated Orders   1  Ductal carcinoma in situ (DCIS) of right breast  D05 11 Ambulatory referral to Hematology / Oncology     DXA bone density spine hip and pelvis     letrozole (FEMARA) 2 5 mg tablet     Hepatic function panel   2  Current every day smoker  F17 200    3  Post-menopausal  Z78 0 DXA bone density spine hip and pelvis     letrozole (FEMARA) 2 5 mg tablet     Hepatic function panel   4  History of colon cancer, stage III  Z80 26        73 y o  postmenopausal female diagnosed with right breast DCIS with surgical and breast specific abnormal pathology history as detailed above  She has a history of Stage 3 colon cancer in 2015 managed in [place] with surgery and adjuvant chemotherapy   Oncology history updated accordingly this visit   Goals of care/patient communication  o I discussed with her the clinical course leading up to their cancer diagnosis  I reviewed relevant office notes, imaging reports and pathology result as well   o In accordance with national guidelines, I recommend adjuvant endocrine therapy for at least 5 years  In review of the available options for such management, I recommend daily use of letrozole 2 5 mg  I reviewed the package insert along side the listed adverse effects  She understands that the benefits are higher than the risks and hence this is being recommended   o I have sent a 30 day supply medication with recommendation for her to follow up with me in 3-4 weeks for assessment and based on that further supplied planning will be made   TNM/Staging At Diagnosis  o pTis  o Stage 0   Disease Features/Tumor Markers/Genetics  o Notable Path Features: ER+NC+   Treatment  o Status post lumpectomy recommendation for adjuvant endocrine therapy for at least 5 years     Labs/Diagnostics  o Hepatic function panel upon return Visit  o DEXA scan for baseline bone mass assessment  o Recommend q1y lipid panel with PCP  o Recommend q1y mammogram with surgeon      Follow Up   3-4 weeks      All questions were answered to the patient's satisfaction during this encounter  They appreciated and thanked me for spending time with them  The patient knows the contact information for our office and know to reach out for any relevant concerns related to this encounter  For all other listed problems and medical diagnosis in his chart - they are managed by PCP and/or other specialists, which patient acknowledges  Mindy Howell MD  Hematology & Medical Oncology

## 2022-06-21 ENCOUNTER — CLINICAL SUPPORT (OUTPATIENT)
Dept: RADIATION ONCOLOGY | Facility: CLINIC | Age: 72
End: 2022-06-21
Attending: RADIOLOGY
Payer: COMMERCIAL

## 2022-06-21 VITALS
WEIGHT: 154 LBS | BODY MASS INDEX: 28.17 KG/M2 | HEART RATE: 71 BPM | SYSTOLIC BLOOD PRESSURE: 144 MMHG | RESPIRATION RATE: 16 BRPM | TEMPERATURE: 97.8 F | OXYGEN SATURATION: 95 % | DIASTOLIC BLOOD PRESSURE: 76 MMHG

## 2022-06-21 DIAGNOSIS — D05.11 DUCTAL CARCINOMA IN SITU (DCIS) OF RIGHT BREAST: Primary | ICD-10-CM

## 2022-06-21 DIAGNOSIS — D05.11 DUCTAL CARCINOMA IN SITU (DCIS) OF RIGHT BREAST: ICD-10-CM

## 2022-06-21 PROCEDURE — 99024 POSTOP FOLLOW-UP VISIT: CPT | Performed by: RADIOLOGY

## 2022-06-21 PROCEDURE — 77263 THER RADIOLOGY TX PLNG CPLX: CPT | Performed by: RADIOLOGY

## 2022-06-21 PROCEDURE — 99211 OFF/OP EST MAY X REQ PHY/QHP: CPT | Performed by: RADIOLOGY

## 2022-06-21 NOTE — PROGRESS NOTES
Floridalma Qiu 1950 is a 73 y o  female who presented because she could feel a mass in her right breast at 1:00  She underwent biopsy and was referred to Dr Ambrosio Ignacio  for focal atypical ductal hyperplasia of the right breast  Dr Ambrosio Ignacio performed right breast vivek  directed lumpectomy on 3/1/22 and pathology showed ductal carcinoma in situ  She underwent a second right breast lumpectomy on 4/19/22 since there were close margins on both the anterior and lateral aspect of the specimen  She is being referred by Dr Ambrosio Ignacio and presents today for consult  10/29/21 B/L Screening Mammogram  RIGHT  1) ARCHITECTURAL DISTORTION: There is architectural distortion seen in the upper inner quadrant of the right breast in the middle depth  Left  There are no suspicious masses, grouped microcalcifications or areas of unexplained architectural distortion  The skin and nipple areolar   RECOMMENDATION:       - Diagnostic mammogram at the current time for the right breast        - Ultrasound at the current time for the right breast        - Routine screening mammogram in 1 year for the left breast     11/16/21 right diagnostic mammogram  RIGHT  A) ARCHITECTURAL DISTORTION  Mammo diagnostic right w 3d & cad: There is architectural distortion seen in the upper inner quadrant of the right breast in the middle depth  The area in question from the prior screening mammogram is questionably identified in today's 90 degree lateral view, upper breast approximately 1 o'clock position  Targeted ultrasound throughout this region does not identify any definitive sonographic correlate  The mammographic finding remains inconclusive  Recommend consideration for contrast-enhanced breast MRI to further assess this finding, otherwise would do six-month follow-up mammogram    RECOMMENDATION:       - MRI for both breasts      12/30/21 MRI B/L breasts w wo contrast  LEFT  C) MASS: There is an 8 mm oval, homogeneous mass seen in the lower outer quadrant of the left breast in the middle depth, which is hyperintense on T2 weighted images  RIGHT  B) NON-MASS ENHANCEMENT: There is subtle non-mass enhancement seen in the upper inner quadrant of the right breast in the middle depth  This is within the area of architectural distortion described on the prior mammograms  This measures approximately 4 cm AP x 2 7 cm craniocaudal by 2 6 cm transverse  See series 37798, Image   This does increase over time (see series 504, image 85)  Lymph nodes: There is no axillary or internal mammary lymphadenopathy  RECOMMENDATION:       - Ultrasound at the current time for the left breast        - Biopsy- US guided for the left breast        - Biopsy- Stereo guided for the right breast     1/26/22 US guided biopsy left and Mammo stereotatic right breast biopsy  LEFT  C) MASS  US guided breast biopsy left complete: Images of the left breast mass in the lower outer quadrant at 4 o'clock in the middle portion, 2 cm from the nipple were obtained  A core needle biopsy was performed under ultrasound guidance using a lateral approach  The skin was prepped in the usual fashion  Anesthesia was administered using lidocaine 1%  A 16 G marquee needle was used to obtain 4 core samples  A cylinder clip was inserted into the target area  Post-placement digital mammographic imaging demonstrates the clip was at the site  The patient tolerated the procedure well  There were no immediate complications  RIGHT  A) ARCHITECTURAL DISTORTION  Mammo stereotactic breast biopsy right (all inc): Images of the right breast architectural distortion at 1 o'clock in the middle portion were obtained  A core needle biopsy was performed under tomosynthesis guidance using a superior approach  The skin was prepped in the usual fashion  Anesthesia was administered using lidocaine 1% for superficial anesthesia and lidocaine 2% with epinephrine for deeper anesthesia   An 8 G device was advanced  Pre and postfire imaging reveal satisfactory positioning of the needle  6 core samples were obtained circumferentially  Specimen radiograph shows expected soft tissue within the core samples  A triple twist clip was inserted into the target area  Post-placement digital mammographic imaging demonstrates the triple twist clip is at the anterior aspect of the distortion  The patient tolerated the procedure well  There were no immediate complications  RECOMMENDATION:       - Return to Routine Screening for the left breast        - Surgical Consultation for the right breast       22 Consult with Dr Jorden Alston  Left breast biopsy is consistent with fibroadenoma  surgery for excision of right breast focal ductal atypical hyperplasia  understand this is not cancer operation  Based on final pathology she may need additional procedures based on final pathology  3/1/22 Right Breast Lia  lumpectomy    3/14/22 Dr Jorden Alston  final pathology is extensive ductal carcinoma in Situ the area of expanding over 44 mm  Margins negative  Given the strong family history and personal history of colon cancer, will obtain genetic testing prior to surgical decision making  3/30/22 Dr Jorden Alston  decision RT from the original specimen and read as high risk  May re-send from re-excision   Recommend right breast lumpectomy for ductal newly diagnosed ductal carcinoma in situ with wider margins    22 right breast lumpectomy    22 Dr Jorden Alston  Well-healed surgical site and no evidence of surgical site infection     Pathology reviewed  Refer to Med Onc and Rad Onc  Follow up 3 months    6/15/22 Dr Mary Howell  Start letrozole    Upcomin/10/22 Surgical Oncology      Oncology History   Ductal carcinoma in situ (DCIS) of right breast   2022 Biopsy    Right breast stereotactic biopsy  1 o'clock  Breast tissue with focal atypical ductal hyperplasia, usual ductal hyperplasia, stromal fibrosis, and sclerosing adenosis with microcalcifications   Negative for in-situ or invasive carcinoma    Concordant  Biopsy clip is anterior to central aspect of architectural distortion  Charyl Punter reflector was intentionally targeted posterior to biopsy clip, midway between the distortion and the biopsy clip  3/1/2022 Initial Diagnosis    Ductal carcinoma in situ (DCIS) of right breast     3/1/2022 Surgery    Right breast vivek  directed lumpectomy  Ductal carcinoma in situ  4 4 cm  Margins negative - distance from DCIS to anterior margin is less than 1 mm, distance from DCIS to lateral margin less than 2 mm    ER 90  [place] 5     3/14/2022 Genetic Testing    [facility]  A total of 36 genes were evaluated, including: JOLANTA, BRCA1, BRCA2, CDH1, CHEK2, PALB2, PTEN, STK11, TP53  Negative result  No pathogenic sequence variants or deletions/duplications identified     3/14/2022 Genomic Testing    DCISionRT   Addendum   SCORE 3 9      4/19/2022 Surgery    Right breast lumpectomy  Residual focus of DCIS cribriform pattern  Low nuclear grade  Superior margin is negative for tumor by 6 0 mm   The remaining surgical margins are negative for tumor by wider range         Review of Systems:  Review of Systems   Constitutional: Positive for fatigue  HENT: Positive for congestion and dental problem (full dentures)  Eyes: Negative  Respiratory: Positive for shortness of breath  Cardiovascular: Negative  Gastrointestinal: Negative  Genitourinary: Negative  Musculoskeletal: Positive for neck stiffness  Skin:        Dry skin   Allergic/Immunologic: Negative  Neurological: Positive for tremors, light-headedness and numbness (toes)  Hematological: Bruises/bleeds easily  Psychiatric/Behavioral: Negative  Clinical Trial: no    OB/GYN History:  The patient underwent menarche at 17 years  Menopause Status Pre, Lynn, Unknown and No Answer  No LMP recorded (lmp unknown)  Patient has had a hysterectomy    Menopause at 51} years  Menopause Reason hysterectomy  Hormone replacement therapy: yes  Years used 2 years  [de-identified] 2   Para 3   Age at first delivery being 23 years     Nursing: no    Birth control pills: no       Pregnancy test needed:  no    ONCOTYPE/MAMMOPRINT results: DCISionRT 3 9    PFT N/A    Prior Radiation no    Teaching  NIH book, SIM, side effects    MST yes    Implantable Devices (Port, Pacemaker, pain stimulator) no    Hip Replacement no    Covid Vaccine Status yes, no booster     [unfilled]  Health Maintenance   Topic Date Due    Medicare Annual Wellness Visit (AWV)  Never done    Diabetic Foot Exam  Never done    DM Eye Exam  Never done    Depression Screening  Never done    BMI: Followup Plan  Never done    DTaP,Tdap,and Td Vaccines (1 - Tdap) Never done    Osteoporosis Screening  Never done    Lung Cancer Screening  Never done    Fall Risk  Never done    COVID-19 Vaccine (3 - Booster for Moderna series) 11/01/2021    Pneumococcal Vaccine: 65+ Years (2 - PPSV23 or PCV20) 09/13/2022    HEMOGLOBIN A1C  09/30/2022    Breast Cancer Screening: Mammogram  11/16/2022    BMI: Adult  06/15/2023    Hepatitis C Screening  Completed    Influenza Vaccine  Completed    HIB Vaccine  Aged Out    Hepatitis B Vaccine  Aged Out    IPV Vaccine  Aged Out    Hepatitis A Vaccine  Aged Out    Meningococcal ACWY Vaccine  Aged Out    HPV Vaccine  Aged Out     Past Medical History:   Diagnosis Date    Anxiety     Colon cancer ([facility] [address] ) 2015    chemo and surgery    Depression     Diabetes mellitus ([facility] [address] )     Ductal carcinoma in situ (DCIS) of right breast 3/10/2022    Hyperlipidemia     Hypertension     Skin cancer (melanoma) ([facility] [address] )      Past Surgical History:   Procedure Laterality Date    APPENDECTOMY      BACK SURGERY      BREAST BIOPSY Right 01/26/2022    right    BREAST LUMPECTOMY Right 3/1/2022    Procedure: RIGHT BREAST JOHN  DIRECTED LUMPECTOMY;  Surgeon: Anthony Johnson MD; Location: [facility];  Service: Surgical Oncology    BREAST LUMPECTOMY Right 4/19/2022    Procedure: LUMPECTOMY;  Surgeon: Rosa Shah MD;  Location: [facility];  Service: Surgical Oncology    CHOLECYSTECTOMY      COLON SURGERY      COLONOSCOPY      HYSTERECTOMY  2000    MAMMO NEEDLE LOCALIZATION LEFT (ALL INC) Right 2/24/2022    MAMMO STEREOTACTIC BREAST BIOPSY RIGHT (ALL INC) Right 1/26/2022    US GUIDED BREAST BIOPSY LEFT COMPLETE Left 1/26/2022     Family History   Problem Relation Age of Onset    No Known Problems Mother     No Known Problems Father     No Known Problems Sister     Lung cancer Brother     No Known Problems Maternal Grandmother     No Known Problems Maternal Grandfather     No Known Problems Paternal Grandmother     No Known Problems Paternal Grandfather     No Known Problems Daughter     No Known Problems Maternal Aunt     No Known Problems Paternal Aunt     Other Paternal Aunt         oral cancer    Breast cancer Paternal Aunt         age unknown   [facility] Breast cancer Paternal Aunt         age unknown    Breast cancer Cousin         age unknown    Stomach cancer Paternal Uncle     No Known Problems Son     No Known Problems Son      Social History     Tobacco Use    Smoking status: Current Every Day Smoker     Packs/day: 1 50     Years: 55 00     Pack years: 82 50    Smokeless tobacco: Never Used   Vaping Use    Vaping Use: Never used   Substance Use Topics    Alcohol use: Never    Drug use: Never        Current Outpatient Medications:     acetaminophen (TYLENOL) 650 mg CR tablet, Take 650 mg by mouth as needed for mild pain, Disp: , Rfl:     amLODIPine (NORVASC) 2 5 mg tablet, Take 2 5 mg by mouth daily  , Disp: , Rfl:     atorvastatin (LIPITOR) 20 mg tablet, Take 20 mg by mouth daily, Disp: , Rfl:     donepezil (ARICEPT) 10 mg tablet, Take 1 tablet (10 mg total) by mouth daily at bedtime, Disp: 30 tablet, Rfl: 5    fenofibrate (TRICOR) 145 mg tablet, Take 145 mg by mouth daily, Disp: , Rfl:     gabapentin (NEURONTIN) 600 MG tablet, Take 300 mg by mouth 2 (two) times a day , Disp: , Rfl:     glimepiride (AMARYL) 4 mg tablet, Take 4 mg by mouth daily with breakfast, Disp: , Rfl:     hydrochlorothiazide (HYDRODIURIL) 12 5 mg tablet, Take 12 5 mg by mouth daily, Disp: , Rfl:     HYDROcodone-acetaminophen (Norco) 5-325 mg per tablet, Take 1 tablet by mouth every 6 (six) hours as needed for pain, Disp: 10 tablet, Rfl: 0    irbesartan (AVAPRO) 300 mg tablet, Take 300 mg by mouth daily, Disp: , Rfl:     letrozole (FEMARA) 2 5 mg tablet, Take 1 tablet (2 5 mg total) by mouth daily, Disp: 30 tablet, Rfl: 0    magnesium oxide (MAG-OX) 400 mg tablet, Take 400 mg by mouth daily, Disp: , Rfl:     Melatonin 10 MG TABS, Take 20 mg by mouth daily at bedtime as needed, Disp: , Rfl:     metFORMIN (GLUCOPHAGE) 1000 MG tablet, Take 1,000 mg by mouth 2 (two) times a day with meals  , Disp: , Rfl:     metoprolol succinate (TOPROL-XL) 50 mg 24 hr tablet, Take 75 mg by mouth daily, Disp: , Rfl:     omeprazole (PriLOSEC) 40 MG capsule, Take 40 mg by mouth daily, Disp: , Rfl:     oxybutynin (DITROPAN-XL) 5 mg 24 hr tablet, Take 5 mg by mouth daily, Disp: , Rfl:     oxyCODONE-acetaminophen (Percocet) 5-325 mg per tablet, Take 1 tablet by mouth every 6 (six) hours as needed for moderate pain Max Daily Amount: 4 tablets, Disp: 20 tablet, Rfl: 0    primidone (MYSOLINE) 50 mg tablet, Half a tablet at bedtime for 2 weeks and if tolerating it well and can increase it to 1 pill at night time, Disp: 30 tablet, Rfl: 5    sitaGLIPtin (JANUVIA) 100 mg tablet, Take 100 mg by mouth daily, Disp: , Rfl:   Allergies   Allergen Reactions    Codeine GI Intolerance    Morphine GI Intolerance      There were no vitals filed for this visit

## 2022-06-21 NOTE — PROGRESS NOTES
Consultation - Radiation Oncology      DYI:[id number] : 1950  Encounter: [id number]  Patient Information: [address]  Chief Complaint   Patient presents with    Breast Cancer    Consult     Cancer Staging  No matching staging information was found for the patient  History of Present Illness    Sherren [place] 1950 is a 73 y o  female who presented because she could feel a mass in her right breast at 1:00  She underwent biopsy and was referred to Dr Sophia Maddox  for focal atypical ductal hyperplasia of the right breast  Dr Sophia Maddox performed right breast vivek  directed lumpectomy on 3/1/22 and pathology showed ductal carcinoma in situ  She underwent a second right breast lumpectomy on 22 since there were close margins on both the anterior and lateral aspect of the specimen  She is being referred by Dr Sophia Maddox and presents today for consult       10/29/21 B/L Screening Mammogram  RIGHT  1) ARCHITECTURAL DISTORTION: There is architectural distortion seen in the upper inner quadrant of the right breast in the middle depth     Left  There are no suspicious masses, grouped microcalcifications or areas of unexplained architectural distortion  The skin and nipple areolar   RECOMMENDATION:       - Diagnostic mammogram at the current time for the right breast        - Ultrasound at the current time for the right breast        - Routine screening mammogram in 1 year for the left breast      21 right diagnostic mammogram  RIGHT  A) ARCHITECTURAL DISTORTION  Mammo diagnostic right w 3d & cad:  There is architectural distortion seen in the upper inner quadrant of the right breast in the middle depth    The area in question from the prior screening mammogram is questionably identified in today's 90 degree lateral view, upper breast approximately 1 o'clock position   Targeted ultrasound throughout this region does not identify any definitive sonographic correlate   The mammographic finding remains inconclusive   Recommend consideration for contrast-enhanced breast MRI to further assess this finding, otherwise would do six-month follow-up mammogram    RECOMMENDATION:       - MRI for both breasts      12/30/21 MRI B/L breasts w wo contrast  LEFT  C) MASS: There is an 8 mm oval, homogeneous mass seen in the lower outer quadrant of the left breast in the middle depth, which is hyperintense on T2 weighted images       RIGHT  B) NON-MASS ENHANCEMENT: There is subtle non-mass enhancement seen in the upper inner quadrant of the right breast in the middle depth   This is within the area of architectural distortion described on the prior mammograms   This measures approximately 4 cm AP x 2 7 cm craniocaudal by 2 6 cm transverse   See series 91055, Image    This does increase over time (see series 504, image 85)     Lymph nodes:  There is no axillary or internal mammary lymphadenopathy  RECOMMENDATION:       - Ultrasound at the current time for the left breast        - Biopsy- US guided for the left breast        - Biopsy- Stereo guided for the right breast      1/26/22 US guided biopsy left and Mammo stereotatic right breast biopsy  LEFT  C) MASS  US guided breast biopsy left complete: Images of the left breast mass in the lower outer quadrant at 4 o'clock in the middle portion, 2 cm from the nipple were obtained  A core needle biopsy was performed under ultrasound guidance using a lateral approach  The skin was prepped in the usual fashion  Anesthesia was administered using lidocaine 1%  A 16 G marquee needle was used to obtain 4 core samples  A cylinder clip was inserted into the target area  Post-placement digital mammographic imaging demonstrates the clip was at the site  The patient tolerated the procedure well   There were no immediate complications        RIGHT  A) ARCHITECTURAL DISTORTION  Mammo stereotactic breast biopsy right (all inc): Images of the right breast architectural distortion at 1 o'clock in the middle portion were obtained  A core needle biopsy was performed under tomosynthesis guidance using a superior approach  The skin was prepped in the usual fashion  Anesthesia was administered using lidocaine 1% for superficial anesthesia and lidocaine 2% with epinephrine for deeper anesthesia  An 8 G device was advanced  Pre and postfire imaging reveal satisfactory positioning of the needle  6 core samples were obtained circumferentially   Specimen radiograph shows expected soft tissue within the core samples  A triple twist clip was inserted into the target area  Post-placement digital mammographic imaging demonstrates the triple twist clip is at the anterior aspect of the distortion  The patient tolerated the procedure well  There were no immediate complications      RECOMMENDATION:       - Return to Routine Screening for the left breast        - Surgical Consultation for the right breast         2/9/22 Consult with Dr Juana Fitzgerald  Left breast biopsy is consistent with fibroadenoma  surgery for excision of right breast focal ductal atypical hyperplasia  understand this is not cancer operation   Based on final pathology she may need additional procedures based on final pathology      3/1/22 Right Breast Vinod Brown  lumpectomy     3/14/22 Dr Juana Fitzgerald  final pathology is extensive ductal carcinoma in Situ the area of expanding over 40 mm     Margins negative  Given the strong family history and personal history of colon cancer, will obtain genetic testing prior to surgical decision making      3/30/22 Dr Juana Fitzgerald  decision RT from the original specimen and read as high risk  May re-send from re-excision   Recommend right breast lumpectomy for ductal newly diagnosed ductal carcinoma in situ with wider margins     4/19/22 right breast lumpectomy     5/9/22 Dr Juana Fitzgerald  Well-healed surgical site and no evidence of surgical site infection     Pathology reviewed  Refer to Med Onc and Rad Onc  Follow up 3 months     6/15/22 Dr Rafael Castrejon  Start letrozole     Upcomin/10/22 Surgical Oncology           Historical Information   Oncology History   Ductal carcinoma in situ (DCIS) of right breast   2022 Biopsy    Right breast stereotactic biopsy  1 o'clock  Breast tissue with focal atypical ductal hyperplasia, usual ductal hyperplasia, stromal fibrosis, and sclerosing adenosis with microcalcifications   Negative for in-situ or invasive carcinoma    Concordant  Biopsy clip is anterior to central aspect of architectural distortion  Yeni Barth reflector was intentionally targeted posterior to biopsy clip, midway between the distortion and the biopsy clip  3/1/2022 Initial Diagnosis    Ductal carcinoma in situ (DCIS) of right breast     3/1/2022 Surgery    Right breast vivek  directed lumpectomy  Ductal carcinoma in situ  4 4 cm  Margins negative - distance from DCIS to anterior margin is less than 1 mm, distance from DCIS to lateral margin less than 2 mm    ER 90  TN 5     3/14/2022 Genetic Testing    [facility]  A total of 36 genes were evaluated, including: JOLANTA, BRCA1, BRCA2, CDH1, CHEK2, PALB2, PTEN, STK11, TP53  Negative result   No pathogenic sequence variants or deletions/duplications identified     3/14/2022 Genomic Testing    DCISionRT   Addendum   SCORE 3 9      2022 Surgery    Right breast lumpectomy  Residual focus of DCIS cribriform pattern  Low nuclear grade  Superior margin is negative for tumor by 6 0 mm   The remaining surgical margins are negative for tumor by wider range           Past Medical History:   Diagnosis Date    Anxiety     Breast cancer ([address] )     Colon cancer ([address] ) 2015    chemo and surgery    Depression     Diabetes mellitus ([address] )     Ductal carcinoma in situ (DCIS) of right breast 03/10/2022    Hyperlipidemia     Hypertension     Skin cancer (melanoma) ([address] )      Past Surgical History:   Procedure Laterality Date    APPENDECTOMY      BACK SURGERY      BREAST BIOPSY Right 01/26/2022    right    BREAST LUMPECTOMY Right 3/1/2022    Procedure: RIGHT BREAST JOHN  DIRECTED LUMPECTOMY;  Surgeon: Corinne Cohn MD;  Location: [facility];  Service: Surgical Oncology    BREAST LUMPECTOMY Right 4/19/2022    Procedure: LUMPECTOMY;  Surgeon: Corinne Cohn MD;  Location: [facility];  Service: Surgical Oncology    CHOLECYSTECTOMY      COLON SURGERY      COLONOSCOPY      HYSTERECTOMY  2000    MAMMO NEEDLE LOCALIZATION LEFT (ALL INC) Right 2/24/2022    MAMMO STEREOTACTIC BREAST BIOPSY RIGHT (ALL INC) Right 1/26/2022    US GUIDED BREAST BIOPSY LEFT COMPLETE Left 1/26/2022       Family History   Problem Relation Age of Onset    No Known Problems Mother     No Known Problems Father     No Known Problems Sister     Lung cancer Brother     No Known Problems Maternal Grandmother     No Known Problems Maternal Grandfather     No Known Problems Paternal Grandmother     No Known Problems Paternal Grandfather     No Known Problems Daughter     No Known Problems Maternal Aunt     No Known Problems Paternal Aunt     Other Paternal Aunt         oral cancer    Breast cancer Paternal [de-identified]         age unknown   [place] Breast cancer Paternal [de-identified]         age unknown    Breast cancer Cousin         age unknown    Stomach cancer Paternal Uncle     No Known Problems Son     No Known Problems Son        Social History   Social History     Substance and Sexual Activity   Alcohol Use Never     Social History     Substance and Sexual Activity   Drug Use Never     Social History     Tobacco Use   Smoking Status Current Every Day Smoker    Packs/day: 1 00    Years: 55 00    Pack years: 55 00   Smokeless Tobacco Never Used         Meds/Allergies     Current Outpatient Medications:     acetaminophen (TYLENOL) 650 mg CR tablet, Take 650 mg by mouth as needed for mild pain, Disp: , Rfl:     amLODIPine (NORVASC) 2 5 mg tablet, Take 2 5 mg by mouth daily  , Disp: , Rfl:     atorvastatin (LIPITOR) 20 mg tablet, Take 20 mg by mouth daily, Disp: , Rfl:     donepezil (ARICEPT) 10 mg tablet, Take 1 tablet (10 mg total) by mouth daily at bedtime, Disp: 30 tablet, Rfl: 5    fenofibrate (TRICOR) 145 mg tablet, Take 145 mg by mouth daily, Disp: , Rfl:     gabapentin (NEURONTIN) 600 MG tablet, Take 300 mg by mouth 2 (two) times a day , Disp: , Rfl:     glimepiride (AMARYL) 4 mg tablet, Take 4 mg by mouth daily with breakfast, Disp: , Rfl:     hydrochlorothiazide (HYDRODIURIL) 12 5 mg tablet, Take 12 5 mg by mouth daily, Disp: , Rfl:     HYDROcodone-acetaminophen (Norco) 5-325 mg per tablet, Take 1 tablet by mouth every 6 (six) hours as needed for pain, Disp: 10 tablet, Rfl: 0    irbesartan (AVAPRO) 300 mg tablet, Take 300 mg by mouth daily, Disp: , Rfl:     letrozole (FEMARA) 2 5 mg tablet, Take 1 tablet (2 5 mg total) by mouth daily, Disp: 30 tablet, Rfl: 0    magnesium oxide (MAG-OX) 400 mg tablet, Take 400 mg by mouth daily, Disp: , Rfl:     Melatonin 10 MG TABS, Take 20 mg by mouth daily at bedtime as needed, Disp: , Rfl:     metFORMIN (GLUCOPHAGE) 1000 MG tablet, Take 1,000 mg by mouth 2 (two) times a day with meals  , Disp: , Rfl:     metoprolol succinate (TOPROL-XL) 50 mg 24 hr tablet, Take 75 mg by mouth daily, Disp: , Rfl:     omeprazole (PriLOSEC) 40 MG capsule, Take 40 mg by mouth daily, Disp: , Rfl:     oxybutynin (DITROPAN-XL) 5 mg 24 hr tablet, Take 5 mg by mouth daily, Disp: , Rfl:     primidone (MYSOLINE) 50 mg tablet, Half a tablet at bedtime for 2 weeks and if tolerating it well and can increase it to 1 pill at night time, Disp: 30 tablet, Rfl: 5    sitaGLIPtin (JANUVIA) 100 mg tablet, Take 100 mg by mouth daily, Disp: , Rfl:     oxyCODONE-acetaminophen (Percocet) 5-325 mg per tablet, Take 1 tablet by mouth every 6 (six) hours as needed for moderate pain Max Daily Amount: 4 tablets (Patient not taking: Reported on 6/21/2022), Disp: 20 tablet, Rfl: 0  Allergies   Allergen Reactions    Codeine GI Intolerance    Morphine GI Intolerance         Review of Systems   Constitutional: Negative for activity change, appetite change, fatigue, fever and unexpected weight change  HENT: Negative for congestion, hearing loss, mouth sores, nosebleeds, rhinorrhea, sneezing and sore throat  Eyes: Negative  Respiratory: Negative for cough and shortness of breath  Cardiovascular: Negative for chest pain, palpitations and leg swelling  Gastrointestinal: Negative for abdominal pain, blood in stool, nausea and vomiting  Endocrine: Negative  Genitourinary: Negative for difficulty urinating, dysuria, flank pain, hematuria and vaginal bleeding  Musculoskeletal: Negative for arthralgias, back pain, gait problem and joint swelling  Skin: Negative  Allergic/Immunologic: Negative  Neurological: Negative for dizziness, weakness, numbness and headaches  Hematological: Negative  Psychiatric/Behavioral: Negative  OBJECTIVE:   /76   Pulse 71   Temp 97 8 °F (36 6 °C)   Resp 16   Wt 69 9 kg (154 lb)   LMP  (LMP Unknown)   SpO2 95%   BMI 28 17 kg/m²   Pain Assessment:  0  Performance Status: Karnofsky: 100 - Fully active, able to carry on all pre-disease performed without restriction    Physical Exam  Constitutional:       Appearance: Normal appearance  She is normal weight  HENT:      Nose: No congestion or rhinorrhea  Mouth/Throat:      Pharynx: Oropharynx is clear  Eyes:      Extraocular Movements: Extraocular movements intact  Pupils: Pupils are equal, round, and reactive to light  Cardiovascular:      Rate and Rhythm: Normal rate and regular rhythm  Heart sounds: Normal heart sounds  Pulmonary:      Effort: Pulmonary effort is normal       Breath sounds: Normal breath sounds  Abdominal:      Palpations: Abdomen is soft  There is no mass  Tenderness: There is no abdominal tenderness  Musculoskeletal:         General: No swelling or tenderness  Normal range of motion  Cervical back: Normal range of motion and neck supple  Lymphadenopathy:      Cervical: No cervical adenopathy  Skin:     General: Skin is dry  Findings: No lesion or rash  Neurological:      General: No focal deficit present  Mental Status: She is alert and oriented to person, place, and time  Mental status is at baseline  Psychiatric:         Mood and Affect: Mood normal          Behavior: Behavior normal             RESULTS  Lab Results    Chemistry        Component Value Date/Time    K 5 0 03/30/2022 1217     03/30/2022 1217    CO2 28 03/30/2022 1217    BUN 34 (H) 03/30/2022 1217    CREATININE 1 30 03/30/2022 1217        Component Value Date/Time    CALCIUM 9 3 03/30/2022 1217    ALKPHOS 56 03/30/2022 1217    AST 20 03/30/2022 1217    ALT 26 03/30/2022 1217            Lab Results   Component Value Date    WBC 8 56 03/30/2022    HGB 12 4 03/30/2022    HCT 37 8 03/30/2022    MCV 91 03/30/2022     03/30/2022         Imaging Studies  No results found  Pathology:  Low-grade DCIS right breast         ASSESSMENT  1  Ductal carcinoma in situ (DCIS) of right breast  Ambulatory referral to Radiation Oncology     Cancer Staging  No matching staging information was found for the patient  PLAN/DISCUSSION  No orders of the defined types were placed in this encounter  Carola Davis is a 73y o  year old female with DCIS right breast successfully underwent lumpectomy in March repeated in April for clear margin  This is low-grade however the Dicsion RT shows an elevated score  Patient made aware through her son who had reviewed the results of the risks with surgery alone 15% is reduced to 7% at 10 years with radiation therapy  They have decided for adjuvant radiation therapy      We discussed a treatment course hypofractionation 21 treatments based on clinical examination today of the size of charly's breast and dimension  Side effects are minimal to moderate skin reaction and fatigue  She will return next week for CT simulation treatment planning and will begin treatments officially a week later  Patient already on letrozole  aLtonya Webster MD  5/43/5493,65:11 AM      Portions of the record may have been created with voice recognition software  Occasional wrong word or "sound a like" substitutions may have occurred due to the inherent limitations of voice recognition software  Read the chart carefully and recognize, using context, where substitutions have occurred

## 2022-06-28 ENCOUNTER — APPOINTMENT (OUTPATIENT)
Dept: RADIATION ONCOLOGY | Facility: CLINIC | Age: 72
End: 2022-06-28
Attending: RADIOLOGY
Payer: COMMERCIAL

## 2022-06-28 PROCEDURE — 77332 RADIATION TREATMENT AID(S): CPT | Performed by: RADIOLOGY

## 2022-06-28 PROCEDURE — 77280 THER RAD SIMULAJ FIELD SMPL: CPT | Performed by: RADIOLOGY

## 2022-06-30 PROCEDURE — 77295 3-D RADIOTHERAPY PLAN: CPT | Performed by: RADIOLOGY

## 2022-06-30 PROCEDURE — 77300 RADIATION THERAPY DOSE PLAN: CPT | Performed by: RADIOLOGY

## 2022-06-30 PROCEDURE — 77334 RADIATION TREATMENT AID(S): CPT | Performed by: RADIOLOGY

## 2022-07-08 DIAGNOSIS — D05.11 DUCTAL CARCINOMA IN SITU (DCIS) OF RIGHT BREAST: Primary | ICD-10-CM

## 2022-07-11 ENCOUNTER — APPOINTMENT (OUTPATIENT)
Dept: RADIATION ONCOLOGY | Facility: CLINIC | Age: 72
End: 2022-07-11
Attending: RADIOLOGY
Payer: COMMERCIAL

## 2022-07-12 ENCOUNTER — APPOINTMENT (OUTPATIENT)
Dept: RADIATION ONCOLOGY | Facility: CLINIC | Age: 72
End: 2022-07-12
Attending: RADIOLOGY
Payer: COMMERCIAL

## 2022-07-13 ENCOUNTER — APPOINTMENT (OUTPATIENT)
Dept: RADIATION ONCOLOGY | Facility: CLINIC | Age: 72
End: 2022-07-13
Payer: COMMERCIAL

## 2022-07-13 ENCOUNTER — APPOINTMENT (OUTPATIENT)
Dept: RADIATION ONCOLOGY | Facility: CLINIC | Age: 72
End: 2022-07-13
Attending: RADIOLOGY
Payer: COMMERCIAL

## 2022-07-13 PROCEDURE — 77412 RADIATION TX DELIVERY LVL 3: CPT | Performed by: RADIOLOGY

## 2022-07-13 PROCEDURE — 77427 RADIATION TX MANAGEMENT X5: CPT | Performed by: RADIOLOGY

## 2022-07-14 ENCOUNTER — APPOINTMENT (OUTPATIENT)
Dept: LAB | Facility: HOSPITAL | Age: 72
End: 2022-07-14
Payer: COMMERCIAL

## 2022-07-14 ENCOUNTER — APPOINTMENT (OUTPATIENT)
Dept: RADIATION ONCOLOGY | Facility: CLINIC | Age: 72
End: 2022-07-14
Attending: RADIOLOGY
Payer: COMMERCIAL

## 2022-07-14 DIAGNOSIS — Z78.0 POST-MENOPAUSAL: ICD-10-CM

## 2022-07-14 DIAGNOSIS — D05.11 DUCTAL CARCINOMA IN SITU (DCIS) OF RIGHT BREAST: ICD-10-CM

## 2022-07-14 LAB
ALBUMIN SERPL BCP-MCNC: 3.5 G/DL (ref 3.5–5)
ALP SERPL-CCNC: 64 U/L (ref 46–116)
ALT SERPL W P-5'-P-CCNC: 22 U/L (ref 12–78)
AST SERPL W P-5'-P-CCNC: 18 U/L (ref 5–45)
BASOPHILS # BLD AUTO: 0.04 THOUSANDS/ΜL (ref 0–0.1)
BASOPHILS NFR BLD AUTO: 0 % (ref 0–1)
BILIRUB DIRECT SERPL-MCNC: 0.11 MG/DL (ref 0–0.2)
BILIRUB SERPL-MCNC: 0.42 MG/DL (ref 0.2–1)
EOSINOPHIL # BLD AUTO: 0.09 THOUSAND/ΜL (ref 0–0.61)
EOSINOPHIL NFR BLD AUTO: 1 % (ref 0–6)
ERYTHROCYTE [DISTWIDTH] IN BLOOD BY AUTOMATED COUNT: 13.1 % (ref 11.6–15.1)
HCT VFR BLD AUTO: 35 % (ref 34.8–46.1)
HGB BLD-MCNC: 11.4 G/DL (ref 11.5–15.4)
IMM GRANULOCYTES # BLD AUTO: 0.05 THOUSAND/UL (ref 0–0.2)
IMM GRANULOCYTES NFR BLD AUTO: 1 % (ref 0–2)
LYMPHOCYTES # BLD AUTO: 1.61 THOUSANDS/ΜL (ref 0.6–4.47)
LYMPHOCYTES NFR BLD AUTO: 16 % (ref 14–44)
MCH RBC QN AUTO: 29.3 PG (ref 26.8–34.3)
MCHC RBC AUTO-ENTMCNC: 32.6 G/DL (ref 31.4–37.4)
MCV RBC AUTO: 90 FL (ref 82–98)
MONOCYTES # BLD AUTO: 0.82 THOUSAND/ΜL (ref 0.17–1.22)
MONOCYTES NFR BLD AUTO: 8 % (ref 4–12)
NEUTROPHILS # BLD AUTO: 7.22 THOUSANDS/ΜL (ref 1.85–7.62)
NEUTS SEG NFR BLD AUTO: 74 % (ref 43–75)
NRBC BLD AUTO-RTO: 0 /100 WBCS
PLATELET # BLD AUTO: 300 THOUSANDS/UL (ref 149–390)
PMV BLD AUTO: 10 FL (ref 8.9–12.7)
PROT SERPL-MCNC: 6.6 G/DL (ref 6.4–8.2)
RBC # BLD AUTO: 3.89 MILLION/UL (ref 3.81–5.12)
WBC # BLD AUTO: 9.83 THOUSAND/UL (ref 4.31–10.16)

## 2022-07-14 PROCEDURE — 80076 HEPATIC FUNCTION PANEL: CPT

## 2022-07-14 PROCEDURE — 36415 COLL VENOUS BLD VENIPUNCTURE: CPT

## 2022-07-14 PROCEDURE — 77412 RADIATION TX DELIVERY LVL 3: CPT | Performed by: RADIOLOGY

## 2022-07-14 PROCEDURE — 85025 COMPLETE CBC W/AUTO DIFF WBC: CPT

## 2022-07-15 ENCOUNTER — APPOINTMENT (OUTPATIENT)
Dept: RADIATION ONCOLOGY | Facility: CLINIC | Age: 72
End: 2022-07-15
Attending: RADIOLOGY
Payer: COMMERCIAL

## 2022-07-15 PROCEDURE — 77412 RADIATION TX DELIVERY LVL 3: CPT | Performed by: RADIOLOGY

## 2022-07-18 ENCOUNTER — APPOINTMENT (OUTPATIENT)
Dept: RADIATION ONCOLOGY | Facility: CLINIC | Age: 72
End: 2022-07-18
Attending: RADIOLOGY
Payer: COMMERCIAL

## 2022-07-18 PROCEDURE — 77412 RADIATION TX DELIVERY LVL 3: CPT | Performed by: STUDENT IN AN ORGANIZED HEALTH CARE EDUCATION/TRAINING PROGRAM

## 2022-07-19 ENCOUNTER — APPOINTMENT (OUTPATIENT)
Dept: RADIATION ONCOLOGY | Facility: CLINIC | Age: 72
End: 2022-07-19
Attending: RADIOLOGY
Payer: COMMERCIAL

## 2022-07-19 PROCEDURE — 77336 RADIATION PHYSICS CONSULT: CPT | Performed by: RADIOLOGY

## 2022-07-19 PROCEDURE — 77412 RADIATION TX DELIVERY LVL 3: CPT | Performed by: RADIOLOGY

## 2022-07-19 PROCEDURE — 77417 THER RADIOLOGY PORT IMAGE(S): CPT | Performed by: RADIOLOGY

## 2022-07-20 ENCOUNTER — APPOINTMENT (OUTPATIENT)
Dept: RADIATION ONCOLOGY | Facility: CLINIC | Age: 72
End: 2022-07-20
Attending: RADIOLOGY
Payer: COMMERCIAL

## 2022-07-20 PROCEDURE — 77427 RADIATION TX MANAGEMENT X5: CPT | Performed by: RADIOLOGY

## 2022-07-20 PROCEDURE — 77412 RADIATION TX DELIVERY LVL 3: CPT | Performed by: RADIOLOGY

## 2022-07-21 ENCOUNTER — APPOINTMENT (OUTPATIENT)
Dept: RADIATION ONCOLOGY | Facility: CLINIC | Age: 72
End: 2022-07-21
Payer: COMMERCIAL

## 2022-07-21 ENCOUNTER — APPOINTMENT (OUTPATIENT)
Dept: RADIATION ONCOLOGY | Facility: CLINIC | Age: 72
End: 2022-07-21
Attending: RADIOLOGY
Payer: COMMERCIAL

## 2022-07-21 PROCEDURE — 77412 RADIATION TX DELIVERY LVL 3: CPT | Performed by: RADIOLOGY

## 2022-07-22 ENCOUNTER — APPOINTMENT (OUTPATIENT)
Dept: RADIATION ONCOLOGY | Facility: CLINIC | Age: 72
End: 2022-07-22
Attending: RADIOLOGY
Payer: COMMERCIAL

## 2022-07-22 ENCOUNTER — APPOINTMENT (OUTPATIENT)
Dept: RADIATION ONCOLOGY | Facility: CLINIC | Age: 72
End: 2022-07-22
Payer: COMMERCIAL

## 2022-07-22 PROCEDURE — 77412 RADIATION TX DELIVERY LVL 3: CPT | Performed by: INTERNAL MEDICINE

## 2022-07-25 ENCOUNTER — APPOINTMENT (OUTPATIENT)
Dept: RADIATION ONCOLOGY | Facility: CLINIC | Age: 72
End: 2022-07-25
Attending: RADIOLOGY
Payer: COMMERCIAL

## 2022-07-25 ENCOUNTER — TELEPHONE (OUTPATIENT)
Dept: RADIATION ONCOLOGY | Facility: CLINIC | Age: 72
End: 2022-07-25

## 2022-07-25 PROCEDURE — 77412 RADIATION TX DELIVERY LVL 3: CPT | Performed by: RADIOLOGY

## 2022-07-25 NOTE — TELEPHONE ENCOUNTER
----- Message from nAna Molina sent at 7/25/2022 11:07 AM EDT -----  Regarding: Savannah Chalo not with her for her appointments while she gets treatments  She gets rides from the hospital back and forth  She should not be getting missed appointments if she is already there  This needs to be addressed with the staff in that office

## 2022-07-25 NOTE — TELEPHONE ENCOUNTER
M for Garrett Marrufo informing him a follow up appt with Dr Lora Buckner has been setup for 9/6 at 2pm   Asked he call back if this date/time does not work for him  Direct teams number provided

## 2022-07-25 NOTE — TELEPHONE ENCOUNTER
Called Bob  He stated he got a message that she missed an appointment  After reviewing her appointment desk, she had a no show appointment with Dr Wayne Joshi on 7/20/22  Christina Valderrama stated he was not aware of this appointment  Made him aware that I would reach out to Dr Anastacio Ocampo office for him and request outreach  Made him aware that she is currently scheduled to follow up with Dr Wayne Joshi 9/27/22 and that I am not sure if she needs sooner appointment  He requested that all scheduling be done through him and Perfecto Mccormick uses [employer]

## 2022-07-26 ENCOUNTER — APPOINTMENT (OUTPATIENT)
Dept: RADIATION ONCOLOGY | Facility: CLINIC | Age: 72
End: 2022-07-26
Attending: RADIOLOGY
Payer: COMMERCIAL

## 2022-07-26 PROCEDURE — 77417 THER RADIOLOGY PORT IMAGE(S): CPT | Performed by: RADIOLOGY

## 2022-07-26 PROCEDURE — 77412 RADIATION TX DELIVERY LVL 3: CPT | Performed by: RADIOLOGY

## 2022-07-26 PROCEDURE — 77336 RADIATION PHYSICS CONSULT: CPT | Performed by: RADIOLOGY

## 2022-07-27 ENCOUNTER — APPOINTMENT (OUTPATIENT)
Dept: RADIATION ONCOLOGY | Facility: CLINIC | Age: 72
End: 2022-07-27
Attending: RADIOLOGY
Payer: COMMERCIAL

## 2022-07-27 PROCEDURE — 77427 RADIATION TX MANAGEMENT X5: CPT | Performed by: RADIOLOGY

## 2022-07-27 PROCEDURE — 77412 RADIATION TX DELIVERY LVL 3: CPT | Performed by: RADIOLOGY

## 2022-07-28 ENCOUNTER — APPOINTMENT (OUTPATIENT)
Dept: RADIATION ONCOLOGY | Facility: CLINIC | Age: 72
End: 2022-07-28
Attending: RADIOLOGY
Payer: COMMERCIAL

## 2022-07-28 PROCEDURE — 77412 RADIATION TX DELIVERY LVL 3: CPT | Performed by: RADIOLOGY

## 2022-07-29 ENCOUNTER — APPOINTMENT (OUTPATIENT)
Dept: RADIATION ONCOLOGY | Facility: CLINIC | Age: 72
End: 2022-07-29
Attending: RADIOLOGY
Payer: COMMERCIAL

## 2022-07-29 PROCEDURE — 77412 RADIATION TX DELIVERY LVL 3: CPT | Performed by: RADIOLOGY

## 2022-08-01 ENCOUNTER — APPOINTMENT (OUTPATIENT)
Dept: RADIATION ONCOLOGY | Facility: CLINIC | Age: 72
End: 2022-08-01
Attending: RADIOLOGY
Payer: MEDICARE

## 2022-08-01 PROCEDURE — 77412 RADIATION TX DELIVERY LVL 3: CPT | Performed by: STUDENT IN AN ORGANIZED HEALTH CARE EDUCATION/TRAINING PROGRAM

## 2022-08-02 ENCOUNTER — APPOINTMENT (OUTPATIENT)
Dept: RADIATION ONCOLOGY | Facility: CLINIC | Age: 72
End: 2022-08-02
Attending: RADIOLOGY
Payer: MEDICARE

## 2022-08-02 PROCEDURE — 77336 RADIATION PHYSICS CONSULT: CPT | Performed by: RADIOLOGY

## 2022-08-02 PROCEDURE — 77417 THER RADIOLOGY PORT IMAGE(S): CPT | Performed by: RADIOLOGY

## 2022-08-02 PROCEDURE — 77412 RADIATION TX DELIVERY LVL 3: CPT | Performed by: RADIOLOGY

## 2022-08-03 ENCOUNTER — APPOINTMENT (OUTPATIENT)
Dept: RADIATION ONCOLOGY | Facility: CLINIC | Age: 72
End: 2022-08-03
Attending: RADIOLOGY
Payer: MEDICARE

## 2022-08-04 ENCOUNTER — APPOINTMENT (OUTPATIENT)
Dept: RADIATION ONCOLOGY | Facility: CLINIC | Age: 72
End: 2022-08-04
Payer: MEDICARE

## 2022-08-04 ENCOUNTER — APPOINTMENT (OUTPATIENT)
Dept: RADIATION ONCOLOGY | Facility: CLINIC | Age: 72
End: 2022-08-04
Attending: RADIOLOGY
Payer: MEDICARE

## 2022-08-04 PROCEDURE — 77412 RADIATION TX DELIVERY LVL 3: CPT | Performed by: RADIOLOGY

## 2022-08-04 PROCEDURE — 77427 RADIATION TX MANAGEMENT X5: CPT | Performed by: RADIOLOGY

## 2022-08-05 ENCOUNTER — APPOINTMENT (OUTPATIENT)
Dept: RADIATION ONCOLOGY | Facility: CLINIC | Age: 72
End: 2022-08-05
Attending: RADIOLOGY
Payer: MEDICARE

## 2022-08-05 ENCOUNTER — APPOINTMENT (OUTPATIENT)
Dept: LAB | Facility: HOSPITAL | Age: 72
End: 2022-08-05
Payer: COMMERCIAL

## 2022-08-05 DIAGNOSIS — D05.11 INTRADUCTAL CARCINOMA IN SITU OF RIGHT BREAST: Primary | ICD-10-CM

## 2022-08-05 LAB
ALBUMIN SERPL BCP-MCNC: 3.7 G/DL (ref 3.5–5)
ALP SERPL-CCNC: 62 U/L (ref 46–116)
ALT SERPL W P-5'-P-CCNC: 22 U/L (ref 12–78)
ANION GAP SERPL CALCULATED.3IONS-SCNC: 6 MMOL/L (ref 4–13)
AST SERPL W P-5'-P-CCNC: 15 U/L (ref 5–45)
BILIRUB SERPL-MCNC: 0.34 MG/DL (ref 0.2–1)
BUN SERPL-MCNC: 21 MG/DL (ref 5–25)
CALCIUM SERPL-MCNC: 9.2 MG/DL (ref 8.3–10.1)
CHLORIDE SERPL-SCNC: 97 MMOL/L (ref 96–108)
CO2 SERPL-SCNC: 31 MMOL/L (ref 21–32)
CREAT SERPL-MCNC: 1.18 MG/DL (ref 0.6–1.3)
GFR SERPL CREATININE-BSD FRML MDRD: 47 ML/MIN/1.73SQ M
GLUCOSE P FAST SERPL-MCNC: 76 MG/DL (ref 65–99)
POTASSIUM SERPL-SCNC: 5.1 MMOL/L (ref 3.5–5.3)
PROT SERPL-MCNC: 6.8 G/DL (ref 6.4–8.4)
SODIUM SERPL-SCNC: 134 MMOL/L (ref 135–147)

## 2022-08-05 PROCEDURE — 77412 RADIATION TX DELIVERY LVL 3: CPT | Performed by: INTERNAL MEDICINE

## 2022-08-05 PROCEDURE — 80053 COMPREHEN METABOLIC PANEL: CPT

## 2022-08-08 ENCOUNTER — APPOINTMENT (OUTPATIENT)
Dept: RADIATION ONCOLOGY | Facility: CLINIC | Age: 72
End: 2022-08-08
Attending: RADIOLOGY
Payer: MEDICARE

## 2022-08-08 PROCEDURE — 77412 RADIATION TX DELIVERY LVL 3: CPT | Performed by: RADIOLOGY

## 2022-08-09 ENCOUNTER — APPOINTMENT (OUTPATIENT)
Dept: RADIATION ONCOLOGY | Facility: CLINIC | Age: 72
End: 2022-08-09
Attending: RADIOLOGY
Payer: MEDICARE

## 2022-08-09 ENCOUNTER — APPOINTMENT (OUTPATIENT)
Dept: RADIATION ONCOLOGY | Facility: CLINIC | Age: 72
End: 2022-08-09
Payer: MEDICARE

## 2022-08-09 PROBLEM — Z79.811 USE OF LETROZOLE (FEMARA): Status: ACTIVE | Noted: 2022-08-09

## 2022-08-09 PROCEDURE — 77412 RADIATION TX DELIVERY LVL 3: CPT | Performed by: RADIOLOGY

## 2022-08-10 ENCOUNTER — OFFICE VISIT (OUTPATIENT)
Dept: SURGICAL ONCOLOGY | Facility: CLINIC | Age: 72
End: 2022-08-10
Payer: COMMERCIAL

## 2022-08-10 ENCOUNTER — TELEPHONE (OUTPATIENT)
Dept: HEMATOLOGY ONCOLOGY | Facility: CLINIC | Age: 72
End: 2022-08-10

## 2022-08-10 ENCOUNTER — APPOINTMENT (OUTPATIENT)
Dept: RADIATION ONCOLOGY | Facility: CLINIC | Age: 72
End: 2022-08-10
Attending: RADIOLOGY
Payer: MEDICARE

## 2022-08-10 VITALS
OXYGEN SATURATION: 97 % | TEMPERATURE: 97.2 F | SYSTOLIC BLOOD PRESSURE: 118 MMHG | BODY MASS INDEX: 30.46 KG/M2 | WEIGHT: 165.5 LBS | DIASTOLIC BLOOD PRESSURE: 70 MMHG | RESPIRATION RATE: 16 BRPM | HEIGHT: 62 IN | HEART RATE: 65 BPM

## 2022-08-10 DIAGNOSIS — Z79.811 USE OF LETROZOLE (FEMARA): ICD-10-CM

## 2022-08-10 DIAGNOSIS — D05.11 DUCTAL CARCINOMA IN SITU (DCIS) OF RIGHT BREAST: Primary | ICD-10-CM

## 2022-08-10 PROCEDURE — 77336 RADIATION PHYSICS CONSULT: CPT | Performed by: RADIOLOGY

## 2022-08-10 PROCEDURE — 99214 OFFICE O/P EST MOD 30 MIN: CPT | Performed by: SURGERY

## 2022-08-10 PROCEDURE — 77412 RADIATION TX DELIVERY LVL 3: CPT | Performed by: RADIOLOGY

## 2022-08-10 NOTE — TELEPHONE ENCOUNTER
Paresh Encarnacion calling to inform office that patient will come tomorrow morning to  her phone that was left there at her appointment today  He received a call from Blayne Ayala at office informing him that it was left

## 2022-08-10 NOTE — PROGRESS NOTES
Surgical Oncology Follow Up  [facility]  [facility]ICAL [facility] [address]  [facility] [address]    Cesia Sequeira  1950  [id number]      Chief Complaint   Patient presents with    Follow-up        Assessment & Plan:     She is here for follow-up with right breast cancer she is completing her radiation tomorrow she is overall doing well  Follow her in 6 months  Cancer History:     Oncology History   Ductal carcinoma in situ (DCIS) of right breast   1/26/2022 Biopsy    Right breast stereotactic biopsy  1 o'clock  Breast tissue with focal atypical ductal hyperplasia, usual ductal hyperplasia, stromal fibrosis, and sclerosing adenosis with microcalcifications   Negative for in-situ or invasive carcinoma    Concordant  Biopsy clip is anterior to central aspect of architectural distortion  Daniella Clack reflector was intentionally targeted posterior to biopsy clip, midway between the distortion and the biopsy clip  3/1/2022 Surgery    Right breast vivek  directed lumpectomy  Ductal carcinoma in situ  4 4 cm  Margins negative - distance from DCIS to anterior margin is less than 1 mm, distance from DCIS to lateral margin less than 2 mm    ER 90  MA 5     3/1/2022 Initial Diagnosis    Ductal carcinoma in situ (DCIS) of right breast     3/14/2022 Genetic Testing    [facility]  A total of 36 genes were evaluated, including: JOLANTA, BRCA1, BRCA2, CDH1, CHEK2, PALB2, PTEN, STK11, TP53  Negative result  No pathogenic sequence variants or deletions/duplications identified     3/14/2022 Genomic Testing    DCISionRT   Addendum   SCORE 3 9      4/19/2022 Surgery    Right breast lumpectomy  Residual focus of DCIS cribriform pattern  Low nuclear grade  Superior margin is negative for tumor by 6 0 mm   The remaining surgical margins are negative for tumor by wider range           Interval History:   Follow-up with right breast cancer      Review of Systems:   Review of Systems   Constitutional: Negative for chills and fever  HENT: Negative for ear pain and sore throat  Eyes: Negative for pain and visual disturbance  Respiratory: Negative for cough and shortness of breath  Cardiovascular: Negative for chest pain and palpitations  Gastrointestinal: Negative for abdominal pain and vomiting  Genitourinary: Negative for dysuria and hematuria  Musculoskeletal: Negative for arthralgias and back pain  Skin: Negative for color change and rash  Neurological: Negative for seizures and syncope  All other systems reviewed and are negative        Past Medical History     Patient Active Problem List   Diagnosis    Tremor    Memory difficulty    Breast fibroadenoma in female, left    Atypical ductal hyperplasia of right breast    Ductal carcinoma in situ (DCIS) of right breast    [facility] ([facility] )    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 (HCC)    Generalized anxiety disorder    Hypertension associated with diabetes ([place] [address] )    Stage 3a chronic kidney disease ([place] [address] )    Gastroesophageal reflux disease    Current every day smoker    Post-menopausal    History of colon cancer, stage III    Use of letrozole (Femara)     Past Medical History:   Diagnosis Date    Anxiety     Breast cancer ([place] [address] )     Colon cancer ([place] [address] ) 2015    chemo and surgery    Depression     [facility]     Ductal carcinoma in situ (DCIS) of right breast 03/10/2022    Hyperlipidemia     Hypertension     Skin cancer (melanoma) ([place] [address] )      Past Surgical History:   Procedure Laterality Date    APPENDECTOMY      BACK SURGERY      BREAST BIOPSY Right 01/26/2022    right    BREAST LUMPECTOMY Right 3/1/2022    Procedure: RIGHT BREAST JOHN  DIRECTED LUMPECTOMY;  Surgeon: Sharleen Boeck, MD;  Location: [facility];  Service: Surgical Oncology    BREAST LUMPECTOMY Right 4/19/2022    Procedure: LUMPECTOMY;  Surgeon: Sharleen Boeck, MD; Location: [facility];  Service: Surgical Oncology    CHOLECYSTECTOMY      COLON SURGERY      COLONOSCOPY      HYSTERECTOMY  2000    MAMMO NEEDLE LOCALIZATION LEFT (ALL INC) Right 2/24/2022    MAMMO STEREOTACTIC BREAST BIOPSY RIGHT ([employer]) Right 1/26/2022    US GUIDED BREAST BIOPSY LEFT COMPLETE Left 1/26/2022     Family History   Problem Relation Age of Onset    No Known Problems Mother     No Known Problems Father     No Known Problems Sister     Lung cancer Brother     No Known Problems Maternal Grandmother     No Known Problems Maternal Grandfather     No Known Problems Paternal Grandmother     No Known Problems Paternal Grandfather     No Known Problems Daughter     No Known Problems Maternal Aunt     No Known Problems Paternal Aunt     Other Paternal Aunt         oral cancer    Breast cancer Paternal Aunt         age unknown   [facility] Breast cancer Paternal [de-identified]         age unknown    Breast cancer Cousin         age unknown    Stomach cancer Paternal Uncle     No Known Problems Son     No Known Problems Son      Social History     Socioeconomic History    Marital status:      Spouse name: Not on file    Number of children: Not on file    Years of education: Not on file    Highest education level: Not on file   Occupational History    Not on file   Tobacco Use    Smoking status: Current Every Day Smoker     Packs/day: 1 00     Years: 55 00     Pack years: 55 00     Types: Cigarettes     Start date: 1/1/1968    Smokeless tobacco: Never Used   Vaping Use    Vaping Use: Never used   Substance and Sexual Activity    Alcohol use: Never    Drug use: Never    Sexual activity: Not Currently   Other Topics Concern    Not on file   Social History Narrative    Not on file     Social Determinants of Health     Financial Resource Strain: Not on file   Food Insecurity: Not on file   Transportation Needs: Not on file   Physical Activity: Not on file   Stress: Not on file   Social Connections: Not on file   Intimate Partner Violence: Not on file   Housing Stability: Not on file       Current Outpatient Medications:     amLODIPine (NORVASC) 2 5 mg tablet, Take 2 5 mg by mouth daily  , Disp: , Rfl:     atorvastatin (LIPITOR) 20 mg tablet, Take 20 mg by mouth daily, Disp: , Rfl:     donepezil (ARICEPT) 10 mg tablet, Take 1 tablet (10 mg total) by mouth daily at bedtime, Disp: 30 tablet, Rfl: 5    fenofibrate (TRICOR) 145 mg tablet, Take 145 mg by mouth daily, Disp: , Rfl:     glimepiride (AMARYL) 4 mg tablet, Take 4 mg by mouth daily with breakfast, Disp: , Rfl:     hydrochlorothiazide (HYDRODIURIL) 12 5 mg tablet, Take 12 5 mg by mouth daily, Disp: , Rfl:     irbesartan (AVAPRO) 300 mg tablet, Take 300 mg by mouth daily, Disp: , Rfl:     letrozole (FEMARA) 2 5 mg tablet, TAKE 1 TABLET BY MOUTH EVERY DAY, Disp: 90 tablet, Rfl: 1    metFORMIN (GLUCOPHAGE) 1000 MG tablet, Take 1,000 mg by mouth 2 (two) times a day with meals  , Disp: , Rfl:     metoprolol succinate (TOPROL-XL) 50 mg 24 hr tablet, Take 75 mg by mouth daily, Disp: , Rfl:     omeprazole (PriLOSEC) 40 MG capsule, Take 40 mg by mouth daily, Disp: , Rfl:     oxybutynin (DITROPAN-XL) 5 mg 24 hr tablet, Take 5 mg by mouth daily, Disp: , Rfl:     primidone (MYSOLINE) 50 mg tablet, 1 tab at bedtime, Disp: 90 tablet, Rfl: 0    sitaGLIPtin (JANUVIA) 100 mg tablet, Take 100 mg by mouth daily, Disp: , Rfl:     acetaminophen (TYLENOL) 650 mg CR tablet, Take 650 mg by mouth as needed for mild pain, Disp: , Rfl:     gabapentin (NEURONTIN) 600 MG tablet, Take 300 mg by mouth 2 (two) times a day , Disp: , Rfl:     HYDROcodone-acetaminophen (Norco) 5-325 mg per tablet, Take 1 tablet by mouth every 6 (six) hours as needed for pain, Disp: 10 tablet, Rfl: 0    Melatonin 10 MG TABS, Take 20 mg by mouth daily at bedtime as needed (Patient not taking: Reported on 8/10/2022), Disp: , Rfl:     oxyCODONE-acetaminophen (Percocet) 5-325 mg per tablet, Take 1 tablet by mouth every 6 (six) hours as needed for moderate pain Max Daily Amount: 4 tablets (Patient not taking: Reported on 6/21/2022), Disp: 20 tablet, Rfl: 0  Allergies   Allergen Reactions    Codeine GI Intolerance    Morphine GI Intolerance       Physical Exam:     Vitals:    08/10/22 0947   BP: 118/70   Pulse: 65   Resp: 16   Temp: (!) 97 2 °F (36 2 °C)   SpO2: 97%     Physical Exam  Constitutional:       Appearance: Normal appearance  HENT:      Head: Normocephalic and atraumatic  Nose: Nose normal       Mouth/Throat:      Mouth: Mucous membranes are moist    Eyes:      Pupils: Pupils are equal, round, and reactive to light  Cardiovascular:      Rate and Rhythm: Normal rate  Pulses: Normal pulses  Heart sounds: Normal heart sounds  Pulmonary:      Effort: Pulmonary effort is normal       Breath sounds: Normal breath sounds  Chest:          Comments: Right breast well-healed surgical incision  Bilateral breast examination no palpable mass or masses mild erythematous right breast from radiation but she is recovering well bilateral axillary and supraclavicular examination no palpable adenopathy  Abdominal:      General: Bowel sounds are normal       Palpations: Abdomen is soft  Musculoskeletal:         General: Normal range of motion  Cervical back: Normal range of motion and neck supple  Skin:     General: Skin is warm  Neurological:      General: No focal deficit present  Mental Status: She is alert and oriented to person, place, and time  Psychiatric:         Mood and Affect: Mood normal          Behavior: Behavior normal          Thought Content: Thought content normal          Judgment: Judgment normal            Results & Discussion:   I did review discuss further imaging studies as well as close surveillance  she understands and  agrees   All patient questions were answered  Advance Care Planning/Advance Directives:   Zabrina Younger Courtney Fair MD discussed the disease status with Anna Molina  today 08/10/22  treatment plans and follow-up with the patient

## 2022-08-11 ENCOUNTER — APPOINTMENT (OUTPATIENT)
Dept: RADIATION ONCOLOGY | Facility: CLINIC | Age: 72
End: 2022-08-11
Attending: RADIOLOGY
Payer: MEDICARE

## 2022-08-11 PROCEDURE — 77412 RADIATION TX DELIVERY LVL 3: CPT | Performed by: RADIOLOGY

## 2022-09-06 ENCOUNTER — OFFICE VISIT (OUTPATIENT)
Dept: HEMATOLOGY ONCOLOGY | Facility: CLINIC | Age: 72
End: 2022-09-06
Payer: MEDICARE

## 2022-09-06 VITALS
HEART RATE: 56 BPM | WEIGHT: 150 LBS | HEIGHT: 62 IN | BODY MASS INDEX: 27.6 KG/M2 | DIASTOLIC BLOOD PRESSURE: 66 MMHG | OXYGEN SATURATION: 98 % | RESPIRATION RATE: 16 BRPM | SYSTOLIC BLOOD PRESSURE: 118 MMHG

## 2022-09-06 DIAGNOSIS — Z79.811 USE OF LETROZOLE (FEMARA): ICD-10-CM

## 2022-09-06 DIAGNOSIS — D05.11 DUCTAL CARCINOMA IN SITU (DCIS) OF RIGHT BREAST: Primary | ICD-10-CM

## 2022-09-06 DIAGNOSIS — Z85.038 HISTORY OF COLON CANCER, STAGE III: ICD-10-CM

## 2022-09-06 DIAGNOSIS — Z78.0 POST-MENOPAUSAL: ICD-10-CM

## 2022-09-06 PROBLEM — C18.9 COLON CANCER (HCC): Status: RESOLVED | Noted: 2021-11-17 | Resolved: 2022-09-06

## 2022-09-06 PROCEDURE — 99214 OFFICE O/P EST MOD 30 MIN: CPT | Performed by: INTERNAL MEDICINE

## 2022-09-06 NOTE — PROGRESS NOTES
[facility] - Hematology & Medical Oncology  Outpatient Visit Encounter Note    Che Diaz 73 y o  female [id number] Date:  9/6/2022    ONCOLOGY HISTORY        Oncology History   Ductal carcinoma in situ (DCIS) of right breast   1/26/2022 Biopsy    Right breast stereotactic biopsy  1 o'clock  Breast tissue with focal atypical ductal hyperplasia, usual ductal hyperplasia, stromal fibrosis, and sclerosing adenosis with microcalcifications   Negative for in-situ or invasive carcinoma    Concordant  Biopsy clip is anterior to central aspect of architectural distortion  Lowanda Melter reflector was intentionally targeted posterior to biopsy clip, midway between the distortion and the biopsy clip  3/1/2022 Surgery    Right breast vivek  directed lumpectomy  Ductal carcinoma in situ  4 4 cm  Margins negative - distance from DCIS to anterior margin is less than 1 mm, distance from DCIS to lateral margin less than 2 mm    ER 90  IA 5     3/1/2022 Initial Diagnosis    Ductal carcinoma in situ (DCIS) of right breast     3/14/2022 Genetic Testing    [employer]  A total of 36 genes were evaluated, including: JOLANTA, BRCA1, BRCA2, CDH1, CHEK2, PALB2, PTEN, STK11, TP53  Negative result  No pathogenic sequence variants or deletions/duplications identified     3/14/2022 Genomic Testing    DCISionRT   Addendum   SCORE 3 9      4/19/2022 Surgery    Right breast lumpectomy  Residual focus of DCIS cribriform pattern  Low nuclear grade  Superior margin is negative for tumor by 6 0 mm   The remaining surgical margins are negative for tumor by wider range           SUBJECTIVE      Che Diaz is a 73 y o  here for new consultation with me today  The patient is referred by Dr Phani Sweeney and the reason for consultation is DCIS      In review of the chart and talking with the patient, as detailed above, after self palpation of a breast mass on the right side she underwent imaging that showed an 8 mm oval homogeneous mass in her left breast with a mass also seen on the right breast   She thereafter underwent biopsy of both her left and her right breast lesions as seen on imaging  Her left breast biopsy showed fibroadenoma and thereafter surgery was done for excision of the right breast that showed focal ductal atypical hyperplasia  Her final pathology report showed extensive DCIS with negative margins  She thereafter underwent another lumpectomy for wider margins  Here with her son  Declined breast exam  Denies any f/c/n/v/cp/ap/sob/cough  Denies diarrhea or skin rash  Denies known osteoporosis  Is a smoker a 1ppd  She has history of stage 3 colon cancer in 2015 at [facility] in [place], [place]  She got surgery and chemotherapy after that for 6 months  She tells me that she has been cancer free from that since then  THIS VISIT    No breast changes  Denies any f/c/n/v/cp/ap/sob/cough  Denies diarrhea or skin rash  No issues with femara  No blood stool  I have reviewed the relevant past medical, surgical, social and family history  I have also reviewed allergies and medications for this patient  Review of Systems  Review of Systems   All other systems reviewed and are negative  OBJECTIVE     Physical Exam  Vitals:    09/06/22 1339   BP: 118/66   BP Location: Left arm   Patient Position: Sitting   Cuff Size: Adult   Pulse: 56   Resp: 16   SpO2: 98%   Weight: 68 kg (150 lb)   Height: 5' 2" (1 575 m)       Physical Exam  Vitals reviewed  Constitutional:       General: She is not in acute distress  Appearance: She is not ill-appearing, toxic-appearing or diaphoretic  HENT:      Head: Normocephalic and atraumatic  Eyes:      General: No scleral icterus  Extraocular Movements: Extraocular movements intact  Cardiovascular:      Rate and Rhythm: Normal rate  Pulmonary:      Effort: Pulmonary effort is normal  No respiratory distress     Chest:      Comments: Declined breast exam  Abdominal:      General: There is no distension  Musculoskeletal:         General: Normal range of motion  Cervical back: Normal range of motion and neck supple  Neurological:      General: No focal deficit present  Mental Status: She is alert and oriented to person, place, and time  Gait: Gait normal           Imaging  Relevant imaging reviewed in chart    Labs  Relevant labs reviewed in chart   ASSESSMENT & PLAN      Diagnosis ICD-10-CM Associated Orders   1  Ductal carcinoma in situ (DCIS) of right breast  D05 11    2  Use of letrozole (Femara)  Z79 811    3  Post-menopausal  Z78 0    4  History of colon cancer, stage III  Z80 26        73 y o  postmenopausal female diagnosed with right breast DCIS with surgical and breast specific abnormal pathology history as detailed above  She has a history of Stage 3 colon cancer in 2015 managed in [place] with surgery and adjuvant chemotherapy   Oncology history updated accordingly this visit   Goals of care/patient communication  o Continue with endocrine therapy   TNM/Staging At Diagnosis  o pTis  o Stage 0   Disease Features/Tumor Markers/Genetics  o Notable Path Features: ER+IA+   Treatment  o Status post lumpectomy recommendation for adjuvant endocrine therapy for at least 5 years   Labs/Diagnostics  o DEXA scan for baseline bone mass assessment  o q1y mammogram with surgeon    Follow Up   6 months      All questions were answered to the patient's satisfaction during this encounter  They appreciated and thanked me for spending time with them  The patient knows the contact information for our office and know to reach out for any relevant concerns related to this encounter  For all other listed problems and medical diagnosis in his chart - they are managed by PCP and/or other specialists, which patient acknowledges  Mindy Buckner MD  Hematology & Medical Oncology

## 2022-09-14 ENCOUNTER — CLINICAL SUPPORT (OUTPATIENT)
Dept: RADIATION ONCOLOGY | Facility: CLINIC | Age: 72
End: 2022-09-14
Attending: RADIOLOGY
Payer: MEDICARE

## 2022-09-14 VITALS
TEMPERATURE: 97.4 F | DIASTOLIC BLOOD PRESSURE: 57 MMHG | BODY MASS INDEX: 27.71 KG/M2 | HEART RATE: 84 BPM | SYSTOLIC BLOOD PRESSURE: 121 MMHG | OXYGEN SATURATION: 100 % | WEIGHT: 151.5 LBS | RESPIRATION RATE: 16 BRPM

## 2022-09-14 DIAGNOSIS — D05.11 DUCTAL CARCINOMA IN SITU (DCIS) OF RIGHT BREAST: Primary | ICD-10-CM

## 2022-09-14 PROCEDURE — 99211 OFF/OP EST MAY X REQ PHY/QHP: CPT | Performed by: RADIOLOGY

## 2022-09-14 PROCEDURE — 99213 OFFICE O/P EST LOW 20 MIN: CPT | Performed by: RADIOLOGY

## 2022-09-14 NOTE — PROGRESS NOTES
Follow-up - Radiation Oncology   Annmarie Azul 1950 73 y o  female [id number]      History of Present Illness   Cancer Staging  No matching staging information was found for the patient  Annmarie Azul 1950 is a 73 y o  female with DCIS right breast successfully underwent lumpectomy in March repeated in April for clear margin   This was low-grade however the Dicsion RT shows an elevated score  She is maintained on letrozole  She finished adjuvant radiation therapy on 22 and presents today for follow up      She tolerated the treatments fairly well except for moderate skin reaction and minimal fatigue       22 Dr Portia Comer  Continue letrozole  No breast changes  Follow up 6 months        Upcomin23 Mammogram  23 Surgical Oncology  3/7/23 Medical Oncology           Interval History:  Doing well  Historical Information   Oncology History   Ductal carcinoma in situ (DCIS) of right breast   2022 Biopsy    Right breast stereotactic biopsy  1 o'clock  Breast tissue with focal atypical ductal hyperplasia, usual ductal hyperplasia, stromal fibrosis, and sclerosing adenosis with microcalcifications   Negative for in-situ or invasive carcinoma    Concordant  Biopsy clip is anterior to central aspect of architectural distortion  Brannon Geraldine reflector was intentionally targeted posterior to biopsy clip, midway between the distortion and the biopsy clip  3/1/2022 Surgery    Right breast vivek  directed lumpectomy  Ductal carcinoma in situ  4 4 cm  Margins negative - distance from DCIS to anterior margin is less than 1 mm, distance from DCIS to lateral margin less than 2 mm    ER 90  NM 5     3/1/2022 Initial Diagnosis    Ductal carcinoma in situ (DCIS) of right breast     3/14/2022 Genetic Testing    [facility]  A total of 36 genes were evaluated, including: JOLANTA, BRCA1, BRCA2, CDH1, CHEK2, PALB2, PTEN, STK11, TP53  Negative result   No pathogenic sequence variants or deletions/duplications identified     3/14/2022 Genomic Testing    DCISionRT   Addendum   SCORE 3 9      4/19/2022 Surgery    Right breast lumpectomy  Residual focus of DCIS cribriform pattern  Low nuclear grade  Superior margin is negative for tumor by 6 0 mm   The remaining surgical margins are negative for tumor by wider range     7/13/2022 - 8/11/2022 Radiation    Treatments:  Course: C1    Plan ID Energy Fractions Dose per Fraction (cGy) Dose Correction (cGy) Total Dose Delivered (cGy) Elapsed Days   R Breast 6X 16 / 16 266 0 4,256 22   R Breast e 9E 5 / 5 200 0 1,000 6      Treatment Dates:  7/13/2022 - 8/11/2022              Past Medical History:   Diagnosis Date    Anxiety     [facility] [facility]     [facility] cancer [facility] [address] ) 2015    chemo and surgery    Depression     Diabetes mellitus [facility] [address] [facility]     Ductal carcinoma in situ (DCIS) of right breast 03/10/2022    Hyperlipidemia     Hypertension     Skin cancer (melanoma) [facility] [address] [facility]      Past Surgical History:   Procedure Laterality Date    APPENDECTOMY      BACK SURGERY      BREAST BIOPSY Right 01/26/2022    right    BREAST LUMPECTOMY Right 3/1/2022    Procedure: RIGHT BREAST JOHN  DIRECTED LUMPECTOMY;  Surgeon: Vernon Smith MD;  [facility];  Service: Surgical Oncology    BREAST LUMPECTOMY Right 4/19/2022    Procedure: LUMPECTOMY;  Surgeon: Vernon Smith MD;  [facility];  Service: Surgical Oncology    CHOLECYSTECTOMY      COLON SURGERY      COLONOSCOPY      HYSTERECTOMY  2000    MAMMO NEEDLE LOCALIZATION LEFT (ALL INC) Right 2/24/2022    MAMMO STEREOTACTIC BREAST BIOPSY RIGHT (ALL INC) Right 1/26/2022    US GUIDED BREAST BIOPSY LEFT COMPLETE Left 1/26/2022       Social History   Social History     Substance and Sexual Activity   Alcohol Use Never     Social History     Substance and Sexual Activity   Drug Use Never     Social History     Tobacco Use   Smoking Status Current Every Day Smoker    Packs/day: 1 00    Years: 55 00    Pack years: 55 00    Types: Cigarettes    Start date: 1/1/1968   Smokeless Tobacco Never Used         Meds/Allergies     Current Outpatient Medications:     acetaminophen (TYLENOL) 650 mg CR tablet, Take 650 mg by mouth as needed for mild pain, Disp: , Rfl:     amLODIPine (NORVASC) 2 5 mg tablet, Take 2 5 mg by mouth daily  , Disp: , Rfl:     atorvastatin (LIPITOR) 20 mg tablet, Take 20 mg by mouth daily, Disp: , Rfl:     donepezil (ARICEPT) 10 mg tablet, Take 1 tablet (10 mg total) by mouth daily at bedtime, Disp: 30 tablet, Rfl: 5    fenofibrate (TRICOR) 145 mg tablet, Take 145 mg by mouth daily, Disp: , Rfl:     gabapentin (NEURONTIN) 600 MG tablet, Take 300 mg by mouth 2 (two) times a day , Disp: , Rfl:     glimepiride (AMARYL) 4 mg tablet, Take 4 mg by mouth daily with breakfast, Disp: , Rfl:     hydrochlorothiazide (HYDRODIURIL) 12 5 mg tablet, Take 12 5 mg by mouth daily, Disp: , Rfl:     HYDROcodone-acetaminophen (Norco) 5-325 mg per tablet, Take 1 tablet by mouth every 6 (six) hours as needed for pain, Disp: 10 tablet, Rfl: 0    irbesartan (AVAPRO) 300 mg tablet, Take 300 mg by mouth daily, Disp: , Rfl:     letrozole (FEMARA) 2 5 mg tablet, TAKE 1 TABLET BY MOUTH EVERY DAY, Disp: 90 tablet, Rfl: 1    Melatonin 10 MG TABS, Take 20 mg by mouth daily at bedtime as needed, Disp: , Rfl:     metFORMIN (GLUCOPHAGE) 1000 MG tablet, Take 1,000 mg by mouth 2 (two) times a day with meals  , Disp: , Rfl:     metoprolol succinate (TOPROL-XL) 50 mg 24 hr tablet, Take 75 mg by mouth daily, Disp: , Rfl:     omeprazole (PriLOSEC) 40 MG capsule, Take 40 mg by mouth daily, Disp: , Rfl:     oxybutynin (DITROPAN-XL) 5 mg 24 hr tablet, Take 5 mg by mouth daily, Disp: , Rfl:     primidone (MYSOLINE) 50 mg tablet, 1 tab at bedtime, Disp: 90 tablet, Rfl: 0    sitaGLIPtin (JANUVIA) 100 mg tablet, Take 100 mg by mouth daily, Disp: , Rfl:     oxyCODONE-acetaminophen (Percocet) 5-325 mg per tablet, Take 1 tablet by mouth every 6 (six) hours as needed for moderate pain Max Daily Amount: 4 tablets (Patient not taking: Reported on 9/14/2022), Disp: 20 tablet, Rfl: 0  Allergies   Allergen Reactions    Codeine GI Intolerance    Morphine GI Intolerance         Review of Systems   Constitutional: Negative for activity change, appetite change, fatigue, fever and unexpected weight change  HENT: Negative for congestion, sneezing and sore throat  Eyes: Negative  Respiratory: Negative for cough and shortness of breath  Cardiovascular: Negative for chest pain, palpitations and leg swelling  Gastrointestinal: Negative for abdominal pain, blood in stool, nausea and vomiting  Endocrine: Negative  Genitourinary: Negative for difficulty urinating, dysuria, flank pain, hematuria and vaginal bleeding  Musculoskeletal: Negative for arthralgias, back pain, gait problem and joint swelling  Skin: Negative  Allergic/Immunologic: Negative  Neurological: Negative for dizziness, weakness, numbness and headaches  Hematological: Negative  Psychiatric/Behavioral: Negative  OBJECTIVE:   /57   Pulse 84   Temp (!) 97 4 °F (36 3 °C)   Resp 16   Wt 68 7 kg (151 lb 8 oz)   LMP  (LMP Unknown)   SpO2 100%   BMI 27 71 kg/m²   Pain Assessment:  0  Karnofsky: 100 - Fully active, able to carry on all pre-disease performed without restriction    Physical Exam  Constitutional:       Appearance: Normal appearance  She is normal weight  HENT:      Nose: No congestion or rhinorrhea  Mouth/Throat:      Pharynx: Oropharynx is clear  Eyes:      Extraocular Movements: Extraocular movements intact  Pupils: Pupils are equal, round, and reactive to light  Cardiovascular:      Rate and Rhythm: Normal rate and regular rhythm  Heart sounds: Normal heart sounds  Pulmonary:      Effort: Pulmonary effort is normal       Breath sounds: Normal breath sounds     Abdominal: Palpations: Abdomen is soft  There is no mass  Tenderness: There is no abdominal tenderness  Musculoskeletal:         General: No swelling or tenderness  Normal range of motion  Cervical back: Normal range of motion and neck supple  Lymphadenopathy:      Cervical: No cervical adenopathy  Skin:     General: Skin is dry  Findings: No lesion or rash  Neurological:      General: No focal deficit present  Mental Status: She is alert and oriented to person, place, and time  Psychiatric:         Mood and Affect: Mood normal          Behavior: Behavior normal       Breast examination reveals no masses or swelling  The skin right breast has healed nicely  RESULTS    Lab Results: No results found for this or any previous visit (from the past 672 hour(s))  Imaging Studies:No results found  Assessment/Plan:  No orders of the defined types were placed in this encounter  Julita Collins is a 73y o  year old female with low-grade DCIS right breast with decision RT indicating moderate risk returns today in follow-up approximately 1 month after adjuvant radiation therapy following breast conservation surgery  She is also DIANN from colon cancer treated more than 7 years ago with surgery and chemotherapy  She is doing well and will continue with adjuvant hormone treatment  Will see her in 6 month follow-up  Betzy Saldana MD  9/14/2022,9:12 AM    Portions of the record may have been created with voice recognition software   Occasional wrong word or "sound a like" substitutions may have occurred due to the inherent limitations of voice recognition software   Read the chart carefully and recognize, using context, where substitutions have occurred

## 2022-09-14 NOTE — PROGRESS NOTES
Trupti Salazar 1950 is a 73 y o  female with DCIS right breast successfully underwent lumpectomy in March repeated in April for clear margin  This was low-grade however the Dicsion RT shows an elevated score  She is maintained on letrozole  She finished adjuvant radiation therapy on 22 and presents today for follow up  She tolerated the treatments fairly well except for moderate skin reaction and minimal fatigue  22 Dr Ramon Henry  Continue letrozole  No breast changes  Follow up 6 months      Upcomin23 Mammogram  23 Surgical Oncology  3/7/23 Medical Oncology      Follow up visit     Oncology History Overview Note   with DCIS right breast successfully underwent lumpectomy in March repeated in April for clear margin  This was low-grade however the Dicsion RT shows an elevated score  She is maintained on letrozole  She finished adjuvant radiation therapy on 22 and presents today for follow up  She tolerated the treatments fairly well except for moderate skin reaction and minimal fatigue  22 Dr Ramon Henry  Continue letrozole  No breast changes  Follow up 6 months      Upcomin23 Mammogram  23 Surgical Oncology  3/7/23 Medical Oncology     Ductal carcinoma in situ (DCIS) of right breast   2022 Biopsy    Right breast stereotactic biopsy  1 o'clock  Breast tissue with focal atypical ductal hyperplasia, usual ductal hyperplasia, stromal fibrosis, and sclerosing adenosis with microcalcifications   Negative for in-situ or invasive carcinoma    Concordant  Biopsy clip is anterior to central aspect of architectural distortion  Jcarlos Rodriguez reflector was intentionally targeted posterior to biopsy clip, midway between the distortion and the biopsy clip        3/1/2022 Surgery    Right breast vivek  directed lumpectomy  Ductal carcinoma in situ  4 4 cm  Margins negative - distance from DCIS to anterior margin is less than 1 mm, distance from DCIS to lateral margin less than 2 mm    ER 90  AR 5     3/1/2022 Initial Diagnosis    Ductal carcinoma in situ (DCIS) of right breast     3/14/2022 Genetic Testing    [facility]  A total of 36 genes were evaluated, including: JOLANTA, BRCA1, BRCA2, CDH1, CHEK2, PALB2, PTEN, STK11, TP53  Negative result  No pathogenic sequence variants or deletions/duplications identified     3/14/2022 Genomic Testing    DCISionRT   Addendum   SCORE 3 9      4/19/2022 Surgery    Right breast lumpectomy  Residual focus of DCIS cribriform pattern  Low nuclear grade  Superior margin is negative for tumor by 6 0 mm   The remaining surgical margins are negative for tumor by wider range     7/13/2022 - 8/11/2022 Radiation    Treatments:  Course: C1    Plan ID Energy Fractions Dose per Fraction (cGy) Dose Correction (cGy) Total Dose Delivered (cGy) Elapsed Days   R Breast 6X 16 / 16 266 0 4,256 22   R Breast e 9E 5 / 5 200 0 1,000 6      Treatment Dates:  7/13/2022 - 8/11/2022  Review of Systems:  Review of Systems   Constitutional: Negative  HENT: Negative  Eyes: Negative  Respiratory: Negative  Cardiovascular: Negative  Gastrointestinal: Positive for diarrhea  Endocrine: Negative  Genitourinary: Negative  Musculoskeletal: Positive for arthralgias and back pain  Skin: Negative  Allergic/Immunologic: Negative  Neurological: Positive for light-headedness  Hematological: Negative  Psychiatric/Behavioral: Negative          Clinical Trial: no    Teaching    Covid Vaccine Status    Health Maintenance   Topic Date Due    Medicare Annual Wellness Visit (AWV)  Never done    Diabetic Foot Exam  Never done    DM Eye Exam  Never done    BMI: Followup Plan  Never done    Osteoporosis Screening  Never done    Lung Cancer Screening  Never done    Fall Risk  Never done    Urinary Incontinence Screening  Never done    COVID-19 Vaccine (3 - Moderna risk series) 06/29/2021    Pneumococcal Vaccine: 65+ Years (2 - PPSV23 or PCV20) 09/13/2022    Influenza Vaccine (1) 09/01/2022    Breast Cancer Screening: Mammogram  11/16/2022    HEMOGLOBIN A1C  12/21/2022    Depression Screening  06/21/2023    BMI: Adult  09/06/2023    Hepatitis C Screening  Completed    HIB Vaccine  Aged Out    Hepatitis B Vaccine  Aged Out    IPV Vaccine  Aged Out    Hepatitis A Vaccine  Aged Out    Meningococcal ACWY Vaccine  Aged Out    HPV Vaccine  Aged Out     Patient Active Problem List   Diagnosis    Tremor    Memory difficulty    Breast fibroadenoma in female, left    Atypical ductal hyperplasia of right breast    Ductal carcinoma in situ (DCIS) of right breast    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 [facility] [address] [facility]    Generalized anxiety disorder    Hypertension associated with diabetes ([facility] [address] [facility]    Stage 3a chronic kidney disease ([facility] [address] [facility]    Gastroesophageal reflux disease    Current every day smoker    Post-menopausal    History of colon cancer, stage III    Use of letrozole (Femara)     Past Medical History:   Diagnosis Date    Anxiety     Breast cancer ([facility] [address] [facility]     Colon cancer ([facility] [address] ) 2015    chemo and surgery    Depression     Diabetes mellitus ([facility] [address] [facility]     Ductal carcinoma in situ (DCIS) of right breast 03/10/2022    Hyperlipidemia     Hypertension     Skin cancer (melanoma) ([facility] [address] [facility]      Past Surgical History:   Procedure Laterality Date    APPENDECTOMY      BACK SURGERY      BREAST BIOPSY Right 01/26/2022    right    BREAST LUMPECTOMY Right 3/1/2022    Procedure: RIGHT BREAST JOHN  DIRECTED LUMPECTOMY;  Surgeon: Bebe Sethi MD;  [facility];  Service: Surgical Oncology    BREAST LUMPECTOMY Right 4/19/2022    Procedure: LUMPECTOMY;  Surgeon: Bebe Sethi MD;  [facility];  Service: Surgical Oncology    CHOLECYSTECTOMY      COLON SURGERY      COLONOSCOPY      HYSTERECTOMY  2000    MAMMO NEEDLE LOCALIZATION LEFT (ALL INC) Right 2/24/2022    MAMMO STEREOTACTIC BREAST BIOPSY RIGHT (ALL INC) Right 1/26/2022    US GUIDED BREAST BIOPSY LEFT COMPLETE Left 1/26/2022     Family History   Problem Relation Age of Onset    No Known Problems Mother     No Known Problems Father     No Known Problems Sister     Lung cancer Brother     No Known Problems Maternal Grandmother     No Known Problems Maternal Grandfather     No Known Problems Paternal Grandmother     No Known Problems Paternal Grandfather     No Known Problems Daughter     No Known Problems Maternal Aunt     No Known Problems Paternal Aunt     Other Paternal Aunt         oral cancer    Breast cancer Paternal Aunt         age unknown   Chance Rodarte Breast cancer Paternal [de-identified]         age unknown   Chance Rodarte Breast cancer Cousin         age unknown    Stomach cancer Paternal Uncle     No Known Problems Son     No Known Problems Son      Social History     Socioeconomic History    Marital status:      Spouse name: Not on file    Number of children: Not on file    Years of education: Not on file    Highest education level: Not on file   Occupational History    Not on file   Tobacco Use    Smoking status: Current Every Day Smoker     Packs/day: 1 00     Years: 55 00     Pack years: 55 00     Types: Cigarettes     Start date: 1/1/1968    Smokeless tobacco: Never Used   Vaping Use    Vaping Use: Never used   Substance and Sexual Activity    Alcohol use: Never    Drug use: Never    Sexual activity: Not Currently   Other Topics Concern    Not on file   Social History Narrative    Not on file     Social Determinants of Health     Financial Resource Strain: Not on file   Food Insecurity: Not on file   Transportation Needs: Not on file   Physical Activity: Not on file   Stress: Not on file   Social Connections: Not on file   Intimate Partner Violence: Not on file   Housing Stability: Not on file       Current Outpatient Medications:     acetaminophen (TYLENOL) 650 mg CR tablet, Take 650 mg by mouth as needed for mild pain, Disp: , Rfl:     amLODIPine (NORVASC) 2 5 mg tablet, Take 2 5 mg by mouth daily  , Disp: , Rfl:     atorvastatin (LIPITOR) 20 mg tablet, Take 20 mg by mouth daily, Disp: , Rfl:     donepezil (ARICEPT) 10 mg tablet, Take 1 tablet (10 mg total) by mouth daily at bedtime, Disp: 30 tablet, Rfl: 5    fenofibrate (TRICOR) 145 mg tablet, Take 145 mg by mouth daily, Disp: , Rfl:     gabapentin (NEURONTIN) 600 MG tablet, Take 300 mg by mouth 2 (two) times a day , Disp: , Rfl:     glimepiride (AMARYL) 4 mg tablet, Take 4 mg by mouth daily with breakfast, Disp: , Rfl:     hydrochlorothiazide (HYDRODIURIL) 12 5 mg tablet, Take 12 5 mg by mouth daily, Disp: , Rfl:     HYDROcodone-acetaminophen (Norco) 5-325 mg per tablet, Take 1 tablet by mouth every 6 (six) hours as needed for pain, Disp: 10 tablet, Rfl: 0    irbesartan (AVAPRO) 300 mg tablet, Take 300 mg by mouth daily, Disp: , Rfl:     letrozole (FEMARA) 2 5 mg tablet, TAKE 1 TABLET BY MOUTH EVERY DAY, Disp: 90 tablet, Rfl: 1    Melatonin 10 MG TABS, Take 20 mg by mouth daily at bedtime as needed, Disp: , Rfl:     metFORMIN (GLUCOPHAGE) 1000 MG tablet, Take 1,000 mg by mouth 2 (two) times a day with meals  , Disp: , Rfl:     metoprolol succinate (TOPROL-XL) 50 mg 24 hr tablet, Take 75 mg by mouth daily, Disp: , Rfl:     omeprazole (PriLOSEC) 40 MG capsule, Take 40 mg by mouth daily, Disp: , Rfl:     oxybutynin (DITROPAN-XL) 5 mg 24 hr tablet, Take 5 mg by mouth daily, Disp: , Rfl:     primidone (MYSOLINE) 50 mg tablet, 1 tab at bedtime, Disp: 90 tablet, Rfl: 0    sitaGLIPtin (JANUVIA) 100 mg tablet, Take 100 mg by mouth daily, Disp: , Rfl:     oxyCODONE-acetaminophen (Percocet) 5-325 mg per tablet, Take 1 tablet by mouth every 6 (six) hours as needed for moderate pain Max Daily Amount: 4 tablets (Patient not taking: Reported on 9/14/2022), Disp: 20 tablet, Rfl: 0  Allergies   Allergen Reactions    Codeine GI Intolerance    Morphine GI Intolerance Vitals:    09/14/22 0900   BP: 121/57   Pulse: 84   Resp: 16   Temp: (!) 97 4 °F (36 3 °C)   SpO2: 100%   Weight: 68 7 kg (151 lb 8 oz)      Pain Score:   6

## 2022-09-16 ENCOUNTER — TELEPHONE (OUTPATIENT)
Dept: NEUROLOGY | Facility: CLINIC | Age: 72
End: 2022-09-16

## 2022-09-16 NOTE — TELEPHONE ENCOUNTER
ADD-ON: Scheduled 9/20/22 at 12pm with Dr Gauri Castro in [facility]  Provided appointment details  Insurance E-Verified

## 2022-09-20 ENCOUNTER — OFFICE VISIT (OUTPATIENT)
Dept: NEUROLOGY | Facility: CLINIC | Age: 72
End: 2022-09-20
Payer: COMMERCIAL

## 2022-09-20 VITALS
DIASTOLIC BLOOD PRESSURE: 68 MMHG | SYSTOLIC BLOOD PRESSURE: 110 MMHG | HEIGHT: 62 IN | OXYGEN SATURATION: 99 % | BODY MASS INDEX: 27.71 KG/M2 | HEART RATE: 67 BPM

## 2022-09-20 DIAGNOSIS — R41.3 MEMORY DIFFICULTY: Primary | ICD-10-CM

## 2022-09-20 DIAGNOSIS — E08.21 DIABETES DUE TO UNDERLYING CONDITION W DIABETIC NEPHROPATHY (HCC): ICD-10-CM

## 2022-09-20 DIAGNOSIS — I15.2 HYPERTENSION ASSOCIATED WITH DIABETES (HCC): ICD-10-CM

## 2022-09-20 DIAGNOSIS — E11.59 HYPERTENSION ASSOCIATED WITH DIABETES (HCC): ICD-10-CM

## 2022-09-20 DIAGNOSIS — R25.1 TREMOR: ICD-10-CM

## 2022-09-20 DIAGNOSIS — F41.1 GENERALIZED ANXIETY DISORDER: ICD-10-CM

## 2022-09-20 PROCEDURE — 99215 OFFICE O/P EST HI 40 MIN: CPT | Performed by: PSYCHIATRY & NEUROLOGY

## 2022-09-20 NOTE — PROGRESS NOTES
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 ([facility] )    5  Generalized anxiety disorder        Plan:  Continue with Aricept 10 mg a day  Continue with mentally stimulating exercises  Continue with primidone 50 mg at nighttime  Patient advised to discuss with family physician and see if her gabapentin can be decreased, if he still continues to have tremor they will call me and then we could consider increasing the primidone to 50 mg twice a day  Patient was strongly advised to quit smoking  To take fall and safety precautions  To be under constant supervision and do regular exercises and eat a healthy diet  Keep her blood pressure cholesterol and sugar under control, to go to the hospital if has any worsening symptoms and call me otherwise to see me back in 4 months and follow up with other physicians  Subjective:    HPI   Patient is here accompanied with her son in follow-up for her tremor and memory difficulty for the last couple of years, since her last visit her memory seems to be stable her [place] is slightly better than last time is 27/30 it was 24/30 on the prior visit, she tells me that she still continues to have intentional tremor especially when she is eating no resting tremor no difficulty in swallowing no early morning stiffness no freezing episodes she does feel lightheaded and she is careful when she gets up from a chair she is on gabapentin 300 mg twice a day which is being managed by her family physician for diabetic neuropathy she continues to smoke a pack to a pack and half a day her sleep is good no bowel and bladder incontinence appetite is good weight has been stable, no other complaints      Vitals:    09/20/22 1155   BP: 110/68   BP Location: Right arm   Patient Position: Sitting   Cuff Size: Adult   Pulse: 67   SpO2: 99%   Height: 5' 2" (1 575 m)       Current Medications    Current Outpatient Medications:     acetaminophen (TYLENOL) 650 mg CR tablet, Take 650 mg by mouth as needed for mild pain, Disp: , Rfl:     amLODIPine (NORVASC) 2 5 mg tablet, Take 2 5 mg by mouth daily  , Disp: , Rfl:     atorvastatin (LIPITOR) 20 mg tablet, Take 20 mg by mouth daily, Disp: , Rfl:     donepezil (ARICEPT) 10 mg tablet, Take 1 tablet (10 mg total) by mouth daily at bedtime, Disp: 30 tablet, Rfl: 5    fenofibrate (TRICOR) 145 mg tablet, Take 145 mg by mouth daily, Disp: , Rfl:     gabapentin (NEURONTIN) 600 MG tablet, Take 300 mg by mouth 2 (two) times a day , Disp: , Rfl:     glimepiride (AMARYL) 4 mg tablet, Take 4 mg by mouth daily with breakfast, Disp: , Rfl:     hydrochlorothiazide (HYDRODIURIL) 12 5 mg tablet, Take 12 5 mg by mouth daily, Disp: , Rfl:     irbesartan (AVAPRO) 300 mg tablet, Take 300 mg by mouth daily, Disp: , Rfl:     letrozole (FEMARA) 2 5 mg tablet, TAKE 1 TABLET BY MOUTH EVERY DAY, Disp: 90 tablet, Rfl: 1    Melatonin 10 MG TABS, Take 20 mg by mouth daily at bedtime as needed, Disp: , Rfl:     metFORMIN (GLUCOPHAGE) 1000 MG tablet, Take 1,000 mg by mouth 2 (two) times a day with meals  , Disp: , Rfl:     metoprolol succinate (TOPROL-XL) 50 mg 24 hr tablet, Take 75 mg by mouth daily, Disp: , Rfl:     omeprazole (PriLOSEC) 40 MG capsule, Take 40 mg by mouth daily, Disp: , Rfl:     oxybutynin (DITROPAN-XL) 5 mg 24 hr tablet, Take 5 mg by mouth daily, Disp: , Rfl:     primidone (MYSOLINE) 50 mg tablet, 1 tab at bedtime, Disp: 90 tablet, Rfl: 0    sitaGLIPtin (JANUVIA) 100 mg tablet, Take 100 mg by mouth daily, Disp: , Rfl:     HYDROcodone-acetaminophen (Norco) 5-325 mg per tablet, Take 1 tablet by mouth every 6 (six) hours as needed for pain (Patient not taking: Reported on 9/20/2022), Disp: 10 tablet, Rfl: 0    oxyCODONE-acetaminophen (Percocet) 5-325 mg per tablet, Take 1 tablet by mouth every 6 (six) hours as needed for moderate pain Max Daily Amount: 4 tablets (Patient not taking: Reported on 9/14/2022), Disp: 20 tablet, Rfl: 0      Allergies  Codeine and Morphine    Past Medical History  Past Medical History:   Diagnosis Date    Anxiety     [facility] ([facility] )     Colon cancer ([place] [address] ) 2015    chemo and surgery    Depression     Diabetes mellitus ([place] [address] )     Ductal carcinoma in situ (DCIS) of right breast 03/10/2022    Hyperlipidemia     Hypertension     Skin cancer (melanoma) ([place] [address] )          Past Surgical History:  Past Surgical History:   Procedure Laterality Date    APPENDECTOMY      BACK SURGERY      BREAST BIOPSY Right 01/26/2022    right    BREAST LUMPECTOMY Right 3/1/2022    Procedure: RIGHT BREAST JOHN  DIRECTED LUMPECTOMY;  Surgeon: Brenden Mccartney MD;  [facility];  Service: Surgical Oncology    BREAST LUMPECTOMY Right 4/19/2022    Procedure: LUMPECTOMY;  Surgeon: Brenden Mccartney MD;  [facility];  Service: Surgical Oncology    CHOLECYSTECTOMY      COLON SURGERY      COLONOSCOPY      HYSTERECTOMY  2000    MAMMO NEEDLE LOCALIZATION LEFT (ALL INC) Right 2/24/2022    MAMMO STEREOTACTIC BREAST BIOPSY RIGHT (ALL INC) Right 1/26/2022    US GUIDED BREAST BIOPSY LEFT COMPLETE Left 1/26/2022         Family History:  Family History   Problem Relation Age of Onset    No Known Problems Mother     No Known Problems Father     No Known Problems Sister     Lung cancer Brother     No Known Problems Maternal Grandmother     No Known Problems Maternal Grandfather     No Known Problems Paternal Grandmother     No Known Problems Paternal Grandfather     No Known Problems Daughter     No Known Problems Maternal Aunt     No Known Problems Paternal [de-identified]     Other Paternal Aunt         oral cancer   Yari Leisure Breast cancer Paternal [de-identified]         age unknown   Yari Leisure Breast cancer Paternal Aunt         age unknown    Breast cancer Cousin         age unknown    Stomach cancer Paternal Uncle     No Known Problems Son     No Known Problems Son        Social History:   reports that she has been smoking cigarettes  She started smoking about 54 years ago  She has a 55 00 pack-year smoking history  She has never used smokeless tobacco  She reports that she does not drink alcohol and does not use drugs  I have reviewed the past medical history, surgical history, social and family history, current medications, allergies vitals, review of systems, and updated this information as appropriate today  Objective:    Physical Exam    Neurological Exam    GENERAL:  Cooperative in no acute distress  Well-developed and well-nourished    HEAD and NECK   Head is atraumatic normocephalic with no lesions or masses  Neck is supple with full range of motion    CARDIOVASCULAR  Carotid Arteries-no carotid bruits  NEUROLOGIC:  Mental Status-the patient is awake alert and oriented without aphasia or apraxia, [place] is 27/30  Cranial Nerves: Visual fields are full to confrontation  Extraocular movements are full without nystagmus  Pupils are 2-1/2 mm and reactive  Face is symmetrical to light touch  Movements of facial expression move symmetrically  Hearing is normal to finger rub bilaterally  Soft palate lifts symmetrically  Shoulder shrug is symmetrical  Tongue is midline without atrophy  Motor: No drift is noted on arm extension  Strength is full in the upper and lower extremities with normal bulk and tone  Patient has mild tremor on outstretched hands, resting tremor  Gait is unremarkable  ROS:  Review of Systems   Constitutional: Negative  Negative for appetite change and fever  HENT: Negative  Negative for hearing loss, tinnitus, trouble swallowing and voice change  Eyes: Negative  Negative for photophobia and pain  Respiratory: Negative  Negative for shortness of breath  Cardiovascular: Negative  Negative for palpitations  Gastrointestinal: Negative  Negative for nausea and vomiting  Endocrine: Negative  Negative for cold intolerance  Genitourinary: Negative  Negative for dysuria, frequency and urgency  Musculoskeletal: Negative  Negative for myalgias and neck pain  Skin: Negative  Negative for rash  Neurological: Positive for tremors and light-headedness  Negative for dizziness, seizures, syncope, facial asymmetry, speech difficulty, weakness, numbness and headaches  Patient states tremors in hands  Hematological: Negative  Does not bruise/bleed easily  Psychiatric/Behavioral: Negative  Negative for confusion, hallucinations and sleep disturbance

## 2022-09-22 DIAGNOSIS — R41.3 MEMORY DIFFICULTY: ICD-10-CM

## 2022-09-22 RX ORDER — DONEPEZIL HYDROCHLORIDE 10 MG/1
TABLET, FILM COATED ORAL
Qty: 90 TABLET | Refills: 1 | Status: SHIPPED | OUTPATIENT
Start: 2022-09-22

## 2022-10-04 ENCOUNTER — HOSPITAL ENCOUNTER (OUTPATIENT)
Dept: BONE DENSITY | Facility: CLINIC | Age: 72
Discharge: HOME | End: 2022-10-04
Payer: COMMERCIAL

## 2022-10-04 DIAGNOSIS — D05.11 DUCTAL CARCINOMA IN SITU (DCIS) OF RIGHT BREAST: ICD-10-CM

## 2022-10-04 DIAGNOSIS — Z78.0 POST-MENOPAUSAL: ICD-10-CM

## 2022-10-04 PROCEDURE — 77080 DXA BONE DENSITY AXIAL: CPT

## 2022-11-11 DIAGNOSIS — R25.1 TREMOR: ICD-10-CM

## 2022-11-11 RX ORDER — PRIMIDONE 50 MG/1
TABLET ORAL
Qty: 90 TABLET | Refills: 1 | Status: SHIPPED | OUTPATIENT
Start: 2022-11-11

## 2022-12-05 ENCOUNTER — HOSPITAL ENCOUNTER (EMERGENCY)
Facility: HOSPITAL | Age: 72
Discharge: HOME | End: 2022-12-05
Attending: EMERGENCY MEDICINE

## 2022-12-05 ENCOUNTER — APPOINTMENT (EMERGENCY)
Dept: RADIOLOGY | Facility: HOSPITAL | Age: 72
End: 2022-12-05

## 2022-12-05 VITALS
SYSTOLIC BLOOD PRESSURE: 149 MMHG | TEMPERATURE: 97.9 F | RESPIRATION RATE: 18 BRPM | OXYGEN SATURATION: 97 % | DIASTOLIC BLOOD PRESSURE: 67 MMHG | HEART RATE: 53 BPM
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
## 2022-12-05 DIAGNOSIS — S93.402A LEFT ANKLE SPRAIN: ICD-10-CM

## 2022-12-05 LAB
ATRIAL RATE: 56 BPM
GLUCOSE SERPL-MCNC: 204 MG/DL (ref 65–140)
GLUCOSE SERPL-MCNC: 21 MG/DL (ref 65–140)
GLUCOSE SERPL-MCNC: 23 MG/DL (ref 65–140)
P AXIS: 72 DEGREES
PR INTERVAL: 226 MS
QRS AXIS: 56 DEGREES
QRSD INTERVAL: 108 MS
QT INTERVAL: 432 MS
QTC INTERVAL: 416 MS
T WAVE AXIS: 72 DEGREES
VENTRICULAR RATE: 56 BPM

## 2022-12-05 RX ORDER — KETOROLAC TROMETHAMINE 30 MG/ML
1 INJECTION, SOLUTION INTRAMUSCULAR; INTRAVENOUS ONCE
Status: COMPLETED | OUTPATIENT
Start: 2022-12-05 | End: 2022-12-05

## 2022-12-05 RX ORDER — DEXTROSE MONOHYDRATE 25 G/50ML
50 INJECTION, SOLUTION INTRAVENOUS ONCE
Status: COMPLETED | OUTPATIENT
Start: 2022-12-05 | End: 2022-12-05

## 2022-12-05 RX ORDER — DEXTROSE MONOHYDRATE 25 G/50ML
INJECTION, SOLUTION INTRAVENOUS
Status: COMPLETED
Start: 2022-12-05 | End: 2022-12-05

## 2022-12-05 RX ADMIN — DEXTROSE MONOHYDRATE 50 ML: 25 INJECTION, SOLUTION INTRAVENOUS at 14:04

## 2022-12-05 NOTE — ED PROVIDER NOTES
History  Chief Complaint   Patient presents with   • Fall     Pt reports falling today when she slipped on the ice this morning and rolled left ankle  Pt denies LOC, bloodthinners, or hitting head  Eli Jimenez is a 81year-old female arriving by EMS for evaluation with chief complaint of left ankle pain in setting of mechanical fall this morning  The patient states that she had slipped, presumably on black ice on the sidewalk outside of her apartment, during which time she had inverted her left ankle which resulted in acute onset of pain and swelling to the lateral aspect of the ankle joint  No head strike or loss of consciousness  Patient notes that she has been able to bear weight onto the left lower extremity since the fall  She denies any extremity paresthesias or weakness, as well as any prior injuries to the left ankle  She denies pain or injury elsewhere, and offers no other complaints or concerns at this time  Prior to Admission Medications   Prescriptions Last Dose Informant Patient Reported? Taking?    HYDROcodone-acetaminophen (Norco) 5-325 mg per tablet   No No   Sig: Take 1 tablet by mouth every 6 (six) hours as needed for pain   Patient not taking: Reported on 9/20/2022   Melatonin 10 MG TABS   Yes No   Sig: Take 20 mg by mouth daily at bedtime as needed   acetaminophen (TYLENOL) 650 mg CR tablet   Yes No   Sig: Take 650 mg by mouth as needed for mild pain   amLODIPine (NORVASC) 2 5 mg tablet   Yes No   Sig: Take 2 5 mg by mouth daily     atorvastatin (LIPITOR) 20 mg tablet   Yes No   Sig: Take 20 mg by mouth daily   donepezil (ARICEPT) 10 mg tablet   No No   Sig: TAKE 1 TABLET BY MOUTH DAILY AT BEDTIME   fenofibrate (TRICOR) 145 mg tablet   Yes No   Sig: Take 145 mg by mouth daily   gabapentin (NEURONTIN) 600 MG tablet   Yes No   Sig: Take 300 mg by mouth 2 (two) times a day    glimepiride (AMARYL) 4 mg tablet   Yes No   Sig: Take 4 mg by mouth daily with breakfast hydrochlorothiazide (HYDRODIURIL) 12 5 mg tablet   Yes No   Sig: Take 12 5 mg by mouth daily   irbesartan (AVAPRO) 300 mg tablet   Yes No   Sig: Take 300 mg by mouth daily   letrozole (FEMARA) 2 5 mg tablet   No No   Sig: TAKE 1 TABLET BY MOUTH EVERY DAY   metFORMIN (GLUCOPHAGE) 1000 MG tablet   Yes No   Sig: Take 1,000 mg by mouth 2 (two) times a day with meals     metoprolol succinate (TOPROL-XL) 50 mg 24 hr tablet   Yes No   Sig: Take 75 mg by mouth daily   omeprazole (PriLOSEC) 40 MG capsule   Yes No   Sig: Take 40 mg by mouth daily   oxyCODONE-acetaminophen (Percocet) 5-325 mg per tablet   No No   Sig: Take 1 tablet by mouth every 6 (six) hours as needed for moderate pain Max Daily Amount: 4 tablets   Patient not taking: Reported on 2022   oxybutynin (DITROPAN-XL) 5 mg 24 hr tablet   Yes No   Sig: Take 5 mg by mouth daily   primidone (MYSOLINE) 50 mg tablet   No No   Si tab at bedtime   sitaGLIPtin (JANUVIA) 100 mg tablet   Yes No   Sig: Take 100 mg by mouth daily      Facility-Administered Medications: None       Past Medical History:   Diagnosis Date   • Anxiety    • Breast cancer (HCC)    • Colon cancer ([facility] ) 2015    chemo and surgery   • Depression    • Diabetes mellitus ([facility] )    • Ductal carcinoma in situ (DCIS) of right breast 03/10/2022   • Hyperlipidemia    • Hypertension    • Skin cancer (melanoma) ([facility] )        Past Surgical History:   Procedure Laterality Date   • APPENDECTOMY     • BACK SURGERY     • BREAST BIOPSY Right 2022    right   • BREAST LUMPECTOMY Right 3/1/2022    Procedure: RIGHT BREAST JOHN  DIRECTED LUMPECTOMY;  Surgeon: Kassandra Zhang MD;  [facility];  Service: Surgical Oncology   • BREAST LUMPECTOMY Right 2022    Procedure: LUMPECTOMY;  Surgeon: Kassandra Zhang MD;  [facility];  Service: Surgical Oncology   • CHOLECYSTECTOMY     • COLON SURGERY     • COLONOSCOPY     • HYSTERECTOMY     • MAMMO NEEDLE LOCALIZATION LEFT (ALL INC) Right 2/24/2022   • MAMMO STEREOTACTIC BREAST BIOPSY RIGHT (ALL INC) Right 1/26/2022   • US GUIDED BREAST BIOPSY LEFT COMPLETE Left 1/26/2022       Family History   Problem Relation Age of Onset   • No Known Problems Mother    • No Known Problems Father    • No Known Problems Sister    • Lung cancer Brother    • No Known Problems Maternal Grandmother    • No Known Problems Maternal Grandfather    • No Known Problems Paternal Grandmother    • No Known Problems Paternal Grandfather    • No Known Problems Daughter    • No Known Problems Maternal Aunt    • No Known Problems Paternal Aunt    • Other Paternal Aunt         oral cancer   • Breast cancer Paternal Aunt         age unknown   • Breast cancer Paternal [de-identified]         age unknown   • Breast cancer Cousin         age unknown   • Stomach cancer Paternal Uncle    • No Known Problems Son    • No Known Problems Son      I have reviewed and agree with the history as documented  E-Cigarette/Vaping   • E-Cigarette Use Never User      E-Cigarette/Vaping Substances   • Nicotine No    • THC No    • CBD No    • Flavoring No    • Other No    • Unknown No      Social History     Tobacco Use   • Smoking status: Every Day     Packs/day: 1 00     Years: 55 00     Pack years: 55 00     Types: Cigarettes     Start date: 1/1/1968   • Smokeless tobacco: Never   Vaping Use   • Vaping Use: Never used   Substance Use Topics   • Alcohol use: Never   • Drug use: Never       Review of Systems   Constitutional: Negative for diaphoresis and fatigue  Musculoskeletal: Positive for arthralgias, joint swelling and myalgias  Negative for gait problem  Skin: Negative for rash and wound  Neurological: Negative for weakness and numbness  All other systems reviewed and are negative  Physical Exam  Physical Exam  Vitals and nursing note reviewed  Constitutional:       General: She is not in acute distress  Appearance: Normal appearance   She is not ill-appearing, toxic-appearing or diaphoretic  HENT:      Head: Normocephalic and atraumatic  Right Ear: External ear normal       Left Ear: External ear normal    Eyes:      Conjunctiva/sclera: Conjunctivae normal    Cardiovascular:      Rate and Rhythm: Normal rate  Pulmonary:      Effort: Pulmonary effort is normal    Musculoskeletal:         General: Normal range of motion  Cervical back: Normal range of motion and neck supple  Comments: Left lower extremity: No obvious deformity on inspection of the left ankle joint  Mild edema overlying the lateral malleolus with tenderness upon palpation  No bony deformity or step-off with palpation  Decreased range of motion secondary to pain  Distal sensation and pulses intact  Skin:     General: Skin is warm and dry  Capillary Refill: Capillary refill takes less than 2 seconds  Neurological:      Mental Status: She is alert and oriented to person, place, and time  Mental status is at baseline  GCS: GCS eye subscore is 4  GCS verbal subscore is 5  GCS motor subscore is 6  Psychiatric:         Mood and Affect: Mood normal          Vital Signs  ED Triage Vitals [12/05/22 1200]   Temperature Pulse Respirations Blood Pressure SpO2   97 9 °F (36 6 °C) (!) 53 18 149/67 98 %      Temp Source Heart Rate Source Patient Position - Orthostatic VS BP Location FiO2 (%)   Oral Monitor -- -- --      Pain Score       --           Vitals:    12/05/22 1200   BP: 149/67   Pulse: (!) 53         Visual Acuity  Visual Acuity    Flowsheet Row Most Recent Value   L Pupil Size (mm) 3   R Pupil Size (mm) 3          ED Medications  Medications   ketorolac (FOR EMS ONLY) (TORADOL) injection 30 mg (0 mg Does not apply Given to EMS 12/5/22 1212)       Diagnostic Studies  Results Reviewed     None                 XR ankle 3+ views LEFT   Final Result by Malena Dixon MD (12/05 4837)   Heel spurs      No acute osseous abnormality              Workstation performed: [id number]                    Procedures  Procedures         ED Course                               SBIRT 20yo+    Flowsheet Row Most Recent Value   SBIRT (25 yo +)    In order to provide better care to our patients, we are screening all of our patients for alcohol and drug use  Would it be okay to ask you these screening questions? Yes Filed at: 12/05/2022 1206   Initial Alcohol Screen: US AUDIT-C     1  How often do you have a drink containing alcohol? 0 Filed at: 12/05/2022 1206   2  How many drinks containing alcohol do you have on a typical day you are drinking? 0 Filed at: 12/05/2022 1206   3a  Male UNDER 65: How often do you have five or more drinks on one occasion? 0 Filed at: 12/05/2022 1206   3b  FEMALE Any Age, or MALE 65+: How often do you have 4 or more drinks on one occassion? 0 Filed at: 12/05/2022 1206   Audit-C Score 0 Filed at: 12/05/2022 1206   DIANA: How many times in the past year have you    Used an illegal drug or used a prescription medication for non-medical reasons? Never Filed at: 12/05/2022 1206                    MDM  Number of Diagnoses or Management Options  Fall, initial encounter: new and requires workup  Left ankle sprain: new and requires workup  Diagnosis management comments: MDM: 30-year-old female arriving by EMS for evaluation of left ankle pain in setting of recent injury  Patient states that she was walking outside and presumably slipped on black ice, inverting her left ankle  This had resulted in pain and swelling to the lateral malleolus  Patient able to bear weight onto the left lower extremity after the injury  There was no head strike or loss of consciousness, or use of anticoagulant medications  Vital signs are stable on hospital presentation  She is neurovascularly intact on exam   An x-ray was obtained to evaluate for any acute osseous abnormality which is negative  Suspect underlying ligamentous injury  Test results reviewed with patient at bedside  Reviewed supportive care to include rest, ice, and elevation  Patient given Air Cast for compression and assistance with ambulation  Patient able to ambulate independently in the ED with stable and steady gait observed  Recommended Tylenol and ibuprofen for pain control  Encouraged outpatient orthopedic follow-up for further evaluation and management if symptoms persist and parameters for ED return discussed at length  The patient verbalized understanding and was comfortable and agreeable with disposition and care plan  She was discharged in stable condition and did ambulate independently from the emergency department today without issue  Amount and/or Complexity of Data Reviewed  Tests in the radiology section of CPT®: ordered and reviewed  Review and summarize past medical records: yes  Independent visualization of images, tracings, or specimens: yes    Risk of Complications, Morbidity, and/or Mortality  Presenting problems: low  Diagnostic procedures: low  Management options: low    Patient Progress  Patient progress: stable      Disposition  Final diagnoses:   Fall, initial encounter   Left ankle sprain     Time reflects when diagnosis was documented in both MDM as applicable and the Disposition within this note     Time User Action Codes Description Comment    12/5/2022  1:15 PM Hayley Rose  [id number]] Fall, initial encounter     12/5/2022  1:15 PM Hayley Roca [[id number]] Injury of right ankle, initial encounter       ED Disposition     ED Disposition   Discharge    Condition   Stable    Date/Time   Mon Dec 5, 2022  1:15 PM    Comment   Bong Pineda discharge to home/self care                 Follow-up Information     Follow up With Specialties Details Why Contact Info Additional Information    MARA Menchaca Nurse Practitioner, Family Medicine   [address] [facility] [address] [phone]       [address] [place] [address] Orthopedic Surgery   [address] [facility] [address] [phone]  [address] [facility], [address] [address] [place], [place], [address] [facility] Emergency Department Emergency Medicine  If symptoms worsen [facility] [address] [facility] Emergency Department, [address] [place], [place], [address]          Patient's Medications   Discharge Prescriptions    No medications on file       No discharge procedures on file      PDMP Review       Value Time User    PDMP Reviewed  Yes 2/9/2022  1:55 PM Flavia Lagunas MD          ED Provider  Electronically Signed by           Shikha Kennedy PA-C  12/09/22 2746

## 2023-01-04 ENCOUNTER — TELEPHONE (OUTPATIENT)
Dept: NEUROLOGY | Facility: CLINIC | Age: 73
End: 2023-01-04

## 2023-01-04 NOTE — TELEPHONE ENCOUNTER
Contacted patient to see if she would be able to come in for a sooner f/u appt  Patient asked for me to contact her son, Gavin Liao about her coming for a sooner appt  Contacted Bob and ayala in regards to patient coming in for a sooner appt on Thursday, 01/05/23 at 9:30am w/ Dr Stephanie Johnston  Gave call back number and asked for Gavin Liao to call our office about the appt

## 2023-01-05 ENCOUNTER — OFFICE VISIT (OUTPATIENT)
Dept: NEUROLOGY | Facility: CLINIC | Age: 73
End: 2023-01-05

## 2023-01-05 VITALS
DIASTOLIC BLOOD PRESSURE: 70 MMHG | HEART RATE: 51 BPM | WEIGHT: 150 LBS | BODY MASS INDEX: 27.6 KG/M2 | SYSTOLIC BLOOD PRESSURE: 140 MMHG | HEIGHT: 62 IN

## 2023-01-05 DIAGNOSIS — R25.1 TREMOR: ICD-10-CM

## 2023-01-05 DIAGNOSIS — F17.200 CURRENT EVERY DAY SMOKER: ICD-10-CM

## 2023-01-05 DIAGNOSIS — F41.1 GENERALIZED ANXIETY DISORDER: ICD-10-CM

## 2023-01-05 DIAGNOSIS — R41.3 MEMORY DIFFICULTY: Primary | ICD-10-CM

## 2023-01-05 RX ORDER — MAGNESIUM OXIDE 400 MG/1
1 TABLET ORAL DAILY
COMMUNITY
Start: 2022-10-25

## 2023-01-05 RX ORDER — OXYBUTYNIN CHLORIDE 5 MG/1
TABLET ORAL
COMMUNITY
Start: 2022-12-20

## 2023-01-05 RX ORDER — DONEPEZIL HYDROCHLORIDE 10 MG/1
10 TABLET, FILM COATED ORAL
Qty: 90 TABLET | Refills: 1 | Status: SHIPPED | OUTPATIENT
Start: 2023-01-05

## 2023-01-05 RX ORDER — PRIMIDONE 50 MG/1
TABLET ORAL
Qty: 180 TABLET | Refills: 1 | Status: SHIPPED | OUTPATIENT
Start: 2023-01-05

## 2023-01-05 NOTE — PROGRESS NOTES
Vanna Corbin is a 81 y o  female  Chief Complaint   Patient presents with   • Memory Difficulty       Assessment:  1  Memory difficulty    2  Tremor    3  Generalized anxiety disorder    4  Current every day smoker        Plan:  Continue with Aricept 10 mg a day  Continue with mentally stimulating exercises  Increase primidone to 50 mg twice a day  Continue with Neurontin 100 mg 3 times a day  Patient was strongly advised to quit smoking  To take fall and safety precautions  To be under constant supervision and eat a healthy diet and regular exercise  She was advised to follow-up with [facility] regarding her arm weakness to make sure it is not from statin  Her blood pressure cholesterol and sugar under control, go to the hospital if has any worsening symptoms and call me otherwise to see me back in 6 months and follow-up with her other physicians          Subjective:    HPI   Patient is here accompanied with her son in follow-up for her tremor and memory difficulty for the last couple of years, since her last visit her memory seems to be stable, she still has some short-term memory issues, MoCA is 22/30, she is on Aricept 10 mg a day and seems to be tolerating it well, she still has some intentional tremor especially when she is eating, no resting tremor, no difficulty in swallowing no early morning stiffness no freezing episodes, she does complain of some weakness in her arms especially in the shoulder areas sometimes but denies have any neck pain no shoulder pain, could not tolerate a higher dose of gabapentin and hence it was decreased and she is currently on 100 mg 3 times a day and is tolerating it well this is being managed by her family physician for her diabetic neuropathy, she continues to smoke half a pack a day her sleep is slightly disturbed she does take melatonin at nighttime's no bowel and bladder incontinence appetite is good weight has been stable she is living by herself with her friends and son monitoring her she does not drive, mood is good no other complaints      Vitals:    01/05/23 0916   BP: 140/70   BP Location: Left arm   Patient Position: Sitting   Cuff Size: Standard   Pulse: (!) 51   Height: 5' 2" (1 575 m)       Current Medications    Current Outpatient Medications:   •  acetaminophen (TYLENOL) 650 mg CR tablet, Take 650 mg by mouth as needed for mild pain, Disp: , Rfl:   •  amLODIPine (NORVASC) 2 5 mg tablet, Take 2 5 mg by mouth daily  , Disp: , Rfl:   •  atorvastatin (LIPITOR) 20 mg tablet, Take 20 mg by mouth daily, Disp: , Rfl:   •  donepezil (ARICEPT) 10 mg tablet, TAKE 1 TABLET BY MOUTH DAILY AT BEDTIME, Disp: 90 tablet, Rfl: 1  •  fenofibrate (TRICOR) 145 mg tablet, Take 145 mg by mouth daily, Disp: , Rfl:   •  gabapentin (NEURONTIN) 100 mg capsule, Take 100 mg by mouth 3 (three) times a day, Disp: , Rfl:   •  glimepiride (AMARYL) 4 mg tablet, Take 4 mg by mouth daily with breakfast, Disp: , Rfl:   •  hydrochlorothiazide (HYDRODIURIL) 12 5 mg tablet, Take 12 5 mg by mouth daily, Disp: , Rfl:   •  irbesartan (AVAPRO) 300 mg tablet, Take 300 mg by mouth daily, Disp: , Rfl:   •  letrozole (FEMARA) 2 5 mg tablet, Take 1 tablet (2 5 mg total) by mouth daily, Disp: 84 tablet, Rfl: 0  •  magnesium oxide (MAG-OX) 400 mg tablet, Take 1 tablet by mouth daily, Disp: , Rfl:   •  Melatonin 10 MG TABS, Take 20 mg by mouth daily at bedtime as needed, Disp: , Rfl:   •  metFORMIN (GLUCOPHAGE) 1000 MG tablet, Take 1,000 mg by mouth 2 (two) times a day with meals  , Disp: , Rfl:   •  metoprolol succinate (TOPROL-XL) 50 mg 24 hr tablet, Take 75 mg by mouth daily, Disp: , Rfl:   •  omeprazole (PriLOSEC) 40 MG capsule, Take 40 mg by mouth daily, Disp: , Rfl:   •  oxybutynin (DITROPAN) 5 mg tablet, TAKE 1 TABLET BY MOUTH EVERY DAY AT NIGHT, Disp: , Rfl:   •  primidone (MYSOLINE) 50 mg tablet, 1 tab at bedtime, Disp: 90 tablet, Rfl: 1  •  sitaGLIPtin (JANUVIA) 100 mg tablet, Take 100 mg by mouth daily, Disp: , Rfl:   •  HYDROcodone-acetaminophen (Norco) 5-325 mg per tablet, Take 1 tablet by mouth every 6 (six) hours as needed for pain (Patient not taking: Reported on 9/20/2022), Disp: 10 tablet, Rfl: 0  •  oxyCODONE-acetaminophen (Percocet) 5-325 mg per tablet, Take 1 tablet by mouth every 6 (six) hours as needed for moderate pain Max Daily Amount: 4 tablets (Patient not taking: Reported on 9/14/2022), Disp: 20 tablet, Rfl: 0      Allergies  Codeine and Morphine    Past Medical History  Past Medical History:   Diagnosis Date   • Anxiety    • Breast cancer ([facility] [address] )    • Colon cancer ([facility] [address] ) 2015    chemo and surgery   • Depression    • [facility]    • Ductal carcinoma in situ (DCIS) of right breast 03/10/2022   • Hyperlipidemia    • Hypertension    • Skin cancer (melanoma) ([facility] [address] )          Past Surgical History:  Past Surgical History:   Procedure Laterality Date   • APPENDECTOMY     • BACK SURGERY     • BREAST BIOPSY Right 01/26/2022    right   • BREAST LUMPECTOMY Right 3/1/2022    Procedure: RIGHT BREAST JOHN  DIRECTED LUMPECTOMY;  Surgeon: Valeria Newby MD;  Location: [facility];  Service: Surgical Oncology   • BREAST LUMPECTOMY Right 4/19/2022    Procedure: LUMPECTOMY;  Surgeon: Valeria Newby MD;  Location: [facility];  Service: Surgical Oncology   • CHOLECYSTECTOMY     • COLON SURGERY     • COLONOSCOPY     • HYSTERECTOMY  2000   • MAMMO NEEDLE LOCALIZATION LEFT (ALL INC) Right 2/24/2022   • MAMMO STEREOTACTIC BREAST BIOPSY RIGHT (ALL INC) Right 1/26/2022   • US GUIDED BREAST BIOPSY LEFT COMPLETE Left 1/26/2022         Family History:  Family History   Problem Relation Age of Onset   • No Known Problems Mother    • No Known Problems Father    • No Known Problems Sister    • Lung cancer Brother    • No Known Problems Maternal Grandmother    • No Known Problems Maternal Grandfather    • No Known Problems Paternal Grandmother    • No Known Problems Paternal Grandfather • No Known Problems Daughter    • No Known Problems Maternal Aunt    • No Known Problems Paternal Aunt    • Other Paternal Aunt         oral cancer   • Breast cancer Paternal Aunt         age unknown   • Breast cancer Paternal Aunt         age unknown   • Breast cancer Cousin         age unknown   • Stomach cancer Paternal Uncle    • No Known Problems Son    • No Known Problems Son        Social History:   reports that she has been smoking cigarettes  She started smoking about 55 years ago  She has a 55 00 pack-year smoking history  She has never used smokeless tobacco  She reports that she does not drink alcohol and does not use drugs  I have reviewed the past medical history, surgical history, social and family history, current medications, allergies vitals, review of systems, and updated this information as appropriate today  Objective:    Physical Exam    Neurological Exam     GENERAL:  Cooperative in no acute distress  Well-developed and well-nourished     HEAD and NECK   Head is atraumatic normocephalic with no lesions or masses  Neck is supple with full range of motion     CARDIOVASCULAR  Carotid Arteries-no carotid bruits  NEUROLOGIC:  Mental Status-the patient is awake alert and oriented without aphasia or apraxia, MoCA is 22/30  Cranial Nerves: Visual fields are full to confrontation  Extraocular movements are full without nystagmus  Pupils are 2-1/2 mm and reactive  Face is symmetrical to light touch  Movements of facial expression move symmetrically  Hearing is normal to finger rub bilaterally  Soft palate lifts symmetrically  Shoulder shrug is symmetrical  Tongue is midline without atrophy  Motor: No drift is noted on arm extension  Strength is full in the upper and lower extremities with normal bulk and tone  Mild tremor of bilateral outstretched hands  Gait is unremarkable    ROS:  Review of Systems   Constitutional: Negative  Negative for appetite change and fever  HENT: Negative  Negative for hearing loss, tinnitus, trouble swallowing and voice change  Eyes: Negative  Negative for photophobia, pain and visual disturbance  Respiratory: Negative  Negative for shortness of breath  Cardiovascular: Negative  Negative for palpitations  Gastrointestinal: Negative  Negative for nausea and vomiting  Endocrine: Negative  Negative for cold intolerance  Genitourinary: Negative  Negative for dysuria, frequency and urgency  Musculoskeletal: Negative  Negative for gait problem, myalgias and neck pain  Skin: Negative  Negative for rash  Allergic/Immunologic: Negative  Neurological: Positive for tremors (tremors have worsened in both hands) and weakness (both arms)  Negative for dizziness, seizures, syncope, facial asymmetry, speech difficulty, light-headedness, numbness and headaches  Hematological: Negative  Does not bruise/bleed easily  Psychiatric/Behavioral: Negative  Negative for confusion, hallucinations and sleep disturbance

## 2023-01-31 ENCOUNTER — APPOINTMENT (EMERGENCY)
Dept: RADIOLOGY | Facility: HOSPITAL | Age: 73
End: 2023-01-31

## 2023-01-31 ENCOUNTER — HOSPITAL ENCOUNTER (EMERGENCY)
Facility: HOSPITAL | Age: 73
Discharge: HOME | End: 2023-01-31
Attending: EMERGENCY MEDICINE | Admitting: EMERGENCY MEDICINE

## 2023-01-31 VITALS
HEART RATE: 50 BPM | TEMPERATURE: 97.5 F | RESPIRATION RATE: 20 BRPM | SYSTOLIC BLOOD PRESSURE: 154 MMHG | OXYGEN SATURATION: 98 % | DIASTOLIC BLOOD PRESSURE: 69 MMHG

## 2023-01-31 DIAGNOSIS — R11.0 NAUSEA: Primary | ICD-10-CM

## 2023-01-31 LAB
2HR DELTA HS TROPONIN: -1 NG/L
ALBUMIN SERPL BCP-MCNC: 3.6 G/DL (ref 3.5–5)
ALP SERPL-CCNC: 64 U/L (ref 46–116)
ALT SERPL W P-5'-P-CCNC: 24 U/L (ref 12–78)
ANION GAP SERPL CALCULATED.3IONS-SCNC: 7 MMOL/L (ref 4–13)
AST SERPL W P-5'-P-CCNC: 21 U/L (ref 5–45)
ATRIAL RATE: 53 BPM
BASOPHILS # BLD AUTO: 0.03 THOUSANDS/ÂΜL (ref 0–0.1)
BASOPHILS NFR BLD AUTO: 0 % (ref 0–1)
BILIRUB SERPL-MCNC: 0.37 MG/DL (ref 0.2–1)
BUN SERPL-MCNC: 26 MG/DL (ref 5–25)
CALCIUM SERPL-MCNC: 9 MG/DL (ref 8.3–10.1)
CARDIAC TROPONIN I PNL SERPL HS: 2 NG/L
CARDIAC TROPONIN I PNL SERPL HS: 3 NG/L
CHLORIDE SERPL-SCNC: 99 MMOL/L (ref 96–108)
CO2 SERPL-SCNC: 29 MMOL/L (ref 21–32)
CREAT SERPL-MCNC: 1.07 MG/DL (ref 0.6–1.3)
EOSINOPHIL # BLD AUTO: 0.07 THOUSAND/ÂΜL (ref 0–0.61)
EOSINOPHIL NFR BLD AUTO: 1 % (ref 0–6)
ERYTHROCYTE [DISTWIDTH] IN BLOOD BY AUTOMATED COUNT: 12.6 % (ref 11.6–15.1)
GFR SERPL CREATININE-BSD FRML MDRD: 52 ML/MIN/1.73SQ M
GLUCOSE SERPL-MCNC: 187 MG/DL (ref 65–140)
HCT VFR BLD AUTO: 35.7 % (ref 34.8–46.1)
HGB BLD-MCNC: 11.9 G/DL (ref 11.5–15.4)
IMM GRANULOCYTES # BLD AUTO: 0.03 THOUSAND/UL (ref 0–0.2)
IMM GRANULOCYTES NFR BLD AUTO: 0 % (ref 0–2)
LIPASE SERPL-CCNC: 103 U/L (ref 73–393)
LYMPHOCYTES # BLD AUTO: 1.83 THOUSANDS/ÂΜL (ref 0.6–4.47)
LYMPHOCYTES NFR BLD AUTO: 16 % (ref 14–44)
MCH RBC QN AUTO: 30.2 PG (ref 26.8–34.3)
MCHC RBC AUTO-ENTMCNC: 33.3 G/DL (ref 31.4–37.4)
MCV RBC AUTO: 91 FL (ref 82–98)
MONOCYTES # BLD AUTO: 0.84 THOUSAND/ÂΜL (ref 0.17–1.22)
MONOCYTES NFR BLD AUTO: 8 % (ref 4–12)
NEUTROPHILS # BLD AUTO: 8.47 THOUSANDS/ÂΜL (ref 1.85–7.62)
NEUTS SEG NFR BLD AUTO: 75 % (ref 43–75)
NRBC BLD AUTO-RTO: 0 /100 WBCS
P AXIS: 63 DEGREES
PLATELET # BLD AUTO: 306 THOUSANDS/UL (ref 149–390)
PMV BLD AUTO: 10.1 FL (ref 8.9–12.7)
POTASSIUM SERPL-SCNC: 4.4 MMOL/L (ref 3.5–5.3)
PR INTERVAL: 218 MS
PROT SERPL-MCNC: 6.7 G/DL (ref 6.4–8.4)
QRS AXIS: 43 DEGREES
QRSD INTERVAL: 110 MS
QT INTERVAL: 448 MS
QTC INTERVAL: 420 MS
RBC # BLD AUTO: 3.94 MILLION/UL (ref 3.81–5.12)
SODIUM SERPL-SCNC: 135 MMOL/L (ref 135–147)
T WAVE AXIS: 68 DEGREES
VENTRICULAR RATE: 53 BPM
WBC # BLD AUTO: 11.27 THOUSAND/UL (ref 4.31–10.16)

## 2023-01-31 RX ORDER — ONDANSETRON 4 MG/1
4 TABLET, ORALLY DISINTEGRATING ORAL EVERY 8 HOURS PRN
Qty: 20 TABLET | Refills: 0 | Status: SHIPPED | OUTPATIENT
Start: 2023-01-31

## 2023-01-31 RX ORDER — ONDANSETRON 2 MG/ML
4 INJECTION INTRAMUSCULAR; INTRAVENOUS ONCE
Status: COMPLETED | OUTPATIENT
Start: 2023-01-31 | End: 2023-01-31

## 2023-01-31 RX ADMIN — ONDANSETRON 4 MG: 2 INJECTION INTRAMUSCULAR; INTRAVENOUS at 08:49

## 2023-01-31 RX ADMIN — SODIUM CHLORIDE 500 ML: 0.9 INJECTION, SOLUTION INTRAVENOUS at 08:48

## 2023-01-31 NOTE — ED PROVIDER NOTES
History  Chief Complaint   Patient presents with   • Nausea     Pt was on her way for scheduled mammogram this morning when she began to feel nauseous, denies vomiting or any other symptoms  Cresencio Castro is a 53-year-old female presenting for evaluation with complaint of nausea  PMHx includes htn, diabetes, colon ca, breast ca  Social history is notable for current tobacco use  Patient states that she had awoken in her usual state of health earlier today, noting that she had eaten her usual breakfast of scrambled eggs, toast, and coffee without issue  She states that while she was on her way for a scheduled mammogram, she had become acutely nauseous  She denies episodes of vomiting  She states that she also feels like she is going to have diarrhea  She denies any abdominal pain or chest pain  She denies fevers, chills, palpitations, diaphoresis, dizziness or lightheadedness, presyncope or syncopal episode from time of symptom onset  She denies recent travel or recent antibiotic use, suspicious food intake or ingestion of spoiled foods  She offers no other complaints or concerns at this time  Prior to Admission Medications   Prescriptions Last Dose Informant Patient Reported? Taking?    HYDROcodone-acetaminophen (Norco) 5-325 mg per tablet   No No   Sig: Take 1 tablet by mouth every 6 (six) hours as needed for pain   Patient not taking: Reported on 9/20/2022   Melatonin 10 MG TABS   Yes No   Sig: Take 20 mg by mouth daily at bedtime as needed   acetaminophen (TYLENOL) 650 mg CR tablet   Yes No   Sig: Take 650 mg by mouth as needed for mild pain   amLODIPine (NORVASC) 2 5 mg tablet   Yes No   Sig: Take 2 5 mg by mouth daily     atorvastatin (LIPITOR) 20 mg tablet   Yes No   Sig: Take 20 mg by mouth daily   donepezil (ARICEPT) 10 mg tablet   No No   Sig: Take 1 tablet (10 mg total) by mouth daily at bedtime   fenofibrate (TRICOR) 145 mg tablet   Yes No   Sig: Take 145 mg by mouth daily gabapentin (NEURONTIN) 100 mg capsule   Yes No   Sig: Take 100 mg by mouth 3 (three) times a day   glimepiride (AMARYL) 4 mg tablet   Yes No   Sig: Take 4 mg by mouth daily with breakfast   hydrochlorothiazide (HYDRODIURIL) 12 5 mg tablet   Yes No   Sig: Take 12 5 mg by mouth daily   irbesartan (AVAPRO) 300 mg tablet   Yes No   Sig: Take 300 mg by mouth daily   letrozole (FEMARA) 2 5 mg tablet   No No   Sig: Take 1 tablet (2 5 mg total) by mouth daily   magnesium oxide (MAG-OX) 400 mg tablet   Yes No   Sig: Take 1 tablet by mouth daily   metFORMIN (GLUCOPHAGE) 1000 MG tablet   Yes No   Sig: Take 1,000 mg by mouth 2 (two) times a day with meals     metoprolol succinate (TOPROL-XL) 50 mg 24 hr tablet   Yes No   Sig: Take 75 mg by mouth daily   omeprazole (PriLOSEC) 40 MG capsule   Yes No   Sig: Take 40 mg by mouth daily   oxyCODONE-acetaminophen (Percocet) 5-325 mg per tablet   No No   Sig: Take 1 tablet by mouth every 6 (six) hours as needed for moderate pain Max Daily Amount: 4 tablets   Patient not taking: Reported on 2022   oxybutynin (DITROPAN) 5 mg tablet   Yes No   Sig: TAKE 1 TABLET BY MOUTH EVERY DAY AT NIGHT   primidone (MYSOLINE) 50 mg tablet   No No   Si tab twice a day   sitaGLIPtin (JANUVIA) 100 mg tablet   Yes No   Sig: Take 100 mg by mouth daily      Facility-Administered Medications: None       Past Medical History:   Diagnosis Date   • Anxiety    • Breast cancer (HCC)    • Colon cancer ([facility] ) 2015    chemo and surgery   • Depression    • Diabetes mellitus ([facility] )    • Ductal carcinoma in situ (DCIS) of right breast 03/10/2022   • Hyperlipidemia    • Hypertension    • Skin cancer (melanoma) ([facility] )        Past Surgical History:   Procedure Laterality Date   • APPENDECTOMY     • BACK SURGERY     • BREAST BIOPSY Right 2022    right   • BREAST LUMPECTOMY Right 3/1/2022    Procedure: RIGHT BREAST JOHN  DIRECTED LUMPECTOMY;  Surgeon: Felecia Go MD;  Location: [facility]; Service: Surgical Oncology   • BREAST LUMPECTOMY Right 4/19/2022    Procedure: LUMPECTOMY;  Surgeon: PARESH Bettencourt MD;  Location: [facility];  Service: Surgical Oncology   • CHOLECYSTECTOMY     • COLON SURGERY     • COLONOSCOPY     • HYSTERECTOMY  2000   • MAMMO NEEDLE LOCALIZATION LEFT (ALL INC) Right 2/24/2022   • MAMMO STEREOTACTIC BREAST BIOPSY RIGHT (ALL INC) Right 1/26/2022   • US GUIDED BREAST BIOPSY LEFT COMPLETE Left 1/26/2022       Family History   Problem Relation Age of Onset   • No Known Problems Mother    • No Known Problems Father    • No Known Problems Sister    • Lung cancer Brother    • No Known Problems Maternal Grandmother    • No Known Problems Maternal Grandfather    • No Known Problems Paternal Grandmother    • No Known Problems Paternal Grandfather    • No Known Problems Daughter    • No Known Problems Maternal Aunt    • No Known Problems Paternal Aunt    • Other Paternal Aunt         oral cancer   • Breast cancer Paternal Aunt         age unknown   • Breast cancer Paternal [address]         age unknown   • Breast cancer Cousin         age unknown   • Stomach cancer Paternal Uncle    • No Known Problems Son    • No Known Problems Son      I have reviewed and agree with the history as documented  E-Cigarette/Vaping   • E-Cigarette Use Never User      E-Cigarette/Vaping Substances   • Nicotine No    • THC No    • CBD No    • Flavoring No    • Other No    • Unknown No      Social History     Tobacco Use   • Smoking status: Every Day     Packs/day: 1 00     Years: 55 00     Pack years: 55 00     Types: Cigarettes     Start date: 1/1/1968   • Smokeless tobacco: Never   Vaping Use   • Vaping Use: Never used   Substance Use Topics   • Alcohol use: Never   • Drug use: Never       Review of Systems   Constitutional: Negative for chills, diaphoresis, fatigue and fever  Cardiovascular: Negative for chest pain and palpitations  Gastrointestinal: Positive for nausea   Negative for abdominal pain, diarrhea and vomiting  Musculoskeletal: Negative for back pain and gait problem  Neurological: Negative for dizziness, weakness, numbness and headaches  All other systems reviewed and are negative  Physical Exam  Physical Exam  Vitals and nursing note reviewed  Constitutional:       General: She is not in acute distress  Appearance: Normal appearance  She is well-developed  She is not ill-appearing, toxic-appearing or diaphoretic  HENT:      Head: Normocephalic and atraumatic  Right Ear: Tympanic membrane, ear canal and external ear normal       Left Ear: Tympanic membrane, ear canal and external ear normal       Mouth/Throat:      Mouth: Mucous membranes are moist    Eyes:      Extraocular Movements: Extraocular movements intact  Conjunctiva/sclera: Conjunctivae normal       Pupils: Pupils are equal, round, and reactive to light  Cardiovascular:      Rate and Rhythm: Normal rate and regular rhythm  Pulses: Normal pulses  Pulmonary:      Effort: Pulmonary effort is normal  No respiratory distress  Breath sounds: Normal breath sounds  No wheezing, rhonchi or rales  Chest:      Chest wall: No tenderness  Abdominal:      General: There is no distension  Palpations: Abdomen is soft  Tenderness: There is no abdominal tenderness  There is no guarding or rebound  Musculoskeletal:         General: Normal range of motion  Cervical back: Normal range of motion and neck supple  Right lower leg: No edema  Left lower leg: No edema  Skin:     General: Skin is warm and dry  Capillary Refill: Capillary refill takes less than 2 seconds  Neurological:      Mental Status: She is alert  Motor: Motor function is intact  No weakness  Gait: Gait is intact     Psychiatric:         Mood and Affect: Mood normal          Vital Signs  ED Triage Vitals [01/31/23 0822]   Temperature Pulse Respirations Blood Pressure SpO2   97 5 °F (36 4 °C) (!) 50 20 154/69 98 %      Temp Source Heart Rate Source Patient Position - Orthostatic VS BP Location FiO2 (%)   Oral Monitor Sitting Left arm --      Pain Score       --           Vitals:    01/31/23 0822   BP: 154/69   Pulse: (!) 50   Patient Position - Orthostatic VS: Sitting         Visual Acuity      ED Medications  Medications   sodium chloride 0 9 % bolus 500 mL (0 mL Intravenous Stopped 1/31/23 1050)   ondansetron (ZOFRAN) injection 4 mg (4 mg Intravenous Given 1/31/23 0849)       Diagnostic Studies  Results Reviewed     Procedure Component Value Units Date/Time    HS Troponin I 2hr [[id number]]  (Normal) Collected: 01/31/23 1050    Lab Status: Final result Specimen: Blood from Arm, Right Updated: 01/31/23 1119     hs TnI 2hr 2 ng/L      Delta 2hr hsTnI -1 ng/L     HS Troponin 0hr (reflex protocol) [[id number]]  (Normal) Collected: 01/31/23 0846    Lab Status: Final result Specimen: Blood from Arm, Right Updated: 01/31/23 0920     hs TnI 0hr 3 ng/L     Comprehensive metabolic panel [[id number]]  (Abnormal) Collected: 01/31/23 0846    Lab Status: Final result Specimen: Blood from Arm, Right Updated: 01/31/23 0913     Sodium 135 mmol/L      Potassium 4 4 mmol/L      Chloride 99 mmol/L      CO2 29 mmol/L      ANION GAP 7 mmol/L      BUN 26 mg/dL      Creatinine 1 07 mg/dL      Glucose 187 mg/dL      Calcium 9 0 mg/dL      AST 21 U/L      ALT 24 U/L      Alkaline Phosphatase 64 U/L      Total Protein 6 7 g/dL      Albumin 3 6 g/dL      Total Bilirubin 0 37 mg/dL      eGFR 52 ml/min/1 73sq m     Narrative:      [place] guidelines for Chronic Kidney Disease (CKD):   •  Stage 1 with normal or high GFR (GFR > 90 mL/min/1 73 square meters)  •  Stage 2 Mild CKD (GFR = 60-89 mL/min/1 73 square meters)  •  Stage 3A Moderate CKD (GFR = 45-59 mL/min/1 73 square meters)  •  Stage 3B Moderate CKD (GFR = 30-44 mL/min/1 73 square meters)  •  Stage 4 Severe CKD (GFR = 15-29 mL/min/1 73 square meters)  • Stage 5 End Stage CKD (GFR <15 mL/min/1 73 square meters)  Note: GFR calculation is accurate only with a steady state creatinine    Lipase [[id number]]  (Normal) Collected: 01/31/23 0846    Lab Status: Final result Specimen: Blood from Arm, Right Updated: 01/31/23 0913     Lipase 103 u/L     CBC and differential [[id number]]  (Abnormal) Collected: 01/31/23 0846    Lab Status: Final result Specimen: Blood from Arm, Right Updated: 01/31/23 0857     WBC 11 27 Thousand/uL      RBC 3 94 Million/uL      Hemoglobin 11 9 g/dL      Hematocrit 35 7 %      MCV 91 fL      MCH 30 2 pg      MCHC 33 3 g/dL      RDW 12 6 %      MPV 10 1 fL      Platelets 150 Thousands/uL      nRBC 0 /100 WBCs      Neutrophils Relative 75 %      Immat GRANS % 0 %      Lymphocytes Relative 16 %      Monocytes Relative 8 %      Eosinophils Relative 1 %      Basophils Relative 0 %      Neutrophils Absolute 8 47 Thousands/µL      Immature Grans Absolute 0 03 Thousand/uL      Lymphocytes Absolute 1 83 Thousands/µL      Monocytes Absolute 0 84 Thousand/µL      Eosinophils Absolute 0 07 Thousand/µL      Basophils Absolute 0 03 Thousands/µL                  XR chest pa & lateral   Final Result by Aric Hopkins MD (01/31 1241)      No acute cardiopulmonary disease        Findings are stable            Workstation performed: [id number]                    Procedures  ECG 12 Lead Documentation Only    Date/Time: 1/31/2023 8:35 AM  Performed by: Signa Cushing, PA-C  Authorized by: Signa Cushing, PA-C     Indications / Diagnosis:  Nausea  ECG reviewed by me, the ED Provider: yes    Patient location:  ED  Rate:     ECG rate:  53  Rhythm:     Rhythm: sinus bradycardia and A-V block      Rhythm comment:  1st degree AV block  Ectopy:     Ectopy: none    QRS:     QRS axis:  Normal  Conduction:     Conduction: normal    ST segments:     ST segments:  Normal  T waves:     T waves: normal               ED Course                               SBIRT 20yo+ Flowsheet Row Most Recent Value   SBIRT (25 yo +)    In order to provide better care to our patients, we are screening all of our patients for alcohol and drug use  Would it be okay to ask you these screening questions? No Filed at: 01/31/2023 0856                    Medical Decision Making  This is a 68-year-old female presenting for evaluation of nausea which had developed acutely this morning  Patient denies episodes of vomiting or diarrhea, and also denies any chest pain, abdominal pain, back pain  Denies fevers or chills, recent illness or sick contacts  Differential diagnosis includes but is not limited to: gastritis, enteritis, food poisoning, viral illness, anginal equivalent/acs, electrolyte derangements, lurdes    Initial ED plan: ECG, labs, CXR; IV fluids and anti-emetics    Final ED Assessment: Vital signs reviewed on ED presentation, examination as above  All labs and imaging independently reviewed with imaging interpreted by the Radiologist   ECG shows sinus bradycardia which patient has known history of, without ischemic change  Troponin within normal limits  Delta -1  Remainder of work-up is without significant findings  The patient was given IV fluids and Zofran, reporting symptom improvement  She was able to tolerate oral intake while being observed in the emergency department without issue, and there were no episodes of vomiting during ED course  The patient has ambulated independently to the restroom while being observed in the ED without issue, denying increased nausea or imbalance  Suspect possibility of underlying viral illness, reviewing supportive care to include rest, hydration, bland diet, advancing as tolerated, and will discharge with prescription for Zofran to take as needed for relief of nausea  Recommended primary care follow-up for further evaluation if symptoms continue, and parameters for ED return discussed at length    Patient comfortable and agreeable with disposition and care plan as above  She was discharged in stable condition and had ambulated independently from the emergency department today without issue  Nausea: complicated acute illness or injury  Amount and/or Complexity of Data Reviewed  Labs: ordered  Radiology: ordered  Risk  Prescription drug management  Disposition  Final diagnoses:   Nausea     Time reflects when diagnosis was documented in both MDM as applicable and the Disposition within this note     Time User Action Codes Description Comment    1/31/2023 11:19 AM Liza Ennis Add [R11 0] Nausea       ED Disposition     ED Disposition   Discharge    Condition   Stable    Date/Time   Tue Jan 31, 2023 [address] [place] discharge to home/self care                 Follow-up Information     Follow up With Specialties Details Why Contact Info Additional Information    Synthia Libman, CRNP Nurse Practitioner, Family Medicine   [address] [place] [address] [phone]       [address] Emergency Department Emergency Medicine  If symptoms worsen [address] Emergency Department, [address] [place] [address], [place], [place], [address]          Discharge Medication List as of 1/31/2023 11:22 AM      START taking these medications    Details   ondansetron (Zofran ODT) 4 mg disintegrating tablet Take 1 tablet (4 mg total) by mouth every 8 (eight) hours as needed for nausea or vomiting, Starting Tue 1/31/2023, Normal         CONTINUE these medications which have NOT CHANGED    Details   acetaminophen (TYLENOL) 650 mg CR tablet Take 650 mg by mouth as needed for mild pain, Historical Med      amLODIPine (NORVASC) 2 5 mg tablet Take 2 5 mg by mouth daily  , Starting Tue 7/27/2021, Historical Med      atorvastatin (LIPITOR) 20 mg tablet Take 20 mg by mouth daily, Starting Sun 2/6/2022, Historical Med      donepezil (ARICEPT) 10 mg tablet Take 1 tablet (10 mg total) by mouth daily at bedtime, Starting Thu 1/5/2023, Normal      fenofibrate (TRICOR) 145 mg tablet Take 145 mg by mouth daily, Starting Thu 3/17/2022, Historical Med      gabapentin (NEURONTIN) 100 mg capsule Take 100 mg by mouth 3 (three) times a day, Historical Med      glimepiride (AMARYL) 4 mg tablet Take 4 mg by mouth daily with breakfast, Historical Med      hydrochlorothiazide (HYDRODIURIL) 12 5 mg tablet Take 12 5 mg by mouth daily, Historical Med      HYDROcodone-acetaminophen (Norco) 5-325 mg per tablet Take 1 tablet by mouth every 6 (six) hours as needed for pain, Starting Wed 2/9/2022, Normal      irbesartan (AVAPRO) 300 mg tablet Take 300 mg by mouth daily, Starting Wed 7/21/2021, Historical Med      letrozole [facility]) 2 5 mg tablet Take 1 tablet (2 5 mg total) by mouth daily, Starting Tue 12/13/2022, Until Tue 3/7/2023, Normal      magnesium oxide (MAG-OX) 400 mg tablet Take 1 tablet by mouth daily, Starting Tue 10/25/2022, Historical Med      Melatonin 10 MG TABS Take 20 mg by mouth daily at bedtime as needed, Historical Med      metFORMIN (GLUCOPHAGE) 1000 MG tablet Take 1,000 mg by mouth 2 (two) times a day with meals  , Historical Med      metoprolol succinate (TOPROL-XL) 50 mg 24 hr tablet Take 75 mg by mouth daily, Historical Med      omeprazole (PriLOSEC) 40 MG capsule Take 40 mg by mouth daily, Historical Med      oxybutynin (DITROPAN) 5 mg tablet TAKE 1 TABLET BY MOUTH EVERY DAY AT NIGHT, Historical Med      oxyCODONE-acetaminophen (Percocet) 5-325 mg per tablet Take 1 tablet by mouth every 6 (six) hours as needed for moderate pain Max Daily Amount: 4 tablets, Starting Tue 4/19/2022, Normal      primidone (MYSOLINE) 50 mg tablet 1 tab twice a day, Normal      sitaGLIPtin (JANUVIA) 100 mg tablet Take 100 mg by mouth daily, Historical Med             No discharge procedures on file      PDMP Review       Value Time User    PDMP Reviewed  Yes 2/9/2022  1:55 Dede Nelson MD          ED Provider  Electronically Signed by           Rodrick Dao PA-C  01/31/23 7330

## 2023-01-31 NOTE — DISCHARGE INSTRUCTIONS
Zofran as needed for relief of nausea  Drink plenty of fluids and continue hydration as tolerated  Return to the ED with any new or worsening symptoms as discussed including but not limited to onset of vomiting or inability to tolerate oral intake, signs or symptoms of dehydration, or any other worrisome symptoms as discussed

## 2023-02-01 ENCOUNTER — HOSPITAL ENCOUNTER (OUTPATIENT)
Dept: MRI IMAGING | Facility: HOSPITAL | Age: 73
Discharge: HOME | End: 2023-02-01
Attending: PSYCHIATRY & NEUROLOGY

## 2023-02-01 DIAGNOSIS — R41.3 MEMORY DIFFICULTY: ICD-10-CM

## 2023-02-06 ENCOUNTER — TELEPHONE (OUTPATIENT)
Dept: HEMATOLOGY ONCOLOGY | Facility: CLINIC | Age: 73
End: 2023-02-06

## 2023-02-06 NOTE — TELEPHONE ENCOUNTER
Called Bob to reschedule appointment that was canceled  Appointment was rescheduled  Went over new date and time  He acknowledged

## 2023-02-22 ENCOUNTER — TELEPHONE (OUTPATIENT)
Dept: HEMATOLOGY ONCOLOGY | Facility: CLINIC | Age: 73
End: 2023-02-22

## 2023-02-24 ENCOUNTER — HOSPITAL ENCOUNTER (OUTPATIENT)
Dept: MAMMOGRAPHY | Facility: CLINIC | Age: 73
End: 2023-02-24

## 2023-02-24 VITALS — HEIGHT: 62 IN | BODY MASS INDEX: 27.6 KG/M2 | WEIGHT: 150 LBS

## 2023-02-24 DIAGNOSIS — D05.11 DUCTAL CARCINOMA IN SITU (DCIS) OF RIGHT BREAST: ICD-10-CM

## 2023-03-01 ENCOUNTER — TELEPHONE (OUTPATIENT)
Dept: SURGICAL ONCOLOGY | Facility: CLINIC | Age: 73
End: 2023-03-01

## 2023-03-01 NOTE — TELEPHONE ENCOUNTER
Called and spoke to patient's son, Shahzad Ruiz regarding her missed appointment today with Dr Christa Gandara  He got the times mixed up for today  We rescheduled her for Friday 3/10/23  Shahzad Ruiz was agreeable to date/time/location

## 2023-03-08 PROBLEM — Z98.890 HISTORY OF LUMPECTOMY OF RIGHT BREAST: Status: ACTIVE | Noted: 2023-03-08

## 2023-03-10 ENCOUNTER — OFFICE VISIT (OUTPATIENT)
Dept: SURGICAL ONCOLOGY | Facility: CLINIC | Age: 73
End: 2023-03-10

## 2023-03-10 VITALS
OXYGEN SATURATION: 97 % | BODY MASS INDEX: 27.05 KG/M2 | SYSTOLIC BLOOD PRESSURE: 142 MMHG | WEIGHT: 147 LBS | TEMPERATURE: 97.5 F | DIASTOLIC BLOOD PRESSURE: 72 MMHG | RESPIRATION RATE: 18 BRPM | HEIGHT: 62 IN | HEART RATE: 69 BPM

## 2023-03-10 DIAGNOSIS — Z79.811 USE OF LETROZOLE (FEMARA): ICD-10-CM

## 2023-03-10 DIAGNOSIS — Z98.890 HISTORY OF LUMPECTOMY OF RIGHT BREAST: ICD-10-CM

## 2023-03-10 DIAGNOSIS — D05.11 DUCTAL CARCINOMA IN SITU (DCIS) OF RIGHT BREAST: Primary | ICD-10-CM

## 2023-03-10 NOTE — PROGRESS NOTES
Surgical Oncology Follow Up  [facility], [place]  [facility] [place]  [address]  [place] [id number]    Martine Mondragon  1950  [id number]      Chief Complaint   Patient presents with   • Follow-up     6m f/u 2/24/23 b/l dx mammo B2  4/19/22 right lumpectomy residual focus DCIS all margins negative  3/1/22 Right breast lumpectomy DCIS, 4 4cm, all margins negative for DCIS Anterior margin <1mm, lateral margin <2mm  ER 90 TX 5, necrosis present focal & cental necrosis, genetics negative DCISionRT 3 9 rad onc alcosejennifer, on letrozole w/ adonis          Assessment & Plan:   Here for follow-up with right breast cancer status post diagnostic mammogram which films I reviewed no worrisome features  She is on letrozole and tolerating well no major side effects such as vaginal spotting shortness of breath or leg swelling  I will see her in 6 months time  We will order her next diagnostic mammogram    Cancer History:     Oncology History Overview Note    with DCIS right breast successfully underwent lumpectomy in March repeated in April for clear margin  This was low-grade however the Dicsion RT shows an elevated score  She is maintained on letrozole  She finished adjuvant radiation therapy on 8/11/22 She was last seen 9/14/22 and presents today for follow up  1/5/23 Neurology, Bryon Grave  Mild cognitive impairment   Continue with Aricept 10 mg a day  Continue with mentally stimulating exercises  Increase primidone to 50 mg twice a day  Continue with Neurontin 100 mg 3 times a day  Patient was strongly advised to quit smoking  To take fall and safety precautions  1/31/23 ED- nausea    2/1/23 MRI brain NeuroQuant wo contrast  White matter changes suggestive of chronic microangiopathy  No acute intracranial pathology     NeuroQuant analysis was performed: Low normal hippocampal volume and enlarged inferior lateral and overall ventricular system, suggestive of global ex-vacuo dilatation  The additional finding of an abnormal Hippocampal Occupancy Score, ([address]), is   concerning for a mesial temporal lobe focused Neurodegenerative process  Although the hippocampal volume has slightly increased since the prior study, this is thought to be technical factors related to patient motion  23 Diagnostic B/L mammogram  Right breast postsurgical scarring and distortion have expected postoperative appearance  Are no suspicious masses, grouped microcalcifications or areas of unexplained architectural distortion  The skin and nipple areolar complex are unremarkable  RECOMMENDATION:       - Diagnostic mammogram in 1 year for both breasts  3/1/23 Dr Sarah Corrigan      Upcomin23 Dr Hina Sethi     Ductal carcinoma in situ (DCIS) of right breast   2022 Biopsy    Right breast stereotactic biopsy  1 o'clock  Breast tissue with focal atypical ductal hyperplasia, usual ductal hyperplasia, stromal fibrosis, and sclerosing adenosis with microcalcifications   Negative for in-situ or invasive carcinoma    Concordant  Biopsy clip is anterior to central aspect of architectural distortion  Dilia Matt reflector was intentionally targeted posterior to biopsy clip, midway between the distortion and the biopsy clip  3/1/2022 Surgery    Right breast vivek  directed lumpectomy  Ductal carcinoma in situ  4 4 cm  Margins negative - distance from DCIS to anterior margin is less than 1 mm, distance from DCIS to lateral margin less than 2 mm    ER 90  AK 5     3/1/2022 Initial Diagnosis    Ductal carcinoma in situ (DCIS) of right breast     3/14/2022 Genetic Testing    [facility]  A total of 36 genes were evaluated, including: JOLANTA, BRCA1, BRCA2, CDH1, CHEK2, PALB2, PTEN, STK11, TP53  Negative result   No pathogenic sequence variants or deletions/duplications identified     3/14/2022 Genomic Testing    DCISionRT   Addendum   SCORE 3 9      2022 Surgery    Right breast lumpectomy  Residual focus of DCIS cribriform pattern  Low nuclear grade  Superior margin is negative for tumor by 6 0 mm   The remaining surgical margins are negative for tumor by wider range     7/13/2022 - 8/11/2022 Radiation    Treatments:  Course: C1    Plan ID Energy Fractions Dose per Fraction (cGy) Dose Correction (cGy) Total Dose Delivered (cGy) Elapsed Days   R Breast 6X 16 / 16 266 0 4,256 22   R Breast e 9E 5 / 5 200 0 1,000 6      Treatment Dates:  7/13/2022 - 8/11/2022  Interval History:   Follow-up with right breast cancer    Review of Systems:   Review of Systems   Constitutional: Negative for chills and fever  HENT: Negative for ear pain and sore throat  Eyes: Negative for pain and visual disturbance  Respiratory: Negative for cough and shortness of breath  Cardiovascular: Negative for chest pain and palpitations  Gastrointestinal: Negative for abdominal pain and vomiting  Genitourinary: Negative for dysuria and hematuria  Musculoskeletal: Negative for arthralgias and back pain  Skin: Negative for color change and rash  Neurological: Negative for seizures and syncope  All other systems reviewed and are negative        Past Medical History     Patient Active Problem List   Diagnosis   • Tremor   • Memory difficulty   • Breast fibroadenoma in female, left   • Atypical ductal hyperplasia of right breast   • Ductal carcinoma in situ (DCIS) of right breast   • 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 (HCC)   • Generalized anxiety disorder   • Hypertension associated with diabetes ([facility] [address] )   • Stage 3a chronic kidney disease (HCC)   • Gastroesophageal reflux disease   • Current every day smoker   • Post-menopausal   • History of colon cancer, stage III   • Use of letrozole (Femara)   • History of lumpectomy of right breast     Past Medical History:   Diagnosis Date   • Anxiety    • Breast cancer ([facility] [address] )     right   • Colon cancer ([facility] [address] ) 2015    chemo and surgery   • Depression • Diabetes mellitus ([facility] [address] )    • Ductal carcinoma in situ (DCIS) of right breast 03/10/2022   • Hyperlipidemia    • Hypertension    • Skin cancer (melanoma) [facility])      Past Surgical History:   Procedure Laterality Date   • APPENDECTOMY     • BACK SURGERY     • BREAST BIOPSY Right 01/26/2022    right   • BREAST LUMPECTOMY Right 3/1/2022    Procedure: RIGHT BREAST JOHN  DIRECTED LUMPECTOMY;  Surgeon: Mercy Barrera MD;  Location: [facility];  Service: Surgical Oncology   • BREAST LUMPECTOMY Right 4/19/2022    Procedure: LUMPECTOMY;  Surgeon: Mercy Barrera MD;  Location: [facility];  Service: Surgical Oncology   • CHOLECYSTECTOMY     • COLON SURGERY     • COLONOSCOPY     • HYSTERECTOMY  2000   • MAMMO NEEDLE LOCALIZATION LEFT (ALL INC) Right 2/24/2022   • MAMMO STEREOTACTIC BREAST BIOPSY RIGHT (ALL INC) Right 1/26/2022   • US GUIDED BREAST BIOPSY LEFT COMPLETE Left 1/26/2022     Family History   Problem Relation Age of Onset   • No Known Problems Mother    • No Known Problems Father    • No Known Problems Sister    • Lung cancer Brother    • No Known Problems Maternal Grandmother    • No Known Problems Maternal Grandfather    • No Known Problems Paternal Grandmother    • No Known Problems Paternal Grandfather    • No Known Problems Daughter    • No Known Problems Maternal Aunt    • No Known Problems Paternal Aunt    • Other Paternal Aunt         oral cancer   • Breast cancer Paternal Aunt         age unknown   • Breast cancer Paternal Aunt         age unknown   • Breast cancer Cousin         age unknown   • Stomach cancer Paternal Uncle    • No Known Problems Son    • No Known Problems Son      Social History     Socioeconomic History   • Marital status:      Spouse name: Not on file   • Number of children: Not on file   • Years of education: Not on file   • Highest education level: Not on file   Occupational History   • Not on file   Tobacco Use   • Smoking status: Every Day Packs/day: 1 00     Years: 55 00     Pack years: 55 00     Types: Cigarettes     Start date: 1/1/1968   • Smokeless tobacco: Never   Vaping Use   • Vaping Use: Never used   Substance and Sexual Activity   • Alcohol use: Never   • Drug use: Never   • Sexual activity: Not Currently   Other Topics Concern   • Not on file   Social History Narrative   • Not on file     Social Determinants of Health     Financial Resource Strain: Not on file   Food Insecurity: Not on file   Transportation Needs: Not on file   Physical Activity: Not on file   Stress: Not on file   Social Connections: Not on file   Intimate Partner Violence: Not on file   Housing Stability: Not on file       Current Outpatient Medications:   •  amLODIPine (NORVASC) 2 5 mg tablet, Take 2 5 mg by mouth daily  , Disp: , Rfl:   •  atorvastatin (LIPITOR) 20 mg tablet, Take 20 mg by mouth daily, Disp: , Rfl:   •  donepezil (ARICEPT) 10 mg tablet, Take 1 tablet (10 mg total) by mouth daily at bedtime, Disp: 90 tablet, Rfl: 1  •  fenofibrate (TRICOR) 145 mg tablet, Take 145 mg by mouth daily, Disp: , Rfl:   •  gabapentin (NEURONTIN) 100 mg capsule, Take 100 mg by mouth 3 (three) times a day, Disp: , Rfl:   •  glimepiride (AMARYL) 4 mg tablet, Take 4 mg by mouth daily with breakfast, Disp: , Rfl:   •  hydrochlorothiazide (HYDRODIURIL) 12 5 mg tablet, Take 12 5 mg by mouth daily, Disp: , Rfl:   •  irbesartan (AVAPRO) 300 mg tablet, Take 300 mg by mouth daily, Disp: , Rfl:   •  letrozole (FEMARA) 2 5 mg tablet, TAKE 1 TABLET BY MOUTH EVERY DAY, Disp: 30 tablet, Rfl: 0  •  magnesium oxide (MAG-OX) 400 mg tablet, Take 1 tablet by mouth daily, Disp: , Rfl:   •  metFORMIN (GLUCOPHAGE) 1000 MG tablet, Take 1,000 mg by mouth 2 (two) times a day with meals  , Disp: , Rfl:   •  metoprolol succinate (TOPROL-XL) 50 mg 24 hr tablet, Take 75 mg by mouth daily, Disp: , Rfl:   •  omeprazole (PriLOSEC) 40 MG capsule, Take 40 mg by mouth daily, Disp: , Rfl:   •  ondansetron (Zofran ODT) 4 mg disintegrating tablet, Take 1 tablet (4 mg total) by mouth every 8 (eight) hours as needed for nausea or vomiting, Disp: 20 tablet, Rfl: 0  •  oxybutynin (DITROPAN) 5 mg tablet, TAKE 1 TABLET BY MOUTH EVERY DAY AT NIGHT, Disp: , Rfl:   •  primidone (MYSOLINE) 50 mg tablet, 1 tab twice a day, Disp: 180 tablet, Rfl: 1  •  sitaGLIPtin (JANUVIA) 100 mg tablet, Take 100 mg by mouth daily, Disp: , Rfl:   •  acetaminophen (TYLENOL) 650 mg CR tablet, Take 650 mg by mouth as needed for mild pain, Disp: , Rfl:   •  HYDROcodone-acetaminophen (Norco) 5-325 mg per tablet, Take 1 tablet by mouth every 6 (six) hours as needed for pain (Patient not taking: Reported on 9/20/2022), Disp: 10 tablet, Rfl: 0  •  Melatonin 10 MG TABS, Take 20 mg by mouth daily at bedtime as needed, Disp: , Rfl:   •  oxyCODONE-acetaminophen (Percocet) 5-325 mg per tablet, Take 1 tablet by mouth every 6 (six) hours as needed for moderate pain Max Daily Amount: 4 tablets (Patient not taking: Reported on 9/14/2022), Disp: 20 tablet, Rfl: 0  Allergies   Allergen Reactions   • Codeine GI Intolerance   • Morphine GI Intolerance       Physical Exam:     Vitals:    03/10/23 1010   BP: 142/72   Pulse: 69   Resp: 18   Temp: 97 5 °F (36 4 °C)   SpO2: 97%     Physical Exam  Constitutional:       Appearance: Normal appearance  HENT:      Head: Normocephalic and atraumatic  Nose: Nose normal       Mouth/Throat:      Mouth: Mucous membranes are moist    Eyes:      Pupils: Pupils are equal, round, and reactive to light  Cardiovascular:      Rate and Rhythm: Normal rate  Pulses: Normal pulses  Heart sounds: Normal heart sounds  Pulmonary:      Effort: Pulmonary effort is normal       Breath sounds: Normal breath sounds  Chest:          Comments: The bilateral Breast(s) were examined  There was not any sign of an inverted nipple, mass, nipple discharge, skin changes or tenderness   The bilateral  breast(s) were examined in the sitting and supine position  There are not any worrisome skin changes, tenderness, nipple changes, swelling ,bleeding or evidence of mass/s in all four quadrants  [place] survey demonstrated that there is not any evidence of any clinically suspicious axillary, pectoral or supraclavicular lymph nodes  Abdominal:      General: Bowel sounds are normal       Palpations: Abdomen is soft  Musculoskeletal:         General: Normal range of motion  Cervical back: Normal range of motion and neck supple  Skin:     General: Skin is warm and dry  Neurological:      General: No focal deficit present  Mental Status: She is alert and oriented to person, place, and time  Psychiatric:         Mood and Affect: Mood normal          Behavior: Behavior normal          Thought Content: Thought content normal          Judgment: Judgment normal            Results & Discussion:   Mammogram:  TISSUE DENSITY:   The breasts are heterogeneously dense, which may obscure small masses       INDICATION: Pieter Genao is a 81 y o  female presenting for annual mammography  History of right breast conservation therapy      FINDINGS:      Right breast postsurgical scarring and distortion have expected postoperative appearance  Are no suspicious masses, grouped microcalcifications or areas of unexplained architectural distortion  The skin and nipple areolar complex are unremarkable  IMPRESSION:   Benign findings    ASSESSMENT/BI-RADS CATEGORY:  Left: 1 - Negative  Right: 2 - Benign  Overall: 2 - Benign     RECOMMENDATION:       - Diagnostic mammogram in 1 year for both breasts  I did review the films of most recent mammograms and I agree with the report   I did discussed in detail nature of breast cancer recurrence and developing new cancer risk self breast examinations need for diagnostic mammogram annually were discussed she understands and  agrees   All patient questions were answered  Advance Care Planning/Advance Directives:   I Katty Bah MD discussed the disease status with Ravindra Webb  today 03/10/23  treatment plans and follow-up with the patient

## 2023-04-27 DIAGNOSIS — R25.1 TREMOR: ICD-10-CM

## 2023-04-27 RX ORDER — PRIMIDONE 50 MG/1
TABLET ORAL
Qty: 180 TABLET | Refills: 1 | Status: SHIPPED | OUTPATIENT
Start: 2023-04-27

## 2023-07-12 ENCOUNTER — OFFICE VISIT (OUTPATIENT)
Dept: NEUROLOGY | Facility: CLINIC | Age: 73
End: 2023-07-12
Payer: COMMERCIAL

## 2023-07-12 VITALS
HEIGHT: 62 IN | DIASTOLIC BLOOD PRESSURE: 60 MMHG | HEART RATE: 55 BPM | BODY MASS INDEX: 26.5 KG/M2 | OXYGEN SATURATION: 97 % | WEIGHT: 144 LBS | SYSTOLIC BLOOD PRESSURE: 130 MMHG

## 2023-07-12 DIAGNOSIS — G31.84 MILD COGNITIVE IMPAIRMENT: ICD-10-CM

## 2023-07-12 DIAGNOSIS — Z85.038 HISTORY OF COLON CANCER, STAGE III: ICD-10-CM

## 2023-07-12 DIAGNOSIS — Z98.890 HISTORY OF LUMPECTOMY OF RIGHT BREAST: ICD-10-CM

## 2023-07-12 DIAGNOSIS — I15.2 HYPERTENSION ASSOCIATED WITH DIABETES (HCC): ICD-10-CM

## 2023-07-12 DIAGNOSIS — R25.1 TREMOR: ICD-10-CM

## 2023-07-12 DIAGNOSIS — R41.3 MEMORY DIFFICULTY: Primary | ICD-10-CM

## 2023-07-12 DIAGNOSIS — E11.59 HYPERTENSION ASSOCIATED WITH DIABETES (HCC): ICD-10-CM

## 2023-07-12 PROCEDURE — 99214 OFFICE O/P EST MOD 30 MIN: CPT | Performed by: PSYCHIATRY & NEUROLOGY

## 2023-07-12 RX ORDER — AMMONIUM LACTATE 12 G/100G
CREAM TOPICAL
COMMUNITY
Start: 2023-04-08

## 2023-07-12 RX ORDER — DONEPEZIL HYDROCHLORIDE 10 MG/1
10 TABLET, FILM COATED ORAL
Qty: 90 TABLET | Refills: 1 | Status: SHIPPED | OUTPATIENT
Start: 2023-07-12

## 2023-07-12 RX ORDER — PRIMIDONE 50 MG/1
TABLET ORAL
Qty: 270 TABLET | Refills: 1 | Status: SHIPPED | OUTPATIENT
Start: 2023-07-12

## 2023-07-12 NOTE — PROGRESS NOTES
Shannan Duggan is a 81 y.o. female. Chief Complaint   Patient presents with   • Mild cognitive impairment   • Tremors       Assessment:  1. Memory difficulty    2. Tremor    3. History of lumpectomy of right breast    4. History of colon cancer, stage III    5. Hypertension associated with diabetes ([address])    6. Mild cognitive impairment        Plan:  Increase primidone to 50 mg in the morning and 100 mg at nighttime. Continue with Aricept 10 mg a day. Patient is on Neurontin 100 mg 3 times a day. To be under constant supervision. To take fall and safety precautions. She was advised to quit smoking. Patient is not keen to go on Namenda. Continue with mentally stimulating exercises. To keep blood pressure cholesterol and sugar under control, to go to the hospital if has any worsening symptoms and call me otherwise to see me back in 6 months and follow-up with her other physicians. Subjective:    HPI   Patient is here in follow-up accompanied with her son for her tremor and memory difficulty, since her last visit she feels her memory is stable, MoCA is 22/30, she feels her tremor is slightly worse is mostly intentional when she is eating or holding a cup of coffee no resting tremor no difficulty in swallowing no early morning stiffness, no freezing episodes, she is on Neurontin 100 mg 3 times a day and is tolerating it well for her diabetic neuropathy which is being managed by her physician, she continues to smoke every day, no bowel and bladder incontinence, no freezing episodes, no hallucinations, no other complaints.     Vitals:    07/12/23 1146   BP: 130/60   BP Location: Right arm   Patient Position: Sitting   Cuff Size: Standard   Pulse: 55   SpO2: 97%   Weight: 65.3 kg (144 lb)   Height: 5' 2" (1.575 m)       Current Medications    Current Outpatient Medications:   •  acetaminophen (TYLENOL) 650 mg CR tablet, Take 650 mg by mouth as needed for mild pain, Disp: , Rfl:   •  amLODIPine (NORVASC) 2.5 mg tablet, Take 2.5 mg by mouth daily  , Disp: , Rfl:   •  atorvastatin (LIPITOR) 20 mg tablet, Take 20 mg by mouth daily, Disp: , Rfl:   •  donepezil (ARICEPT) 10 mg tablet, Take 1 tablet (10 mg total) by mouth daily at bedtime, Disp: 90 tablet, Rfl: 1  •  fenofibrate (TRICOR) 145 mg tablet, Take 145 mg by mouth daily, Disp: , Rfl:   •  gabapentin (NEURONTIN) 100 mg capsule, Take 100 mg by mouth 3 (three) times a day, Disp: , Rfl:   •  glimepiride (AMARYL) 4 mg tablet, Take 4 mg by mouth daily with breakfast, Disp: , Rfl:   •  hydrochlorothiazide (HYDRODIURIL) 12.5 mg tablet, Take 12.5 mg by mouth daily, Disp: , Rfl:   •  irbesartan (AVAPRO) 300 mg tablet, Take 300 mg by mouth daily, Disp: , Rfl:   •  letrozole (FEMARA) 2.5 mg tablet, TAKE 1 TABLET BY MOUTH EVERY DAY, Disp: 30 tablet, Rfl: 0  •  magnesium oxide (MAG-OX) 400 mg tablet, Take 1 tablet by mouth daily, Disp: , Rfl:   •  metFORMIN (GLUCOPHAGE) 1000 MG tablet, Take 1,000 mg by mouth 2 (two) times a day with meals  , Disp: , Rfl:   •  metoprolol succinate (TOPROL-XL) 50 mg 24 hr tablet, Take 75 mg by mouth daily, Disp: , Rfl:   •  omeprazole (PriLOSEC) 40 MG capsule, Take 40 mg by mouth daily, Disp: , Rfl:   •  ondansetron (Zofran ODT) 4 mg disintegrating tablet, Take 1 tablet (4 mg total) by mouth every 8 (eight) hours as needed for nausea or vomiting, Disp: 20 tablet, Rfl: 0  •  oxybutynin (DITROPAN) 5 mg tablet, TAKE 1 TABLET BY MOUTH EVERY DAY AT NIGHT, Disp: , Rfl:   •  primidone (MYSOLINE) 50 mg tablet, TAKE 1 TABLET BY MOUTH TWICE A DAY, Disp: 180 tablet, Rfl: 1  •  sitaGLIPtin (JANUVIA) 100 mg tablet, Take 100 mg by mouth daily, Disp: , Rfl:   •  ammonium lactate (LAC-HYDRIN) 12 % cream, APPLY TO DRY SKIN ON FEET AND LOWER LEGS, Disp: , Rfl:   •  HYDROcodone-acetaminophen (Norco) 5-325 mg per tablet, Take 1 tablet by mouth every 6 (six) hours as needed for pain, Disp: 10 tablet, Rfl: 0  •  Melatonin 10 MG TABS, Take 20 mg by mouth daily at bedtime as needed, Disp: , Rfl:   •  oxyCODONE-acetaminophen (Percocet) 5-325 mg per tablet, Take 1 tablet by mouth every 6 (six) hours as needed for moderate pain Max Daily Amount: 4 tablets, Disp: 20 tablet, Rfl: 0      Allergies  Codeine and Morphine    Past Medical History  Past Medical History:   Diagnosis Date   • Anxiety    • Breast cancer ([address])     right   • Colon cancer ([address]) 2015    chemo and surgery   • Depression    • Diabetes mellitus ([address])    • Ductal carcinoma in situ (DCIS) of right breast 03/10/2022   • Hyperlipidemia    • Hypertension    • Skin cancer (melanoma) ([address])          Past Surgical History:  Past Surgical History:   Procedure Laterality Date   • APPENDECTOMY     • BACK SURGERY     • BREAST BIOPSY Right 01/26/2022    right   • BREAST LUMPECTOMY Right 3/1/2022    Procedure: RIGHT BREAST JOHN  DIRECTED LUMPECTOMY;  Surgeon: Terrance Booker MD;  Location: [facility];  Service: Surgical Oncology   • BREAST LUMPECTOMY Right 4/19/2022    Procedure: LUMPECTOMY;  Surgeon: Terrance Booker MD;  [facility]: [facility];  Service: Surgical Oncology   • CHOLECYSTECTOMY     • COLON SURGERY     • COLONOSCOPY     • HYSTERECTOMY  2000   • MAMMO NEEDLE LOCALIZATION LEFT (ALL INC) Right 2/24/2022   • MAMMO STEREOTACTIC BREAST BIOPSY RIGHT (ALL INC) Right 1/26/2022   • US GUIDED BREAST BIOPSY LEFT COMPLETE Left 1/26/2022         Family History:  Family History   Problem Relation Age of Onset   • No Known Problems Mother    • No Known Problems Father    • No Known Problems Sister    • Lung cancer Brother    • No Known Problems Maternal Grandmother    • No Known Problems Maternal Grandfather    • No Known Problems Paternal Grandmother    • No Known Problems Paternal Grandfather    • No Known Problems Daughter    • No Known Problems Maternal Aunt    • No Known Problems Paternal Aunt    • Other Paternal Aunt         oral cancer   • Breast cancer Paternal Aunt         age unknown   • Breast cancer Paternal Aunt         age unknown   • Breast cancer Cousin         age unknown   • Stomach cancer Paternal Uncle    • No Known Problems Son    • No Known Problems Son        Social History:   reports that she has been smoking cigarettes. She started smoking about 55 years ago. She has a 55.00 pack-year smoking history. She has never used smokeless tobacco. She reports that she does not drink alcohol and does not use drugs. I have reviewed the past medical history, surgical history, social and family history, current medications, allergies vitals, review of systems, and updated this information as appropriate today. Objective:    Physical Exam    Neurological Exam     GENERAL:  Cooperative in no acute distress. Well-developed and well-nourished     HEAD and NECK   Head is atraumatic normocephalic with no lesions or masses. Neck is supple with full range of motion     CARDIOVASCULAR  Carotid Arteries-no carotid bruits. NEUROLOGIC:  Mental Status-the patient is awake alert and oriented without aphasia or apraxia  Cranial Nerves: Visual fields are full to confrontation. Extraocular movements are full without nystagmus. Pupils are 2-1/2 mm and reactive. Face is symmetrical to light touch. Movements of facial expression move symmetrically. Hearing is normal to finger rub bilaterally. Soft palate lifts symmetrically. Shoulder shrug is symmetrical. Tongue is midline without atrophy. Motor: No drift is noted on arm extension. Strength is full in the upper and lower extremities with normal bulk and tone, tremor on outstretched hands bilaterally. Gait is unremarkable    ROS:  Review of Systems   Constitutional: Negative for appetite change, fatigue and fever. HENT: Negative. Negative for hearing loss, tinnitus, trouble swallowing and voice change. Eyes: Negative. Negative for photophobia, pain and visual disturbance. Respiratory: Negative. Negative for shortness of breath.     Cardiovascular: Negative. Negative for palpitations. Gastrointestinal: Negative. Negative for nausea and vomiting. Endocrine: Negative. Negative for cold intolerance. Genitourinary: Negative. Negative for dysuria, frequency and urgency. Musculoskeletal: Negative for back pain, gait problem, myalgias and neck pain. Skin: Negative. Negative for rash. Allergic/Immunologic: Negative. Neurological: Positive for tremors. Negative for dizziness, seizures, syncope, facial asymmetry, speech difficulty, weakness, light-headedness, numbness and headaches. Hematological: Negative. Does not bruise/bleed easily. Psychiatric/Behavioral: Positive for confusion. Negative for hallucinations and sleep disturbance.

## 2023-09-06 ENCOUNTER — TELEPHONE (OUTPATIENT)
Dept: HEMATOLOGY ONCOLOGY | Facility: CLINIC | Age: 73
End: 2023-09-06

## 2023-09-06 NOTE — TELEPHONE ENCOUNTER
Patient's son sent message through [employer] asking to have patient's appt. Rescheduled. She is rescheduled for 10/18/23 with Dr. Clarisa Gli.

## 2023-10-11 ENCOUNTER — TELEPHONE (OUTPATIENT)
Dept: HEMATOLOGY ONCOLOGY | Facility: CLINIC | Age: 73
End: 2023-10-11

## 2023-10-11 NOTE — TELEPHONE ENCOUNTER
LM for patient to call back to reschedule appt. With Dr. Mla Cruz. Received message from [employer] cancelling appt. On 10/18/23.

## 2023-12-12 ENCOUNTER — TELEPHONE (OUTPATIENT)
Dept: NEUROLOGY | Facility: CLINIC | Age: 73
End: 2023-12-12

## 2023-12-12 NOTE — TELEPHONE ENCOUNTER
LVM for patient to call back and reschedule upcoming 1/26 appointment as provider will not be in the office.

## 2023-12-18 NOTE — TELEPHONE ENCOUNTER
Pts care giver called in to r/s appt 1/26 with Dr. Cardona. Can you please assist as I'm not sure where you were going to r/s her to?   Thank you. Please call Shakira

## 2024-01-10 DIAGNOSIS — R25.1 TREMOR: ICD-10-CM

## 2024-01-10 RX ORDER — PRIMIDONE 50 MG/1
TABLET ORAL
Qty: 270 TABLET | Refills: 1 | Status: SHIPPED | OUTPATIENT
Start: 2024-01-10

## 2024-01-18 ENCOUNTER — TELEPHONE (OUTPATIENT)
Dept: NEUROLOGY | Facility: CLINIC | Age: 74
End: 2024-01-18

## 2024-01-18 NOTE — TELEPHONE ENCOUNTER
Patients son Bob, called to reschedule 6m FU that was canceled.    Patient was scheduled with Dr. Cardona in [place] [facility] on 8/27/24 at 9am    Patient was added to wait list.    Bob is main caregiver and transport for patient and would prefer appointments on Tues/Weds/Thurs if possible. But is more than willing to take early appointments offered with advance notice due to work obligations.

## 2024-02-28 ENCOUNTER — HOSPITAL ENCOUNTER (OUTPATIENT)
Dept: MAMMOGRAPHY | Facility: CLINIC | Age: 74
Discharge: HOME | End: 2024-02-28
Payer: COMMERCIAL

## 2024-02-28 VITALS — HEIGHT: 62 IN | WEIGHT: 144 LBS | BODY MASS INDEX: 26.5 KG/M2

## 2024-02-28 DIAGNOSIS — D05.11 DUCTAL CARCINOMA IN SITU (DCIS) OF RIGHT BREAST: ICD-10-CM

## 2024-02-28 PROCEDURE — G0279 TOMOSYNTHESIS, MAMMO: HCPCS

## 2024-02-28 PROCEDURE — 77066 DX MAMMO INCL CAD BI: CPT

## 2024-07-04 DIAGNOSIS — R25.1 TREMOR: ICD-10-CM

## 2024-07-05 RX ORDER — PRIMIDONE 50 MG/1
TABLET ORAL
Qty: 270 TABLET | Refills: 0 | Status: SHIPPED | OUTPATIENT
Start: 2024-07-05

## 2024-07-10 ENCOUNTER — HOSPITAL ENCOUNTER (OUTPATIENT)
Dept: ULTRASOUND IMAGING | Facility: CLINIC | Age: 74
Discharge: HOME | End: 2024-07-10
Payer: COMMERCIAL

## 2024-07-10 DIAGNOSIS — N64.4 BREAST PAIN, RIGHT: ICD-10-CM

## 2024-07-10 PROCEDURE — 76642 ULTRASOUND BREAST LIMITED: CPT

## 2024-08-27 ENCOUNTER — OFFICE VISIT (OUTPATIENT)
Dept: NEUROLOGY | Facility: CLINIC | Age: 74
End: 2024-08-27
Payer: COMMERCIAL

## 2024-08-27 VITALS
DIASTOLIC BLOOD PRESSURE: 80 MMHG | BODY MASS INDEX: 29.74 KG/M2 | OXYGEN SATURATION: 97 % | SYSTOLIC BLOOD PRESSURE: 138 MMHG | HEART RATE: 59 BPM | WEIGHT: 161.6 LBS | HEIGHT: 62 IN

## 2024-08-27 DIAGNOSIS — F17.200 CURRENT EVERY DAY SMOKER: ICD-10-CM

## 2024-08-27 DIAGNOSIS — E08.21 DIABETES DUE TO UNDERLYING CONDITION W DIABETIC NEPHROPATHY (HCC): ICD-10-CM

## 2024-08-27 DIAGNOSIS — E11.59 HYPERTENSION ASSOCIATED WITH DIABETES  (HCC): ICD-10-CM

## 2024-08-27 DIAGNOSIS — G31.84 MILD COGNITIVE IMPAIRMENT: ICD-10-CM

## 2024-08-27 DIAGNOSIS — R41.3 MEMORY DIFFICULTY: Primary | ICD-10-CM

## 2024-08-27 DIAGNOSIS — R25.1 TREMOR: ICD-10-CM

## 2024-08-27 DIAGNOSIS — I15.2 HYPERTENSION ASSOCIATED WITH DIABETES  (HCC): ICD-10-CM

## 2024-08-27 DIAGNOSIS — F41.1 GENERALIZED ANXIETY DISORDER: ICD-10-CM

## 2024-08-27 PROCEDURE — 99214 OFFICE O/P EST MOD 30 MIN: CPT | Performed by: PSYCHIATRY & NEUROLOGY

## 2024-08-27 RX ORDER — MEMANTINE HYDROCHLORIDE 10 MG/1
10 TABLET ORAL 2 TIMES DAILY
Qty: 60 TABLET | Refills: 5 | Status: SHIPPED | OUTPATIENT
Start: 2024-08-27

## 2024-08-27 RX ORDER — DONEPEZIL HYDROCHLORIDE 10 MG/1
10 TABLET, FILM COATED ORAL
Qty: 90 TABLET | Refills: 1 | Status: SHIPPED | OUTPATIENT
Start: 2024-08-27

## 2024-08-27 RX ORDER — PRIMIDONE 50 MG/1
TABLET ORAL
Qty: 360 TABLET | Refills: 1 | Status: SHIPPED | OUTPATIENT
Start: 2024-08-27

## 2024-08-27 RX ORDER — MEMANTINE HYDROCHLORIDE 5 MG-10 MG
KIT ORAL
Qty: 1 TABLET | Refills: 0 | Status: SHIPPED | OUTPATIENT
Start: 2024-08-27

## 2024-08-27 NOTE — PROGRESS NOTES
Lauren Das is a 73 y.o. female.   Chief Complaint   Patient presents with    Memory difficulty    Tremors       Assessment:  1. Memory difficulty    2. Tremor    3. Generalized anxiety disorder    4. Current every day smoker    5. Hypertension associated with diabetes  (HCC)    6. 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 (HCC)    7. Mild cognitive impairment         Plan:  Patient was advised to increase primidone to 100 mg twice a day to help with her tremor, she is also on Neurontin 100 mg 3 times a day.    She was advised to take fall and safety precautions, she was strongly encouraged to quit smoking,    Her MoCA is 20/30 she does have mild cognitive impairment she is on Aricept 10 mg a day we did discuss about Namenda she would like to go on the medication side effects discussed with her and will stop if experiences any side effects we will start her on Namenda titration pack followed by 10 mg twice daily.    She was advised to be under constant supervision to keep her blood pressure cholesterol sugar and weight under control, to go to the hospital if has any worsening symptoms and call me otherwise to see me back in 6 months or sooner if needed and follow-up with the other physicians.        Subjective:    HPI   Patient is here in follow-up accompanied with her son for her tremor and memory difficulty, since her last visit she tells me that she still has some short-term memory issues sometimes she feels it is the same but feels her tremor is getting worse mostly as intentional when she is holding things she does not have any resting tremor there is no difficulty in swallowing, no early morning stiffness, no freezing episodes, she is on Neurontin 100 mg 3 times a day for her diabetic neuropathy and is tolerating it well, she is on primidone 50 mg in the morning and 100 mg at nighttime and is tolerating it well, she continues to smoke a pack every day and have repeatedly told her to quit  "smoking and she does understand the risk and is trying to quit smoking, denies any bowel or bladder incontinence no falls, no hallucinations,, mood is good, sleep is good, no other complaints.    Vitals:    08/27/24 0856   Pulse: 59   SpO2: 97%   Height: 5' 2\" (1.575 m)         Current Medications    Current Outpatient Medications:     ammonium lactate (LAC-HYDRIN) 12 % cream, APPLY TO DRY SKIN ON FEET AND LOWER LEGS, Disp: , Rfl:     atorvastatin (LIPITOR) 20 mg tablet, Take 40 mg by mouth daily, Disp: , Rfl:     donepezil (ARICEPT) 10 mg tablet, Take 1 tablet (10 mg total) by mouth daily at bedtime, Disp: 90 tablet, Rfl: 1    fenofibrate (TRICOR) 145 mg tablet, Take 145 mg by mouth daily, Disp: , Rfl:     gabapentin (NEURONTIN) 100 mg capsule, Take 100 mg by mouth 3 (three) times a day, Disp: , Rfl:     glimepiride (AMARYL) 4 mg tablet, Take 4 mg by mouth daily with breakfast, Disp: , Rfl:     hydrochlorothiazide (HYDRODIURIL) 12.5 mg tablet, Take 12.5 mg by mouth daily, Disp: , Rfl:     irbesartan (AVAPRO) 300 mg tablet, Take 300 mg by mouth daily, Disp: , Rfl:     Melatonin 10 MG TABS, Take 20 mg by mouth daily at bedtime as needed, Disp: , Rfl:     metoprolol succinate (TOPROL-XL) 50 mg 24 hr tablet, Take 75 mg by mouth daily, Disp: , Rfl:     primidone (MYSOLINE) 50 mg tablet, TAKE 1 TABLET IN THE MORNING AND 2 TABLETS AT NIGHTTIME, Disp: 270 tablet, Rfl: 0    sitaGLIPtin (JANUVIA) 100 mg tablet, Take 100 mg by mouth daily, Disp: , Rfl:     acetaminophen (TYLENOL) 650 mg CR tablet, Take 650 mg by mouth as needed for mild pain, Disp: , Rfl:     amLODIPine (NORVASC) 2.5 mg tablet, Take 2.5 mg by mouth daily   (Patient not taking: Reported on 8/27/2024), Disp: , Rfl:     HYDROcodone-acetaminophen (Norco) 5-325 mg per tablet, Take 1 tablet by mouth every 6 (six) hours as needed for pain, Disp: 10 tablet, Rfl: 0    letrozole (FEMARA) 2.5 mg tablet, TAKE 1 TABLET BY MOUTH EVERY DAY (Patient not taking: Reported on " 8/27/2024), Disp: 30 tablet, Rfl: 0    magnesium oxide (MAG-OX) 400 mg tablet, Take 1 tablet by mouth daily, Disp: , Rfl:     metFORMIN (GLUCOPHAGE) 1000 MG tablet, Take 1,000 mg by mouth 2 (two) times a day with meals  , Disp: , Rfl:     omeprazole (PriLOSEC) 40 MG capsule, Take 40 mg by mouth daily (Patient not taking: Reported on 8/27/2024), Disp: , Rfl:     ondansetron (Zofran ODT) 4 mg disintegrating tablet, Take 1 tablet (4 mg total) by mouth every 8 (eight) hours as needed for nausea or vomiting, Disp: 20 tablet, Rfl: 0    oxybutynin (DITROPAN) 5 mg tablet, TAKE 1 TABLET BY MOUTH EVERY DAY AT NIGHT, Disp: , Rfl:     oxyCODONE-acetaminophen (Percocet) 5-325 mg per tablet, Take 1 tablet by mouth every 6 (six) hours as needed for moderate pain Max Daily Amount: 4 tablets, Disp: 20 tablet, Rfl: 0      Allergies  Codeine and Morphine    Past Medical History  Past Medical History:   Diagnosis Date    Anxiety     Breast cancer (HCC) 2022    right    Colon cancer (HCC) 2015    chemo and surgery    Depression     Diabetes mellitus (HCC)     Ductal carcinoma in situ (DCIS) of right breast 03/10/2022    History of chemotherapy     History of radiation therapy 2022    right breast    Hyperlipidemia     Hypertension     Skin cancer (melanoma) (HCC)          Past Surgical History:  Past Surgical History:   Procedure Laterality Date    APPENDECTOMY      BACK SURGERY      BREAST BIOPSY Right 01/26/2022    right    BREAST LUMPECTOMY Right 3/1/2022    Procedure: RIGHT BREAST JOHN  DIRECTED LUMPECTOMY;  Surgeon: Dotty Ledezma MD;  Location: [facility];  Service: Surgical Oncology    BREAST LUMPECTOMY Right 4/19/2022    Procedure: LUMPECTOMY;  Surgeon: Dotty Ledezma MD;  Location: [facility];  Service: Surgical Oncology    CHOLECYSTECTOMY      COLON SURGERY      COLONOSCOPY      HYSTERECTOMY  2000    MAMMO NEEDLE LOCALIZATION LEFT (ALL INC) Right 2/24/2022    MAMMO STEREOTACTIC BREAST BIOPSY RIGHT  (ALL INC) Right 1/26/2022    US GUIDED BREAST BIOPSY LEFT COMPLETE Left 1/26/2022         Family History:  Family History   Problem Relation Age of Onset    No Known Problems Mother     No Known Problems Father     No Known Problems Sister     Lung cancer Brother     No Known Problems Maternal Grandmother     No Known Problems Maternal Grandfather     No Known Problems Paternal Grandmother     No Known Problems Paternal Grandfather     No Known Problems Daughter     No Known Problems Maternal Aunt     No Known Problems Paternal Aunt     Other Paternal Aunt         oral cancer    Breast cancer Paternal Aunt         age unknown    Breast cancer Paternal Aunt         age unknown    Breast cancer Cousin         age unknown    Stomach cancer Paternal Uncle     No Known Problems Son     No Known Problems Son        Social History:   reports that she has been smoking cigarettes. She started smoking about 56 years ago. She has a 56.7 pack-year smoking history. She has never used smokeless tobacco. She reports that she does not drink alcohol and does not use drugs.    I have reviewed the past medical history, surgical history, social and family history, current medications, allergies vitals, review of systems, and updated this information as appropriate today.   Objective:    Physical Exam    Neurological Exam     GENERAL:  Cooperative in no acute distress. Well-developed and well-nourished     HEAD and NECK   Head is atraumatic normocephalic with no lesions or masses. Neck is supple with full range of motion     CARDIOVASCULAR  Carotid Arteries-no carotid bruits.     NEUROLOGIC:  Mental Status-the patient is awake alert and oriented without aphasia or apraxia, MoCA is 20/30  Cranial Nerves: Visual fields are full to confrontation.   Extraocular movements are full without nystagmus. Pupils are 2-1/2 mm and reactive. Face is symmetrical to light touch. Movements of facial expression move symmetrically. Hearing is normal to  finger rub bilaterally. Soft palate lifts symmetrically. Shoulder shrug is symmetrical. Tongue is midline without atrophy.  Motor: No drift is noted on arm extension. Strength is full in the upper and lower extremities with normal bulk and tone.   Gait reveals a normal base with symmetrical arm swing.   Reflexes: 1+ and symmetrical    ROS:  Review of Systems   Constitutional:  Negative for appetite change, fatigue and fever.   HENT: Negative.  Negative for hearing loss, tinnitus, trouble swallowing and voice change.    Eyes: Negative.  Negative for photophobia, pain and visual disturbance.   Respiratory: Negative.  Negative for shortness of breath.    Cardiovascular: Negative.  Negative for palpitations.   Gastrointestinal: Negative.  Negative for nausea and vomiting.   Endocrine: Negative.  Negative for cold intolerance.   Genitourinary: Negative.  Negative for dysuria, frequency and urgency.   Musculoskeletal:  Negative for back pain, gait problem, myalgias, neck pain and neck stiffness.   Skin: Negative.  Negative for rash.   Allergic/Immunologic: Negative.    Neurological:  Positive for tremors. Negative for dizziness, seizures, syncope, facial asymmetry, speech difficulty, weakness, light-headedness, numbness and headaches.   Hematological: Negative.  Does not bruise/bleed easily.   Psychiatric/Behavioral: Negative.  Negative for confusion, hallucinations and sleep disturbance.

## 2024-09-18 DIAGNOSIS — R41.3 MEMORY DIFFICULTY: ICD-10-CM

## 2024-09-18 RX ORDER — MEMANTINE HYDROCHLORIDE 10 MG/1
10 TABLET ORAL 2 TIMES DAILY
Qty: 180 TABLET | Refills: 2 | Status: SHIPPED | OUTPATIENT
Start: 2024-09-18

## 2024-11-11 ENCOUNTER — APPOINTMENT (EMERGENCY)
Dept: RADIOLOGY | Facility: HOSPITAL | Age: 74
End: 2024-11-11
Payer: COMMERCIAL

## 2024-11-11 ENCOUNTER — HOSPITAL ENCOUNTER (EMERGENCY)
Facility: HOSPITAL | Age: 74
Discharge: HOME | End: 2024-11-12
Attending: EMERGENCY MEDICINE
Payer: COMMERCIAL

## 2024-11-11 DIAGNOSIS — J44.1 COPD EXACERBATION (HCC): ICD-10-CM

## 2024-11-11 DIAGNOSIS — J06.9 UPPER RESPIRATORY INFECTION: Primary | ICD-10-CM

## 2024-11-11 PROCEDURE — 99285 EMERGENCY DEPT VISIT HI MDM: CPT

## 2024-11-11 PROCEDURE — 71046 X-RAY EXAM CHEST 2 VIEWS: CPT

## 2024-11-11 PROCEDURE — 94640 AIRWAY INHALATION TREATMENT: CPT

## 2024-11-11 RX ORDER — IPRATROPIUM BROMIDE AND ALBUTEROL SULFATE 2.5; .5 MG/3ML; MG/3ML
3 SOLUTION RESPIRATORY (INHALATION) ONCE
Status: COMPLETED | OUTPATIENT
Start: 2024-11-12 | End: 2024-11-12

## 2024-11-12 ENCOUNTER — APPOINTMENT (EMERGENCY)
Dept: CT IMAGING | Facility: HOSPITAL | Age: 74
End: 2024-11-12
Payer: COMMERCIAL

## 2024-11-12 VITALS
OXYGEN SATURATION: 95 % | TEMPERATURE: 97.5 F | RESPIRATION RATE: 16 BRPM | DIASTOLIC BLOOD PRESSURE: 62 MMHG | SYSTOLIC BLOOD PRESSURE: 142 MMHG | HEART RATE: 62 BPM

## 2024-11-12 LAB
2HR DELTA HS TROPONIN: 0 NG/L
ALBUMIN SERPL BCG-MCNC: 3.7 G/DL (ref 3.5–5)
ALP SERPL-CCNC: 79 U/L (ref 34–104)
ALT SERPL W P-5'-P-CCNC: 23 U/L (ref 7–52)
ANION GAP SERPL CALCULATED.3IONS-SCNC: 8 MMOL/L (ref 4–13)
APTT PPP: 37 SECONDS (ref 23–34)
AST SERPL W P-5'-P-CCNC: 24 U/L (ref 13–39)
ATRIAL RATE: 62 BPM
BASOPHILS # BLD MANUAL: 0.06 THOUSAND/UL (ref 0–0.1)
BASOPHILS NFR MAR MANUAL: 1 % (ref 0–1)
BILIRUB SERPL-MCNC: 0.24 MG/DL (ref 0.2–1)
BNP SERPL-MCNC: 17 PG/ML (ref 0–100)
BUN SERPL-MCNC: 25 MG/DL (ref 5–25)
CALCIUM SERPL-MCNC: 8.6 MG/DL (ref 8.4–10.2)
CARDIAC TROPONIN I PNL SERPL HS: 5 NG/L
CARDIAC TROPONIN I PNL SERPL HS: 5 NG/L
CHLORIDE SERPL-SCNC: 96 MMOL/L (ref 96–108)
CO2 SERPL-SCNC: 25 MMOL/L (ref 21–32)
CREAT SERPL-MCNC: 1.07 MG/DL (ref 0.6–1.3)
EOSINOPHIL # BLD MANUAL: 0.06 THOUSAND/UL (ref 0–0.4)
EOSINOPHIL NFR BLD MANUAL: 1 % (ref 0–6)
ERYTHROCYTE [DISTWIDTH] IN BLOOD BY AUTOMATED COUNT: 12.9 % (ref 11.6–15.1)
FLUAV AG UPPER RESP QL IA.RAPID: NEGATIVE
FLUBV AG UPPER RESP QL IA.RAPID: NEGATIVE
GFR SERPL CREATININE-BSD FRML MDRD: 51 ML/MIN/1.73SQ M
GLUCOSE SERPL-MCNC: 246 MG/DL (ref 65–140)
HCT VFR BLD AUTO: 38.7 % (ref 34.8–46.1)
HGB BLD-MCNC: 12.7 G/DL (ref 11.5–15.4)
INR PPP: 0.88 (ref 0.85–1.19)
LYMPHOCYTES # BLD AUTO: 2.12 THOUSAND/UL (ref 0.6–4.47)
LYMPHOCYTES # BLD AUTO: 28 % (ref 14–44)
MCH RBC QN AUTO: 28.8 PG (ref 26.8–34.3)
MCHC RBC AUTO-ENTMCNC: 32.8 G/DL (ref 31.4–37.4)
MCV RBC AUTO: 88 FL (ref 82–98)
MONOCYTES # BLD AUTO: 0.69 THOUSAND/UL (ref 0–1.22)
MONOCYTES NFR BLD: 11 % (ref 4–12)
NEUTROPHILS # BLD MANUAL: 3.31 THOUSAND/UL (ref 1.85–7.62)
NEUTS BAND NFR BLD MANUAL: 3 % (ref 0–8)
NEUTS SEG NFR BLD AUTO: 50 % (ref 43–75)
P AXIS: 73 DEGREES
PLATELET # BLD AUTO: 253 THOUSANDS/UL (ref 149–390)
PLATELET BLD QL SMEAR: ADEQUATE
PMV BLD AUTO: 9.4 FL (ref 8.9–12.7)
POTASSIUM SERPL-SCNC: 4.3 MMOL/L (ref 3.5–5.3)
PR INTERVAL: 200 MS
PROCALCITONIN SERPL-MCNC: <0.05 NG/ML
PROT SERPL-MCNC: 6.4 G/DL (ref 6.4–8.4)
PROTHROMBIN TIME: 12.7 SECONDS (ref 12.3–15)
QRS AXIS: -15 DEGREES
QRSD INTERVAL: 110 MS
QT INTERVAL: 438 MS
QTC INTERVAL: 444 MS
RBC # BLD AUTO: 4.41 MILLION/UL (ref 3.81–5.12)
RBC MORPH BLD: NORMAL
SARS-COV+SARS-COV-2 AG RESP QL IA.RAPID: NEGATIVE
SODIUM SERPL-SCNC: 129 MMOL/L (ref 135–147)
T WAVE AXIS: 72 DEGREES
VARIANT LYMPHS # BLD AUTO: 6 %
VENTRICULAR RATE: 62 BPM
WBC # BLD AUTO: 6.24 THOUSAND/UL (ref 4.31–10.16)

## 2024-11-12 PROCEDURE — 85610 PROTHROMBIN TIME: CPT

## 2024-11-12 PROCEDURE — 83880 ASSAY OF NATRIURETIC PEPTIDE: CPT

## 2024-11-12 PROCEDURE — 93010 ELECTROCARDIOGRAM REPORT: CPT | Performed by: INTERNAL MEDICINE

## 2024-11-12 PROCEDURE — 84484 ASSAY OF TROPONIN QUANT: CPT

## 2024-11-12 PROCEDURE — 85027 COMPLETE CBC AUTOMATED: CPT

## 2024-11-12 PROCEDURE — 36415 COLL VENOUS BLD VENIPUNCTURE: CPT

## 2024-11-12 PROCEDURE — 96365 THER/PROPH/DIAG IV INF INIT: CPT

## 2024-11-12 PROCEDURE — 87811 SARS-COV-2 COVID19 W/OPTIC: CPT

## 2024-11-12 PROCEDURE — 80053 COMPREHEN METABOLIC PANEL: CPT

## 2024-11-12 PROCEDURE — 84145 PROCALCITONIN (PCT): CPT

## 2024-11-12 PROCEDURE — 93005 ELECTROCARDIOGRAM TRACING: CPT

## 2024-11-12 PROCEDURE — 85007 BL SMEAR W/DIFF WBC COUNT: CPT

## 2024-11-12 PROCEDURE — 71250 CT THORAX DX C-: CPT

## 2024-11-12 PROCEDURE — 87804 INFLUENZA ASSAY W/OPTIC: CPT

## 2024-11-12 PROCEDURE — 85730 THROMBOPLASTIN TIME PARTIAL: CPT

## 2024-11-12 RX ORDER — PREDNISONE 20 MG/1
40 TABLET ORAL ONCE
Status: COMPLETED | OUTPATIENT
Start: 2024-11-12 | End: 2024-11-12

## 2024-11-12 RX ORDER — AZITHROMYCIN 500 MG/1
500 TABLET, FILM COATED ORAL ONCE
Status: COMPLETED | OUTPATIENT
Start: 2024-11-12 | End: 2024-11-12

## 2024-11-12 RX ORDER — ALBUTEROL SULFATE 90 UG/1
2 INHALANT RESPIRATORY (INHALATION) ONCE
Status: COMPLETED | OUTPATIENT
Start: 2024-11-12 | End: 2024-11-12

## 2024-11-12 RX ORDER — IPRATROPIUM BROMIDE AND ALBUTEROL SULFATE 2.5; .5 MG/3ML; MG/3ML
3 SOLUTION RESPIRATORY (INHALATION) ONCE
Status: COMPLETED | OUTPATIENT
Start: 2024-11-12 | End: 2024-11-12

## 2024-11-12 RX ORDER — AZITHROMYCIN 250 MG/1
TABLET, FILM COATED ORAL
Qty: 6 TABLET | Refills: 0 | Status: SHIPPED | OUTPATIENT
Start: 2024-11-12 | End: 2024-11-16

## 2024-11-12 RX ADMIN — IPRATROPIUM BROMIDE AND ALBUTEROL SULFATE 3 ML: 2.5; .5 SOLUTION RESPIRATORY (INHALATION) at 01:38

## 2024-11-12 RX ADMIN — PREDNISONE 40 MG: 20 TABLET ORAL at 01:38

## 2024-11-12 RX ADMIN — AZITHROMYCIN 500 MG: 500 TABLET, FILM COATED ORAL at 02:55

## 2024-11-12 RX ADMIN — ALBUTEROL SULFATE 2 PUFF: 90 AEROSOL, METERED RESPIRATORY (INHALATION) at 02:55

## 2024-11-12 RX ADMIN — CEFTRIAXONE SODIUM 1000 MG: 10 INJECTION, POWDER, FOR SOLUTION INTRAVENOUS at 01:38

## 2024-11-12 RX ADMIN — IPRATROPIUM BROMIDE AND ALBUTEROL SULFATE 3 ML: 2.5; .5 SOLUTION RESPIRATORY (INHALATION) at 00:20

## 2024-11-12 NOTE — DISCHARGE INSTRUCTIONS
Follow-up with PCP and pulmonology.  Take medicine as directed.  If symptoms worsen or new symptoms appear return to ER as discussed.

## 2024-11-12 NOTE — ED PROVIDER NOTES
Time reflects when diagnosis was documented in both MDM as applicable and the Disposition within this note       Time User Action Codes Description Comment    11/12/2024  2:52 AM Puma Patricio Add [J06.9] Upper respiratory infection     11/12/2024  2:53 AM Puma Patricio Add [J44.1] COPD exacerbation (HCC)           ED Disposition       ED Disposition   Discharge    Condition   Stable    Date/Time   Tue Nov 12, 2024  2:53 AM    Comment   Lauren Das discharge to home/self care.                   Assessment & Plan       Medical Decision Making  This patient presents with symptoms most consistent with an acute COPD exacerbation. These constellation of symptoms are similar to prior exacerbations. The likely precipitant is acute respiratory infection. Low suspicion for alternate etiologies such as pneumothorax, acute PE, pneumonia. Presentation not consistent with other acute cardiopulmonary causes including ACS, CHF. Patient given ipratropium, albuterol, solumedrol here with improvement of symptoms. And will be sent home with steroid burst and abx. Discussed strict return parameters. Prior to discharge, discharge instructions were discussed with patient at bedside. Patient was provided both verbal and written instructions. Patient is understanding of the discharge instructions and is agreeable to plan of care. Return precautions were discussed with patient bedside, patient verbalized understanding of signs and symptoms that would necessitate return to the ED. All questions were answered. Patient was comfortable with the plan of care and discharged to home.         Problems Addressed:  COPD exacerbation (HCC): acute illness or injury  Upper respiratory infection: acute illness or injury    Amount and/or Complexity of Data Reviewed  Labs: ordered. Decision-making details documented in ED Course.  Radiology: ordered and independent interpretation performed. Decision-making details documented in ED  Course.    Risk  Prescription drug management.        ED Course as of 11/12/24 0723   Tue Nov 12, 2024   0038 hs TnI 0hr: 5   0043 SARS COV Rapid Antigen: Negative   0043 Influenza A Rapid Antigen: Negative   0043 Influenza B Rapid Antigen: Negative   0206 CT chest without contrast  No focal airspace consolidation or effusion.     Mild diffuse bronchial wall thickening compatible with bronchitis. Minor groundglass opacity in the right upper lobe inferior segment may reflect infectious versus inflammatory pneumonitis. Clinical correlation recommended.     Background emphysema/COPD     0235 Delta 2hr hsTnI: 0       Medications   ipratropium-albuterol (DUO-NEB) 0.5-2.5 mg/3 mL inhalation solution 3 mL (3 mL Nebulization Given 11/12/24 0020)   ipratropium-albuterol (DUO-NEB) 0.5-2.5 mg/3 mL inhalation solution 3 mL (3 mL Nebulization Given 11/12/24 0138)   predniSONE tablet 40 mg (40 mg Oral Given 11/12/24 0138)   ceftriaxone (ROCEPHIN) 1 g/50 mL in dextrose IVPB (0 mg Intravenous Stopped 11/12/24 0208)   azithromycin (ZITHROMAX) tablet 500 mg (500 mg Oral Given 11/12/24 0255)   albuterol (PROVENTIL HFA,VENTOLIN HFA) inhaler 2 puff (2 puffs Inhalation Given 11/12/24 0255)       ED Risk Strat Scores   HEART Risk Score      Flowsheet Row Most Recent Value   Heart Score Risk Calculator    History 0 Filed at: 11/12/2024 0723   ECG 1 Filed at: 11/12/2024 0723   Age 2 Filed at: 11/12/2024 0723   Risk Factors 1 Filed at: 11/12/2024 0723   Troponin 0 Filed at: 11/12/2024 0723   HEART Score 4 Filed at: 11/12/2024 0723                               SBIRT 20yo+      Flowsheet Row Most Recent Value   Initial Alcohol Screen: US AUDIT-C     1. How often do you have a drink containing alcohol? 0 Filed at: 11/11/2024 2243   2. How many drinks containing alcohol do you have on a typical day you are drinking?  0 Filed at: 11/11/2024 2243   3a. Male UNDER 65: How often do you have five or more drinks on one occasion? 0 Filed at:  11/11/2024 2243   3b. FEMALE Any Age, or MALE 65+: How often do you have 4 or more drinks on one occassion? 0 Filed at: 11/11/2024 2243   Audit-C Score 0 Filed at: 11/11/2024 2243   DIANA: How many times in the past year have you...    Used an illegal drug or used a prescription medication for non-medical reasons? Never Filed at: 11/11/2024 2243                            History of Present Illness       Chief Complaint   Patient presents with    Cough     Pt reports severe chest congestion. PT reports when she coughs she has a lot of pain. Pt with congestive non productive cough. Pt reports starting with cough approx 3 days ago. Pt reports usually smoking 1 pack a day, but hasn't smoked much this week.        Past Medical History:   Diagnosis Date    Anxiety     Breast cancer (HCC) 2022    right    Colon cancer (HCC) 2015    chemo and surgery    Depression     Diabetes mellitus (HCC)     Ductal carcinoma in situ (DCIS) of right breast 03/10/2022    History of chemotherapy     History of radiation therapy 2022    right breast    Hyperlipidemia     Hypertension     Skin cancer (melanoma) (HCC)       Past Surgical History:   Procedure Laterality Date    APPENDECTOMY      BACK SURGERY      BREAST BIOPSY Right 01/26/2022    right    BREAST LUMPECTOMY Right 3/1/2022    Procedure: RIGHT BREAST JOHN  DIRECTED LUMPECTOMY;  Surgeon: Dotty Ledezma MD;  Location: [facility];  Service: Surgical Oncology    BREAST LUMPECTOMY Right 4/19/2022    Procedure: LUMPECTOMY;  Surgeon: Dotty Ledezma MD;  Location: [facility];  Service: Surgical Oncology    CHOLECYSTECTOMY      COLON SURGERY      COLONOSCOPY      HYSTERECTOMY  2000    MAMMO NEEDLE LOCALIZATION LEFT (ALL INC) Right 2/24/2022    MAMMO STEREOTACTIC BREAST BIOPSY RIGHT (ALL INC) Right 1/26/2022    US GUIDED BREAST BIOPSY LEFT COMPLETE Left 1/26/2022      Family History   Problem Relation Age of Onset    No Known Problems Mother     No Known Problems  Father     No Known Problems Sister     Lung cancer Brother     No Known Problems Maternal Grandmother     No Known Problems Maternal Grandfather     No Known Problems Paternal Grandmother     No Known Problems Paternal Grandfather     No Known Problems Daughter     No Known Problems Maternal Aunt     No Known Problems Paternal Aunt     Other Paternal Aunt         oral cancer    Breast cancer Paternal Aunt         age unknown    Breast cancer Paternal Aunt         age unknown    Breast cancer Cousin         age unknown    Stomach cancer Paternal Uncle     No Known Problems Son     No Known Problems Son       Social History     Tobacco Use    Smoking status: Every Day     Current packs/day: 1.00     Average packs/day: 1 pack/day for 56.9 years (56.9 ttl pk-yrs)     Types: Cigarettes     Start date: 1/1/1968    Smokeless tobacco: Never   Vaping Use    Vaping status: Never Used   Substance Use Topics    Alcohol use: Never    Drug use: Never      E-Cigarette/Vaping    E-Cigarette Use Never User       E-Cigarette/Vaping Substances    Nicotine No     THC No     CBD No     Flavoring No     Other No     Unknown No       I have reviewed and agree with the history as documented.     The patient is a 74 y.o. female with a history of anxiety, breast cancer, colon cancer, depression, diabetes, hyperlipidemia, hypertension who presents to [facility] with a chief complaint of cough. Symptoms began 3 days ago and have been constant since onset. her pain is currently rated as a 7/10 in severity and described as sharp when she coughs without radiation. Associated symptoms include chest pain while coughing and shortness of breath. Symptoms are aggravated with none noted and alleviating factors include none noted. The patient denies fever, night sweats, hemoptysis. No other reported symptoms at this time.  Patient affirms allergies to codeine and morphine            History provided by:  Patient    used: No    Cough  Associated symptoms: shortness of breath    Associated symptoms: no chest pain, no chills, no ear pain, no fever, no rash and no sore throat        Review of Systems   Constitutional:  Negative for chills and fever.   HENT:  Negative for ear pain and sore throat.    Eyes:  Negative for pain and visual disturbance.   Respiratory:  Positive for cough and shortness of breath.    Cardiovascular:  Negative for chest pain and palpitations.   Gastrointestinal:  Negative for abdominal pain and vomiting.   Genitourinary:  Negative for dysuria and hematuria.   Musculoskeletal:  Negative for arthralgias and back pain.   Skin:  Negative for color change and rash.   Neurological:  Negative for seizures and syncope.   All other systems reviewed and are negative.          Objective       ED Triage Vitals [11/11/24 2241]   Temperature Pulse Blood Pressure Respirations SpO2 Patient Position - Orthostatic VS   97.5 °F (36.4 °C) 60 148/67 19 96 % Lying      Temp Source Heart Rate Source BP Location FiO2 (%) Pain Score    Oral Monitor Left arm -- --      Vitals      Date and Time Temp Pulse SpO2 Resp BP Pain Score FACES Pain Rating User   11/12/24 0300 -- 62 95 % 16 142/62 -- -- MR   11/11/24 2241 97.5 °F (36.4 °C) 60 96 % 19 148/67 -- -- LIBORIO            Physical Exam  Vitals reviewed.   Constitutional:       General: She is not in acute distress.     Appearance: She is not toxic-appearing.   HENT:      Head: Normocephalic.      Right Ear: Tympanic membrane, ear canal and external ear normal.      Left Ear: Tympanic membrane, ear canal and external ear normal.      Nose: Nose normal.      Mouth/Throat:      Mouth: Mucous membranes are moist.      Pharynx: Oropharynx is clear.   Eyes:      General: No scleral icterus.     Conjunctiva/sclera: Conjunctivae normal.      Pupils: Pupils are equal, round, and reactive to light.   Neck:      Vascular: No carotid bruit.   Cardiovascular:      Rate and Rhythm: Normal rate.       Pulses: Normal pulses.   Pulmonary:      Effort: Pulmonary effort is normal. No respiratory distress.      Breath sounds: No stridor. Wheezing present. No rhonchi or rales.   Abdominal:      General: Abdomen is flat. Bowel sounds are normal.      Tenderness: There is no abdominal tenderness.   Musculoskeletal:         General: No swelling, deformity or signs of injury.      Cervical back: Normal range of motion and neck supple. No rigidity or tenderness.      Right lower leg: No edema.      Left lower leg: No edema.   Skin:     General: Skin is warm and dry.      Capillary Refill: Capillary refill takes less than 2 seconds.      Coloration: Skin is not jaundiced.      Findings: No bruising or lesion.   Neurological:      Mental Status: She is alert and oriented to person, place, and time. Mental status is at baseline.         Results Reviewed       Procedure Component Value Units Date/Time    HS Troponin I 2hr [[id number]]  (Normal) Collected: 11/12/24 0203    Lab Status: Final result Specimen: Blood from Arm, Left Updated: 11/12/24 0233     hs TnI 2hr 5 ng/L      Delta 2hr hsTnI 0 ng/L     Manual Differential(PHLEBS Do Not Order) [[id number]]  (Abnormal) Collected: 11/12/24 0005    Lab Status: Final result Specimen: Blood from Arm, Left Updated: 11/12/24 0047     Segmented % 50 %      Bands % 3 %      Lymphocytes % 28 %      Monocytes % 11 %      Eosinophils % 1 %      Basophils % 1 %      Atypical Lymphocytes % 6 %      Absolute Neutrophils 3.31 Thousand/uL      Absolute Lymphocytes 2.12 Thousand/uL      Absolute Monocytes 0.69 Thousand/uL      Absolute Eosinophils 0.06 Thousand/uL      Absolute Basophils 0.06 Thousand/uL      Total Counted --     RBC Morphology Normal     Platelet Estimate Adequate    FLU/COVID Rapid Antigen (30 min. TAT) - Preferred screening test in ED [[id number]]  (Normal) Collected: 11/12/24 0005    Lab Status: Final result Specimen: Nares from Nose Updated: 11/12/24 0043     SARS COV Rapid  Antigen Negative     Influenza A Rapid Antigen Negative     Influenza B Rapid Antigen Negative    Narrative:      This test has been performed using the [employer] Flory 2 FLU+SARS Antigen test under the Emergency Use Authorization (EUA). This test has been validated by the  and verified by the performing laboratory. The Flory uses lateral flow immunofluorescent sandwich assay to detect SARS-COV, Influenza A and Influenza B Antigen.     The [employer] Flory 2 SARS Antigen test does not differentiate between SARS-CoV and SARS-CoV-2.     Negative results are presumptive and may be confirmed with a molecular assay, if necessary, for patient management. Negative results do not rule out SARS-CoV-2 or influenza infection and should not be used as the sole basis for treatment or patient management decisions. A negative test result may occur if the level of antigen in a sample is below the limit of detection of this test.     Positive results are indicative of the presence of viral antigens, but do not rule out bacterial infection or co-infection with other viruses.     All test results should be used as an adjunct to clinical observations and other information available to the provider.    FOR PEDIATRIC PATIENTS - copy/paste COVID Guidelines URL to browser: [email or web address]    Procalcitonin [[id number]]  (Normal) Collected: 11/12/24 0005    Lab Status: Final result Specimen: Blood from Arm, Left Updated: 11/12/24 0037     Procalcitonin <0.05 ng/ml     HS Troponin 0hr (reflex protocol) [[id number]]  (Normal) Collected: 11/12/24 0005    Lab Status: Final result Specimen: Blood from Arm, Left Updated: 11/12/24 0036     hs TnI 0hr 5 ng/L     Protime-INR [[id number]]  (Normal) Collected: 11/12/24 0005    Lab Status: Final result Specimen: Blood from Arm, Left Updated: 11/12/24 0034     Protime 12.7 seconds      INR 0.88    Narrative:      INR Therapeutic  Range    Indication                                             INR Range      Atrial Fibrillation                                               2.0-3.0  Hypercoagulable State                                    2.0.2.3  Left Ventricular Asist Device                            2.0-3.0  Mechanical Heart Valve                                  -    Aortic(with afib, MI, embolism, HF, LA enlargement,    and/or coagulopathy)                                     2.0-3.0 (2.5-3.5)     Mitral                                                             2.5-3.5  Prosthetic/Bioprosthetic Heart Valve               2.0-3.0  Venous thromboembolism (VTE: VT, PE        2.0-3.0    APTT [[id number]]  (Abnormal) Collected: 11/12/24 0005    Lab Status: Final result Specimen: Blood from Arm, Left Updated: 11/12/24 0034     PTT 37 seconds     B-Type Natriuretic Peptide(BNP) [[id number]]  (Normal) Collected: 11/12/24 0005    Lab Status: Final result Specimen: Blood from Arm, Left Updated: 11/12/24 0034     BNP 17 pg/mL     Comprehensive metabolic panel [[id number]]  (Abnormal) Collected: 11/12/24 0005    Lab Status: Final result Specimen: Blood from Arm, Left Updated: 11/12/24 0027     Sodium 129 mmol/L      Potassium 4.3 mmol/L      Chloride 96 mmol/L      CO2 25 mmol/L      ANION GAP 8 mmol/L      BUN 25 mg/dL      Creatinine 1.07 mg/dL      Glucose 246 mg/dL      Calcium 8.6 mg/dL      AST 24 U/L      ALT 23 U/L      Alkaline Phosphatase 79 U/L      Total Protein 6.4 g/dL      Albumin 3.7 g/dL      Total Bilirubin 0.24 mg/dL      eGFR 51 ml/min/1.73sq m     Narrative:      National Kid[employer] Foundation guidelines for Chronic Kidney Disease (CKD):     Stage 1 with normal or high GFR (GFR > 90 mL/min/1.73 square meters)    Stage 2 Mild CKD (GFR = 60-89 mL/min/1.73 square meters)    Stage 3A Moderate CKD (GFR = 45-59 mL/min/1.73 square meters)    Stage 3B Moderate CKD (GFR = 30-44 mL/min/1.73 square meters)    Stage 4 Severe CKD (GFR =  15-29 mL/min/1.73 square meters)    Stage 5 End Stage CKD (GFR <15 mL/min/1.73 square meters)  Note: GFR calculation is accurate only with a steady state creatinine    CBC and differential [[id number]]  (Normal) Collected: 11/12/24 0005    Lab Status: Final result Specimen: Blood from Arm, Left Updated: 11/12/24 0013     WBC 6.24 Thousand/uL      RBC 4.41 Million/uL      Hemoglobin 12.7 g/dL      Hematocrit 38.7 %      MCV 88 fL      MCH 28.8 pg      MCHC 32.8 g/dL      RDW 12.9 %      MPV 9.4 fL      Platelets 253 Thousands/uL             CT chest without contrast   Final Interpretation by Nahum Jauregui MD (11/12 0201)      No focal airspace consolidation or effusion.      Mild diffuse bronchial wall thickening compatible with bronchitis. Minor groundglass opacity in the right upper lobe inferior segment may reflect infectious versus inflammatory pneumonitis. Clinical correlation recommended.      Background emphysema/COPD.         Workstation performed: [id number]         XR chest 2 views   ED Interpretation by Puma Patricio PA-C (11/12 0226)   No acute cardiopulmonary disease          Procedures    ED Medication and Procedure Management   Prior to Admission Medications   Prescriptions Last Dose Informant Patient Reported? Taking?   HYDROcodone-acetaminophen (Norco) 5-325 mg per tablet  Child, Self No No   Sig: Take 1 tablet by mouth every 6 (six) hours as needed for pain   Melatonin 10 MG TABS  Child, Self Yes No   Sig: Take 20 mg by mouth daily at bedtime as needed   acetaminophen (TYLENOL) 650 mg CR tablet  Child, Self Yes No   Sig: Take 650 mg by mouth as needed for mild pain   amLODIPine (NORVASC) 2.5 mg tablet  Child, Self Yes No   Sig: Take 2.5 mg by mouth daily     Patient not taking: Reported on 8/27/2024   ammonium lactate (LAC-HYDRIN) 12 % cream  Child, Self Yes No   Sig: APPLY TO DRY SKIN ON FEET AND LOWER LEGS   atorvastatin (LIPITOR) 20 mg tablet  Child, Self Yes No   Sig: Take 40 mg by mouth daily    donepezil (ARICEPT) 10 mg tablet   No No   Sig: Take 1 tablet (10 mg total) by mouth daily at bedtime   fenofibrate (TRICOR) 145 mg tablet  Child, Self Yes No   Sig: Take 145 mg by mouth daily   gabapentin (NEURONTIN) 100 mg capsule  Child, Self Yes No   Sig: Take 100 mg by mouth 3 (three) times a day   glimepiride (AMARYL) 4 mg tablet  Child, Self Yes No   Sig: Take 4 mg by mouth daily with breakfast   hydrochlorothiazide (HYDRODIURIL) 12.5 mg tablet  Child, Self Yes No   Sig: Take 12.5 mg by mouth daily   irbesartan (AVAPRO) 300 mg tablet  Child, Self Yes No   Sig: Take 300 mg by mouth daily   letrozole (FEMARA) 2.5 mg tablet  Child, Self No No   Sig: TAKE 1 TABLET BY MOUTH EVERY DAY   Patient not taking: Reported on 8/27/2024   magnesium oxide (MAG-OX) 400 mg tablet  Child, Self Yes No   Sig: Take 1 tablet by mouth daily   memantine (NAMENDA) 10 mg tablet   No No   Sig: TAKE 1 TABLET (10 MG TOTAL) BY MOUTH 2 (TWO) TIMES A DAY TO START AFTER FINISHING NAMENDA TITRATION PACK.   metFORMIN (GLUCOPHAGE) 1000 MG tablet  Child, Self Yes No   Sig: Take 1,000 mg by mouth 2 (two) times a day with meals     metoprolol succinate (TOPROL-XL) 50 mg 24 hr tablet  Child, Self Yes No   Sig: Take 75 mg by mouth daily   omeprazole (PriLOSEC) 40 MG capsule  Child, Self Yes No   Sig: Take 40 mg by mouth daily   Patient not taking: Reported on 8/27/2024   ondansetron (Zofran ODT) 4 mg disintegrating tablet  Child, Self No No   Sig: Take 1 tablet (4 mg total) by mouth every 8 (eight) hours as needed for nausea or vomiting   oxyCODONE-acetaminophen (Percocet) 5-325 mg per tablet  Child, Self No No   Sig: Take 1 tablet by mouth every 6 (six) hours as needed for moderate pain Max Daily Amount: 4 tablets   oxybutynin (DITROPAN) 5 mg tablet  Child, Self Yes No   Sig: TAKE 1 TABLET BY MOUTH EVERY DAY AT NIGHT   primidone (MYSOLINE) 50 mg tablet   No No   Sig: TAKE 2 TABLET in the morning and 2 tablets at nighttime   sitaGLIPtin (JANUVIA)  100 mg tablet  Child, Self Yes No   Sig: Take 100 mg by mouth daily      Facility-Administered Medications: None     Discharge Medication List as of 11/12/2024  3:14 AM        START taking these medications    Details   amoxicillin-clavulanate (AUGMENTIN) 875-125 mg per tablet Take 1 tablet by mouth every 12 (twelve) hours for 7 days, Starting Tue 11/12/2024, Until Tue 11/19/2024, Normal      azithromycin (ZITHROMAX) 250 mg tablet Take 2 tablets today then 1 tablet daily x 4 days, Normal           CONTINUE these medications which have NOT CHANGED    Details   acetaminophen (TYLENOL) 650 mg CR tablet Take 650 mg by mouth as needed for mild pain, Historical Med      amLODIPine (NORVASC) 2.5 mg tablet Take 2.5 mg by mouth daily  , Starting Tue 7/27/2021, Historical Med      ammonium lactate (LAC-HYDRIN) 12 % cream APPLY TO DRY SKIN ON FEET AND LOWER LEGS, Historical Med      atorvastatin (LIPITOR) 20 mg tablet Take 40 mg by mouth daily, Starting Sun 2/6/2022, Historical Med      donepezil (ARICEPT) 10 mg tablet Take 1 tablet (10 mg total) by mouth daily at bedtime, Starting Tue 8/27/2024, Normal      fenofibrate (TRICOR) 145 mg tablet Take 145 mg by mouth daily, Starting Thu 3/17/2022, Historical Med      gabapentin (NEURONTIN) 100 mg capsule Take 100 mg by mouth 3 (three) times a day, Historical Med      glimepiride (AMARYL) 4 mg tablet Take 4 mg by mouth daily with breakfast, Historical Med      hydrochlorothiazide (HYDRODIURIL) 12.5 mg tablet Take 12.5 mg by mouth daily, Historical Med      HYDROcodone-acetaminophen (Norco) 5-325 mg per tablet Take 1 tablet by mouth every 6 (six) hours as needed for pain, Starting Wed 2/9/2022, Normal      irbesartan (AVAPRO) 300 mg tablet Take 300 mg by mouth daily, Starting Wed 7/21/2021, Historical Med      letrozole (FEMARA) 2.5 mg tablet TAKE 1 TABLET BY MOUTH EVERY DAY, Normal      magnesium oxide (MAG-OX) 400 mg tablet Take 1 tablet by mouth daily, Starting Tue 10/25/2022,  Historical Med      Melatonin 10 MG TABS Take 20 mg by mouth daily at bedtime as needed, Historical Med      memantine (NAMENDA) 10 mg tablet TAKE 1 TABLET (10 MG TOTAL) BY MOUTH 2 (TWO) TIMES A DAY TO START AFTER FINISHING NAMENDA TITRATION PACK., Starting Wed 9/18/2024, Normal      metFORMIN (GLUCOPHAGE) 1000 MG tablet Take 1,000 mg by mouth 2 (two) times a day with meals  , Historical Med      metoprolol succinate (TOPROL-XL) 50 mg 24 hr tablet Take 75 mg by mouth daily, Historical Med      omeprazole (PriLOSEC) 40 MG capsule Take 40 mg by mouth daily, Historical Med      ondansetron (Zofran ODT) 4 mg disintegrating tablet Take 1 tablet (4 mg total) by mouth every 8 (eight) hours as needed for nausea or vomiting, Starting Tue 1/31/2023, Normal      oxybutynin (DITROPAN) 5 mg tablet TAKE 1 TABLET BY MOUTH EVERY DAY AT NIGHT, Historical Med      oxyCODONE-acetaminophen (Percocet) 5-325 mg per tablet Take 1 tablet by mouth every 6 (six) hours as needed for moderate pain Max Daily Amount: 4 tablets, Starting Tue 4/19/2022, Normal      primidone (MYSOLINE) 50 mg tablet TAKE 2 TABLET in the morning and 2 tablets at nighttime, Normal      sitaGLIPtin (JANUVIA) 100 mg tablet Take 100 mg by mouth daily, Historical Med             ED SEPSIS DOCUMENTATION   Time reflects when diagnosis was documented in both MDM as applicable and the Disposition within this note       Time User Action Codes Description Comment    11/12/2024  2:52 AM Puma Patricio [J06.9] Upper respiratory infection     11/12/2024  2:53 AM Puma Patricio [J44.1] COPD exacerbation (HCC)                  Puma Patricio PA-C  11/12/24 0723

## 2025-01-11 ENCOUNTER — HOSPITAL ENCOUNTER (EMERGENCY)
Facility: HOSPITAL | Age: 75
Discharge: HOME | End: 2025-01-11
Attending: EMERGENCY MEDICINE
Payer: COMMERCIAL

## 2025-01-11 ENCOUNTER — APPOINTMENT (EMERGENCY)
Dept: RADIOLOGY | Facility: HOSPITAL | Age: 75
End: 2025-01-11
Payer: COMMERCIAL

## 2025-01-11 ENCOUNTER — APPOINTMENT (EMERGENCY)
Dept: CT IMAGING | Facility: HOSPITAL | Age: 75
End: 2025-01-11
Payer: COMMERCIAL

## 2025-01-11 VITALS
SYSTOLIC BLOOD PRESSURE: 161 MMHG | OXYGEN SATURATION: 96 % | HEART RATE: 71 BPM | DIASTOLIC BLOOD PRESSURE: 76 MMHG | TEMPERATURE: 98.2 F | RESPIRATION RATE: 19 BRPM
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
## 2025-01-11 DIAGNOSIS — U07.1 COVID-19: Primary | ICD-10-CM

## 2025-01-11 DIAGNOSIS — E83.42 HYPOMAGNESEMIA: ICD-10-CM

## 2025-01-11 LAB
ALBUMIN SERPL BCG-MCNC: 3.7 G/DL (ref 3.5–5)
ALP SERPL-CCNC: 59 U/L (ref 34–104)
ALT SERPL W P-5'-P-CCNC: 20 U/L (ref 7–52)
ANION GAP SERPL CALCULATED.3IONS-SCNC: 11 MMOL/L (ref 4–13)
AST SERPL W P-5'-P-CCNC: 29 U/L (ref 13–39)
ATRIAL RATE: 71 BPM
BACTERIA UR QL AUTO: ABNORMAL /HPF
BASOPHILS # BLD AUTO: 0.03 THOUSANDS/ΜL (ref 0–0.1)
BASOPHILS NFR BLD AUTO: 0 % (ref 0–1)
BILIRUB SERPL-MCNC: 0.25 MG/DL (ref 0.2–1)
BILIRUB UR QL STRIP: NEGATIVE
BUN SERPL-MCNC: 21 MG/DL (ref 5–25)
CALCIUM SERPL-MCNC: 8.2 MG/DL (ref 8.4–10.2)
CARDIAC TROPONIN I PNL SERPL HS: 8 NG/L (ref ?–50)
CHLORIDE SERPL-SCNC: 100 MMOL/L (ref 96–108)
CLARITY UR: CLEAR
CO2 SERPL-SCNC: 21 MMOL/L (ref 21–32)
COLOR UR: YELLOW
CREAT SERPL-MCNC: 0.89 MG/DL (ref 0.6–1.3)
EOSINOPHIL # BLD AUTO: 0.01 THOUSAND/ΜL (ref 0–0.61)
EOSINOPHIL NFR BLD AUTO: 0 % (ref 0–6)
ERYTHROCYTE [DISTWIDTH] IN BLOOD BY AUTOMATED COUNT: 13 % (ref 11.6–15.1)
FLUAV AG UPPER RESP QL IA.RAPID: NEGATIVE
FLUBV AG UPPER RESP QL IA.RAPID: NEGATIVE
GFR SERPL CREATININE-BSD FRML MDRD: 64 ML/MIN/1.73SQ M
GLUCOSE SERPL-MCNC: 202 MG/DL (ref 65–140)
GLUCOSE SERPL-MCNC: 208 MG/DL (ref 65–140)
GLUCOSE UR STRIP-MCNC: ABNORMAL MG/DL
HCT VFR BLD AUTO: 36.3 % (ref 34.8–46.1)
HGB BLD-MCNC: 11.8 G/DL (ref 11.5–15.4)
HGB UR QL STRIP.AUTO: NEGATIVE
HYALINE CASTS #/AREA URNS LPF: ABNORMAL /LPF
IMM GRANULOCYTES # BLD AUTO: 0.03 THOUSAND/UL (ref 0–0.2)
IMM GRANULOCYTES NFR BLD AUTO: 0 % (ref 0–2)
KETONES UR STRIP-MCNC: NEGATIVE MG/DL
LEUKOCYTE ESTERASE UR QL STRIP: ABNORMAL
LYMPHOCYTES # BLD AUTO: 1.14 THOUSANDS/ΜL (ref 0.6–4.47)
LYMPHOCYTES NFR BLD AUTO: 12 % (ref 14–44)
MAGNESIUM SERPL-MCNC: 1.7 MG/DL (ref 1.9–2.7)
MCH RBC QN AUTO: 29 PG (ref 26.8–34.3)
MCHC RBC AUTO-ENTMCNC: 32.5 G/DL (ref 31.4–37.4)
MCV RBC AUTO: 89 FL (ref 82–98)
MONOCYTES # BLD AUTO: 1.33 THOUSAND/ΜL (ref 0.17–1.22)
MONOCYTES NFR BLD AUTO: 14 % (ref 4–12)
MUCOUS THREADS UR QL AUTO: ABNORMAL
NEUTROPHILS # BLD AUTO: 6.73 THOUSANDS/ΜL (ref 1.85–7.62)
NEUTS SEG NFR BLD AUTO: 74 % (ref 43–75)
NITRITE UR QL STRIP: NEGATIVE
NON-SQ EPI CELLS URNS QL MICRO: ABNORMAL /HPF
NRBC BLD AUTO-RTO: 0 /100 WBCS
P AXIS: 59 DEGREES
PH UR STRIP.AUTO: 5.5 [PH]
PLATELET # BLD AUTO: 237 THOUSANDS/UL (ref 149–390)
PMV BLD AUTO: 10.3 FL (ref 8.9–12.7)
POTASSIUM SERPL-SCNC: 3.9 MMOL/L (ref 3.5–5.3)
PR INTERVAL: 194 MS
PROT SERPL-MCNC: 6.4 G/DL (ref 6.4–8.4)
PROT UR STRIP-MCNC: ABNORMAL MG/DL
QRS AXIS: 1 DEGREES
QRSD INTERVAL: 114 MS
QT INTERVAL: 408 MS
QTC INTERVAL: 443 MS
RBC # BLD AUTO: 4.07 MILLION/UL (ref 3.81–5.12)
RBC #/AREA URNS AUTO: ABNORMAL /HPF
SARS-COV+SARS-COV-2 AG RESP QL IA.RAPID: POSITIVE
SODIUM SERPL-SCNC: 132 MMOL/L (ref 135–147)
SP GR UR STRIP.AUTO: 1.02 (ref 1–1.03)
T WAVE AXIS: 70 DEGREES
TSH SERPL DL<=0.05 MIU/L-ACNC: 1.23 UIU/ML (ref 0.45–4.5)
UROBILINOGEN UR STRIP-ACNC: <2 MG/DL
VENTRICULAR RATE: 71 BPM
WBC # BLD AUTO: 9.27 THOUSAND/UL (ref 4.31–10.16)
WBC #/AREA URNS AUTO: ABNORMAL /HPF

## 2025-01-11 PROCEDURE — 99285 EMERGENCY DEPT VISIT HI MDM: CPT | Performed by: EMERGENCY MEDICINE

## 2025-01-11 PROCEDURE — 96360 HYDRATION IV INFUSION INIT: CPT

## 2025-01-11 PROCEDURE — 80053 COMPREHEN METABOLIC PANEL: CPT | Performed by: EMERGENCY MEDICINE

## 2025-01-11 PROCEDURE — 96361 HYDRATE IV INFUSION ADD-ON: CPT

## 2025-01-11 PROCEDURE — 84443 ASSAY THYROID STIM HORMONE: CPT | Performed by: EMERGENCY MEDICINE

## 2025-01-11 PROCEDURE — 84484 ASSAY OF TROPONIN QUANT: CPT | Performed by: EMERGENCY MEDICINE

## 2025-01-11 PROCEDURE — 82948 REAGENT STRIP/BLOOD GLUCOSE: CPT

## 2025-01-11 PROCEDURE — 81001 URINALYSIS AUTO W/SCOPE: CPT | Performed by: EMERGENCY MEDICINE

## 2025-01-11 PROCEDURE — 36415 COLL VENOUS BLD VENIPUNCTURE: CPT | Performed by: EMERGENCY MEDICINE

## 2025-01-11 PROCEDURE — 83735 ASSAY OF MAGNESIUM: CPT | Performed by: EMERGENCY MEDICINE

## 2025-01-11 PROCEDURE — 93010 ELECTROCARDIOGRAM REPORT: CPT | Performed by: INTERNAL MEDICINE

## 2025-01-11 PROCEDURE — 93005 ELECTROCARDIOGRAM TRACING: CPT

## 2025-01-11 PROCEDURE — 87804 INFLUENZA ASSAY W/OPTIC: CPT | Performed by: EMERGENCY MEDICINE

## 2025-01-11 PROCEDURE — 99285 EMERGENCY DEPT VISIT HI MDM: CPT

## 2025-01-11 PROCEDURE — 72125 CT NECK SPINE W/O DYE: CPT

## 2025-01-11 PROCEDURE — 85025 COMPLETE CBC W/AUTO DIFF WBC: CPT | Performed by: EMERGENCY MEDICINE

## 2025-01-11 PROCEDURE — 71045 X-RAY EXAM CHEST 1 VIEW: CPT

## 2025-01-11 PROCEDURE — 87811 SARS-COV-2 COVID19 W/OPTIC: CPT | Performed by: EMERGENCY MEDICINE

## 2025-01-11 RX ORDER — VITAMIN E (DL,TOCOPHERYL ACET) 180 MG
500 CAPSULE ORAL 3 TIMES DAILY
Qty: 90 CAPSULE | Refills: 0 | Status: SHIPPED | OUTPATIENT
Start: 2025-01-11 | End: 2025-02-10

## 2025-01-11 RX ORDER — GUAIFENESIN/DEXTROMETHORPHAN 100-10MG/5
10 SYRUP ORAL 4 TIMES DAILY PRN
Qty: 473 ML | Refills: 0 | Status: SHIPPED | OUTPATIENT
Start: 2025-01-11

## 2025-01-11 RX ADMIN — SODIUM CHLORIDE 1000 ML: 0.9 INJECTION, SOLUTION INTRAVENOUS at 01:49

## 2025-01-11 NOTE — ED PROVIDER NOTES
Time reflects when diagnosis was documented in both MDM as applicable and the Disposition within this note       Time User Action Codes Description Comment    1/11/2025  3:41 AM Roberto Costa Add [U07.1] COVID-19     1/11/2025  3:41 AM Roberto Costa Add [W19.XXXA] Fall from standing, initial encounter     1/11/2025  3:41 AM Roberto Costa Add [E83.42] Hypomagnesemia           ED Disposition       ED Disposition   Discharge    Condition   Stable    Date/Time   Sat Jan 11, 2025  3:41 AM    Comment   Lauren Das discharge to home/self care.                   Assessment & Plan       Medical Decision Making  C-spine x-ray was negative for fracture or subluxation.  Otherwise history and physical examination did not suggest any other acute traumatic injury.  Weakness is likely secondary to COVID.  Patient, however, was able to ambulate in the emergency room without problem.  This is not a stroke.  This is not lumbar or thoracic cord compression.  This is not vascular.  There was mild hypomagnesemia.  Will prescribe replacement.  As far as the COVID goes, no hypoxia.  No pneumonia.  Not candidate for Paxlovid due to medication interactions.    Amount and/or Complexity of Data Reviewed  Independent Historian:      Details: Son  Labs: ordered. Decision-making details documented in ED Course.  Radiology: ordered and independent interpretation performed. Decision-making details documented in ED Course.  ECG/medicine tests: ordered and independent interpretation performed. Decision-making details documented in ED Course.    Risk  OTC drugs.  Prescription drug management.  Decision regarding hospitalization.           Medications   sodium chloride 0.9 % bolus 1,000 mL (0 mL Intravenous Stopped 1/11/25 0341)       ED Risk Strat Scores                          SBIRT 22yo+      Flowsheet Row Most Recent Value   Initial Alcohol Screen: US AUDIT-C     1. How often do you have a drink containing alcohol? 0 Filed at:  "01/11/2025 0118   2. How many drinks containing alcohol do you have on a typical day you are drinking?  0 Filed at: 01/11/2025 0118   3b. FEMALE Any Age, or MALE 65+: How often do you have 4 or more drinks on one occassion? 0 Filed at: 01/11/2025 0118   Audit-C Score 0 Filed at: 01/11/2025 0118   DIANA: How many times in the past year have you...    Used an illegal drug or used a prescription medication for non-medical reasons? Never Filed at: 01/11/2025 0118                            History of Present Illness       Chief Complaint   Patient presents with   • Fall     Pt arrives via EMS w/ family c/o fall from standing. Pt reports getting out of bed when pts legs became weak. Pt reports bilateral leg weakness for \" a couple days\"  Pt denies headstrike, -thinners.       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   • History of chemotherapy    • History of radiation therapy 2022    right breast   • Hyperlipidemia    • Hypertension    • Skin cancer (melanoma) (HCC)       Past Surgical History:   Procedure Laterality Date   • APPENDECTOMY     • BACK SURGERY     • BREAST BIOPSY Right 01/26/2022    right   • BREAST LUMPECTOMY Right 3/1/2022    Procedure: RIGHT BREAST JOHN  DIRECTED LUMPECTOMY;  Surgeon: Dotty Ledezma MD;  Location: [facility];  Service: Surgical Oncology   • BREAST LUMPECTOMY Right 4/19/2022    Procedure: LUMPECTOMY;  Surgeon: Dotty Ledezma MD;  Location: [facility];  Service: Surgical Oncology   • CHOLECYSTECTOMY     • COLON SURGERY     • COLONOSCOPY     • FL GUIDED CENTRAL VENOUS ACCESS DEVICE REMOVAL  7/24/2017   • HYSTERECTOMY  2000   • MAMMO NEEDLE LOCALIZATION LEFT (ALL INC) Right 2/24/2022   • MAMMO STEREOTACTIC BREAST BIOPSY RIGHT (ALL INC) Right 1/26/2022   • US " GUIDED BREAST BIOPSY LEFT COMPLETE Left 1/26/2022      Family History   Problem Relation Age of Onset   • No Known Problems Mother    • No Known Problems Father    • No Known Problems Sister    • Lung cancer Brother    • No Known Problems Maternal Grandmother    • No Known Problems Maternal Grandfather    • No Known Problems Paternal Grandmother    • No Known Problems Paternal Grandfather    • No Known Problems Daughter    • No Known Problems Maternal Aunt    • No Known Problems Paternal Aunt    • Other Paternal Aunt         oral cancer   • Breast cancer Paternal Aunt         age unknown   • Breast cancer Paternal Aunt         age unknown   • Breast cancer Cousin         age unknown   • Stomach cancer Paternal Uncle    • No Known Problems Son    • No Known Problems Son       Social History     Tobacco Use   • Smoking status: Every Day     Current packs/day: 1.00     Average packs/day: 1 pack/day for 57.0 years (57.0 ttl pk-yrs)     Types: Cigarettes     Start date: 1/1/1968   • Smokeless tobacco: Never   Vaping Use   • Vaping status: Never Used   Substance Use Topics   • Alcohol use: Never   • Drug use: Never      E-Cigarette/Vaping   • E-Cigarette Use Never User       E-Cigarette/Vaping Substances   • Nicotine No    • THC No    • CBD No    • Flavoring No    • Other No    • Unknown No       I have reviewed and agree with the history as documented.     Patient is a 74-year-old female.  Past medical history is significant for hypertension, hyperlipidemia, melanoma, breast cancer, and diabetes.  Patient is not on oral anticoagulants.  She has history of memory problems and tremor.  As per son patient has chronic right leg weakness.  Patient has been sick for couple days with cough and congestion.  No reported fever.  Today around 11 PM she fell.  No head strike.  Denies injury.  Patient reports that for the last several days her legs have been really weak.  No numbness or paresthesias.  No paralysis.  No low back  pain.      Review of Systems   Constitutional:  Negative for chills and fever.   HENT:  Positive for congestion. Negative for rhinorrhea and sore throat.    Eyes:  Negative for pain, redness and visual disturbance.   Respiratory:  Positive for cough. Negative for shortness of breath.    Cardiovascular:  Negative for chest pain and leg swelling.   Gastrointestinal:  Negative for abdominal pain, diarrhea and vomiting.   Endocrine: Negative for polydipsia and polyuria.   Genitourinary:  Negative for dysuria, frequency, hematuria, vaginal bleeding and vaginal discharge.   Musculoskeletal:  Negative for back pain and neck pain.   Skin:  Negative for rash and wound.   Allergic/Immunologic: Negative for immunocompromised state.   Neurological:  Positive for tremors and weakness. Negative for numbness and headaches.   Hematological:  Does not bruise/bleed easily.   Psychiatric/Behavioral:  Negative for hallucinations and suicidal ideas.    All other systems reviewed and are negative.          Objective       ED Triage Vitals   Temperature Pulse Blood Pressure Respirations SpO2 Patient Position - Orthostatic VS   01/11/25 0117 01/11/25 0112 01/11/25 0112 01/11/25 0112 01/11/25 0112 01/11/25 0200   98.2 °F (36.8 °C) 72 (!) 192/78 19 97 % Lying      Temp Source Heart Rate Source BP Location FiO2 (%) Pain Score    01/11/25 0117 01/11/25 0112 01/11/25 0200 -- --    Oral Monitor Left arm        Vitals      Date and Time Temp Pulse SpO2 Resp BP Pain Score FACES Pain Rating User   01/11/25 0200 -- 71 96 % 19 161/76 -- -- LIBORIO   01/11/25 0117 98.2 °F (36.8 °C) -- -- -- -- -- -- LIBORIO   01/11/25 0112 -- 72 97 % 19 192/78 -- -- LIBORIO            Physical Exam  Vitals reviewed.   Constitutional:       General: She is not in acute distress.  HENT:      Head: Normocephalic and atraumatic.      Nose: Nose normal.      Mouth/Throat:      Mouth: Mucous membranes are moist.   Eyes:      General:         Right eye: No discharge.         Left eye:  No discharge.      Conjunctiva/sclera: Conjunctivae normal.   Neck:      Comments: There is midline cervical tenderness.  Cardiovascular:      Rate and Rhythm: Normal rate and regular rhythm.      Pulses: Normal pulses.      Heart sounds: Normal heart sounds. No murmur heard.     No friction rub. No gallop.   Pulmonary:      Effort: Pulmonary effort is normal. No respiratory distress.      Breath sounds: Normal breath sounds. No stridor. No wheezing, rhonchi or rales.   Abdominal:      General: Bowel sounds are normal. There is no distension.      Palpations: Abdomen is soft.      Tenderness: There is no abdominal tenderness. There is no right CVA tenderness, left CVA tenderness, guarding or rebound.   Musculoskeletal:         General: No swelling, deformity or signs of injury. Normal range of motion.      Cervical back: Normal range of motion and neck supple. Tenderness present. No rigidity.      Right lower leg: No edema.      Left lower leg: No edema.      Comments: No calf tenderness or unilateral leg swelling.  No midline thoracic or lumbar tenderness.  Pelvis is stable.  Extremities are atraumatic.   Skin:     General: Skin is warm and dry.      Coloration: Skin is not jaundiced.      Findings: No rash.      Comments: Generalized dry skin with flaking.   Neurological:      General: No focal deficit present.      Mental Status: She is alert and oriented to person, place, and time.      GCS: GCS eye subscore is 4. GCS verbal subscore is 5. GCS motor subscore is 6.      Sensory: No sensory deficit.      Motor: Weakness present.      Comments: There is isolated right leg weakness.   Psychiatric:         Mood and Affect: Mood normal.         Behavior: Behavior normal.       Results Reviewed       Procedure Component Value Units Date/Time    Urine Microscopic [[id number]]  (Abnormal) Collected: 01/11/25 0302    Lab Status: Final result Specimen: Urine, Clean Catch Updated: 01/11/25 0321     RBC, UA 2-4 /hpf      WBC,  UA 2-4 /hpf      Epithelial Cells Occasional /hpf      Bacteria, UA Moderate /hpf      MUCUS THREADS Occasional     Hyaline Casts, UA 0-3 /lpf     UA w Reflex to Microscopic w Reflex to Culture [[id number]]  (Abnormal) Collected: 01/11/25 0302    Lab Status: Final result Specimen: Urine, Clean Catch Updated: 01/11/25 0311     Color, UA Yellow     Clarity, UA Clear     Specific Gravity, UA 1.024     pH, UA 5.5     Leukocytes, UA Moderate     Nitrite, UA Negative     Protein, UA 30 (1+) mg/dl      Glucose,  (1/5%) mg/dl      Ketones, UA Negative mg/dl      Urobilinogen, UA <2.0 mg/dl      Bilirubin, UA Negative     Occult Blood, UA Negative    HS Troponin I 2hr [[id number]] Updated: 01/11/25 0303    Lab Status: No result Specimen: Blood from Arm, Right     TSH, 3rd generation with Free T4 reflex [[id number]]  (Normal) Collected: 01/11/25 0146    Lab Status: Final result Specimen: Blood from Arm, Right Updated: 01/11/25 0230     TSH 3RD GENERATON 1.234 uIU/mL     HS Troponin 0hr (reflex protocol) [[id number]]  (Normal) Collected: 01/11/25 0146    Lab Status: Final result Specimen: Blood from Arm, Right Updated: 01/11/25 0215     hs TnI 0hr 8 ng/L     Comprehensive metabolic panel [[id number]]  (Abnormal) Collected: 01/11/25 0146    Lab Status: Final result Specimen: Blood from Arm, Right Updated: 01/11/25 0212     Sodium 132 mmol/L      Potassium 3.9 mmol/L      Chloride 100 mmol/L      CO2 21 mmol/L      ANION GAP 11 mmol/L      BUN 21 mg/dL      Creatinine 0.89 mg/dL      Glucose 208 mg/dL      Calcium 8.2 mg/dL      AST 29 U/L      ALT 20 U/L      Alkaline Phosphatase 59 U/L      Total Protein 6.4 g/dL      Albumin 3.7 g/dL      Total Bilirubin 0.25 mg/dL      eGFR 64 ml/min/1.73sq m     Narrative:      National Kid[employer] Foundation guidelines for Chronic Kidney Disease (CKD):   •  Stage 1 with normal or high GFR (GFR > 90 mL/min/1.73 square meters)  •  Stage 2 Mild CKD (GFR = 60-89 mL/min/1.73 square  meters)  •  Stage 3A Moderate CKD (GFR = 45-59 mL/min/1.73 square meters)  •  Stage 3B Moderate CKD (GFR = 30-44 mL/min/1.73 square meters)  •  Stage 4 Severe CKD (GFR = 15-29 mL/min/1.73 square meters)  •  Stage 5 End Stage CKD (GFR <15 mL/min/1.73 square meters)  Note: GFR calculation is accurate only with a steady state creatinine    Magnesium [[id number]]  (Abnormal) Collected: 01/11/25 0146    Lab Status: Final result Specimen: Blood from Arm, Right Updated: 01/11/25 0212     Magnesium 1.7 mg/dL     FLU/COVID Rapid Antigen (30 min. TAT) - Preferred screening test in ED [[id number]]  (Abnormal) Collected: 01/11/25 0146    Lab Status: Final result Specimen: Nares from Nose Updated: 01/11/25 0209     SARS COV Rapid Antigen Positive     Influenza A Rapid Antigen Negative     Influenza B Rapid Antigen Negative    Narrative:      This test has been performed using the [employer] Flory 2 FLU+SARS Antigen test under the Emergency Use Authorization (EUA). This test has been validated by the  and verified by the performing laboratory. The Flory uses lateral flow immunofluorescent sandwich assay to detect SARS-COV, Influenza A and Influenza B Antigen.     The Quidel Flory 2 SARS Antigen test does not differentiate between SARS-CoV and SARS-CoV-2.     Negative results are presumptive and may be confirmed with a molecular assay, if necessary, for patient management. Negative results do not rule out SARS-CoV-2 or influenza infection and should not be used as the sole basis for treatment or patient management decisions. A negative test result may occur if the level of antigen in a sample is below the limit of detection of this test.     Positive results are indicative of the presence of viral antigens, but do not rule out bacterial infection or co-infection with other viruses.     All test results should be used as an adjunct to clinical observations and other information available to the provider.    FOR PEDIATRIC  PATIENTS - copy/paste COVID Guidelines URL to browser: [email or web address]    CBC and differential [[id number]]  (Abnormal) Collected: 01/11/25 0146    Lab Status: Final result Specimen: Blood from Arm, Right Updated: 01/11/25 0152     WBC 9.27 Thousand/uL      RBC 4.07 Million/uL      Hemoglobin 11.8 g/dL      Hematocrit 36.3 %      MCV 89 fL      MCH 29.0 pg      MCHC 32.5 g/dL      RDW 13.0 %      MPV 10.3 fL      Platelets 237 Thousands/uL      nRBC 0 /100 WBCs      Segmented % 74 %      Immature Grans % 0 %      Lymphocytes % 12 %      Monocytes % 14 %      Eosinophils Relative 0 %      Basophils Relative 0 %      Absolute Neutrophils 6.73 Thousands/µL      Absolute Immature Grans 0.03 Thousand/uL      Absolute Lymphocytes 1.14 Thousands/µL      Absolute Monocytes 1.33 Thousand/µL      Eosinophils Absolute 0.01 Thousand/µL      Basophils Absolute 0.03 Thousands/µL     Fingerstick Glucose (POCT) [[id number]]  (Abnormal) Collected: 01/11/25 0146    Lab Status: Final result Specimen: Blood Updated: 01/11/25 0146     POC Glucose 202 mg/dl             XR chest 1 view portable   ED Interpretation by Roberto Costa MD (01/11 0227)   No infiltrates.  No CHF.      CT cervical spine without contrast   Final Interpretation by Buddy Israel MD (01/11 0248)      No cervical spine fracture or traumatic malalignment.                  Workstation performed: [id number]             ECG 12 Lead Documentation Only    Date/Time: 1/11/2025 2:11 AM    Performed by: Roberto Costa MD  Authorized by: Roberto Costa MD    ECG reviewed by me, the ED Provider: yes    Patient location:  ED  Interpretation:     Interpretation: normal    Rate:     ECG rate assessment: normal    Rhythm:     Rhythm: sinus rhythm    Ectopy:     Ectopy: none    QRS:     QRS axis:  Normal  Conduction:     Conduction: normal    ST segments:     ST segments:  Normal  T waves:     T waves: normal        ED  Medication and Procedure Management   Prior to Admission Medications   Prescriptions Last Dose Informant Patient Reported? Taking?   HYDROcodone-acetaminophen (Norco) 5-325 mg per tablet  Child, Self No No   Sig: Take 1 tablet by mouth every 6 (six) hours as needed for pain   Melatonin 10 MG TABS  Child, Self Yes No   Sig: Take 20 mg by mouth daily at bedtime as needed   acetaminophen (TYLENOL) 650 mg CR tablet  Child, Self Yes No   Sig: Take 650 mg by mouth as needed for mild pain   amLODIPine (NORVASC) 2.5 mg tablet  Child, Self Yes No   Sig: Take 2.5 mg by mouth daily     Patient not taking: Reported on 8/27/2024   ammonium lactate (LAC-HYDRIN) 12 % cream  Child, Self Yes No   Sig: APPLY TO DRY SKIN ON FEET AND LOWER LEGS   atorvastatin (LIPITOR) 20 mg tablet  Child, Self Yes No   Sig: Take 40 mg by mouth daily   donepezil (ARICEPT) 10 mg tablet   No No   Sig: Take 1 tablet (10 mg total) by mouth daily at bedtime   fenofibrate (TRICOR) 145 mg tablet  Child, Self Yes No   Sig: Take 145 mg by mouth daily   gabapentin (NEURONTIN) 100 mg capsule  Child, Self Yes No   Sig: Take 100 mg by mouth 3 (three) times a day   glimepiride (AMARYL) 4 mg tablet  Child, Self Yes No   Sig: Take 4 mg by mouth daily with breakfast   hydrochlorothiazide (HYDRODIURIL) 12.5 mg tablet  Child, Self Yes No   Sig: Take 12.5 mg by mouth daily   irbesartan (AVAPRO) 300 mg tablet  Child, Self Yes No   Sig: Take 300 mg by mouth daily   letrozole (FEMARA) 2.5 mg tablet  Child, Self No No   Sig: TAKE 1 TABLET BY MOUTH EVERY DAY   Patient not taking: Reported on 8/27/2024   magnesium oxide (MAG-OX) 400 mg tablet  Child, Self Yes No   Sig: Take 1 tablet by mouth daily   memantine (NAMENDA) 10 mg tablet   No No   Sig: TAKE 1 TABLET (10 MG TOTAL) BY MOUTH 2 (TWO) TIMES A DAY TO START AFTER FINISHING NAMENDA TITRATION PACK.   metFORMIN (GLUCOPHAGE) 1000 MG tablet  Child, Self Yes No   Sig: Take 1,000 mg by mouth 2 (two) times a day with meals      metoprolol succinate (TOPROL-XL) 50 mg 24 hr tablet  Child, Self Yes No   Sig: Take 75 mg by mouth daily   omeprazole (PriLOSEC) 40 MG capsule  Child, Self Yes No   Sig: Take 40 mg by mouth daily   Patient not taking: Reported on 8/27/2024   ondansetron (Zofran ODT) 4 mg disintegrating tablet  Child, Self No No   Sig: Take 1 tablet (4 mg total) by mouth every 8 (eight) hours as needed for nausea or vomiting   oxyCODONE-acetaminophen (Percocet) 5-325 mg per tablet  Child, Self No No   Sig: Take 1 tablet by mouth every 6 (six) hours as needed for moderate pain Max Daily Amount: 4 tablets   oxybutynin (DITROPAN) 5 mg tablet  Child, Self Yes No   Sig: TAKE 1 TABLET BY MOUTH EVERY DAY AT NIGHT   primidone (MYSOLINE) 50 mg tablet   No No   Sig: TAKE 2 TABLET in the morning and 2 tablets at nighttime   sitaGLIPtin (JANUVIA) 100 mg tablet  Child, Self Yes No   Sig: Take 100 mg by mouth daily      Facility-Administered Medications: None     Patient's Medications   Discharge Prescriptions    DEXTROMETHORPHAN-GUAIFENESIN (ROBITUSSIN DM)  MG/5 ML SYRUP    Take 10 mL by mouth 4 (four) times a day as needed for cough       Start Date: 1/11/2025 End Date: --       Order Dose: 10 mL       Quantity: 473 mL    Refills: 0    MAGNESIUM OXIDE -MG SUPPLEMENT 500 MG CAPS    Take 1 capsule (500 mg total) by mouth 3 (three) times a day       Start Date: 1/11/2025 End Date: 2/10/2025       Order Dose: 500 mg       Quantity: 90 capsule    Refills: 0     No discharge procedures on file.  ED SEPSIS DOCUMENTATION   Time reflects when diagnosis was documented in both MDM as applicable and the Disposition within this note       Time User Action Codes Description Comment    1/11/2025  3:41 AM Roberto Costa [U07.1] COVID-19     1/11/2025  3:41 AM Roberto Costa [W19.XXXA] Fall from standing, initial encounter     1/11/2025  3:41 AM Roberto Costa [E83.42] Hypomagnesemia                  Roberto Costa MD  01/11/25  0344       Roberto Costa MD  01/11/25 0611

## 2025-03-23 DIAGNOSIS — G31.84 MILD COGNITIVE IMPAIRMENT: ICD-10-CM

## 2025-03-24 RX ORDER — DONEPEZIL HYDROCHLORIDE 10 MG/1
10 TABLET, FILM COATED ORAL
Qty: 30 TABLET | Refills: 0 | Status: SHIPPED | OUTPATIENT
Start: 2025-03-24

## 2025-04-03 ENCOUNTER — OFFICE VISIT (OUTPATIENT)
Dept: NEUROLOGY | Facility: CLINIC | Age: 75
End: 2025-04-03
Payer: COMMERCIAL

## 2025-04-03 VITALS
HEIGHT: 62 IN | SYSTOLIC BLOOD PRESSURE: 136 MMHG | DIASTOLIC BLOOD PRESSURE: 68 MMHG | TEMPERATURE: 97.4 F | HEART RATE: 69 BPM | OXYGEN SATURATION: 96 % | RESPIRATION RATE: 18 BRPM | BODY MASS INDEX: 29.48 KG/M2 | WEIGHT: 160.2 LBS

## 2025-04-03 DIAGNOSIS — R41.3 MEMORY DIFFICULTY: Primary | ICD-10-CM

## 2025-04-03 DIAGNOSIS — R25.1 TREMOR: ICD-10-CM

## 2025-04-03 DIAGNOSIS — F41.1 GENERALIZED ANXIETY DISORDER: ICD-10-CM

## 2025-04-03 PROCEDURE — 99214 OFFICE O/P EST MOD 30 MIN: CPT | Performed by: PSYCHIATRY & NEUROLOGY

## 2025-04-03 RX ORDER — PIOGLITAZONE 15 MG/1
15 TABLET ORAL DAILY
COMMUNITY
Start: 2025-03-18 | End: 2026-03-18

## 2025-04-03 NOTE — ASSESSMENT & PLAN NOTE
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    Would recommend patient to go for cognitive therapy and was advised to take fall safety aspiration precautions and to be under constant supervision.  To go to the hospital if has any worsening symptoms and call me otherwise to see me back in 6 months or sooner if needed and follow-up with the other physicians..

## 2025-04-03 NOTE — ASSESSMENT & PLAN NOTE
Patient has benign essential tremor currently doing good on primidone of 100 mg twice a day she is also on Neurontin 100 mg 3 times a day but since patient's tremor is good and her neuropathy symptoms are under control the family would like to wait and see if they can decrease the gabapentin and slowly go off of that have advised them to decrease gabapentin by 100 mg of week to 2 weeks and completely off in the next 4 to 6 weeks and see how she does if she has any worsening symptoms then we can always put her back on the gabapentin she is also very discussed with the family physician prior to doing that.    She was advised to keep a blood pressure, cholesterol, sugar under control.

## 2025-04-03 NOTE — PROGRESS NOTES
Neurology Ambulatory Visit  Name: Lauren Das       : 1950       MRN: [id number]   Encounter Provider: Blayne Cardona MD   Encounter Date: 4/3/2025  Encounter department: [facility]      Lauren Das is a 74 y.o. female.       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    Would recommend patient to go for cognitive therapy and was advised to take fall safety aspiration precautions and to be under constant supervision.  To go to the hospital if has any worsening symptoms and call me otherwise to see me back in 6 months or sooner if needed and follow-up with the other physicians..  Tremor       Patient has benign essential tremor currently doing good on primidone of 100 mg twice a day she is also on Neurontin 100 mg 3 times a day but since patient's tremor is good and her neuropathy symptoms are under control the family would like to wait and see if they can decrease the gabapentin and slowly go off of that have advised them to decrease gabapentin by 100 mg of week to 2 weeks and completely off in the next 4 to 6 weeks and see how she does if she has any worsening symptoms then we can always put her back on the gabapentin she is also very discussed with the family physician prior to doing that.    She was advised to keep a blood pressure, cholesterol, sugar under  control.  Generalized anxiety disorder         Management as per family physician    Subjective:  Patient is here in follow-up for her memory difficulty and tremor    HISTORY OF PRESENT ILLNESS    Patient is here in follow-up accompanied with her son for a tremor and memory difficulty, since her last visit she tells me that she is doing better she still continues to have some short-term memory issues but overall she feels to be doing good she has a nurses aide at home 5 days a week and her son helps her also her tremor seems to be better it is mostly intentional it is not bothersome no difficulty in swallowing no resting tremor no early morning stiffness no freezing episodes she is on Neurontin 100 mg 3 times a day for her diabetic neuropathy and but does not complain of any numbness tingling in the patient and the son wants to go down on that and is tolerating it well she is on primidone 100 mg twice a day and seems to be doing good she continues to smoke she has been repeatedly told to quit smoking she understands this risk and is trying to quit smoking denies any bowel and bladder incontinence she has not had any falls no hallucinations mood is good appetite is good weight has been stable no other complaints.        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       Past Medical History:    Past Medical History:   Diagnosis Date    Anxiety     Breast cancer (HCC) 2022    right    Colon cancer (HCC) 2015    chemo and surgery    Depression     Diabetes mellitus (HCC)     Ductal carcinoma in situ (DCIS) of right breast 03/10/2022     History of chemotherapy     History of radiation therapy 2022    right breast    Hyperlipidemia     Hypertension     Skin cancer (melanoma) (HCC)      Past Surgical History:   Procedure Laterality Date    APPENDECTOMY      BACK SURGERY      BREAST BIOPSY Right 01/26/2022    right    BREAST LUMPECTOMY Right 3/1/2022    Procedure: RIGHT BREAST JOHN  DIRECTED LUMPECTOMY;  Surgeon: Dotty Ledezma MD;  Location: [facility];  Service: Surgical Oncology    BREAST LUMPECTOMY Right 4/19/2022    Procedure: LUMPECTOMY;  Surgeon: Dotty Ledezma MD;  Location: [facility];  Service: Surgical Oncology    CHOLECYSTECTOMY      COLON SURGERY      COLONOSCOPY      FL GUIDED CENTRAL VENOUS ACCESS DEVICE REMOVAL  7/24/2017    HYSTERECTOMY  2000    MAMMO NEEDLE LOCALIZATION LEFT (ALL INC) Right 2/24/2022    MAMMO STEREOTACTIC BREAST BIOPSY RIGHT (ALL INC) Right 1/26/2022    US GUIDED BREAST BIOPSY LEFT COMPLETE Left 1/26/2022       Family History:  Family History   Problem Relation Age of Onset    No Known Problems Mother     No Known Problems Father     No Known Problems Sister     Lung cancer Brother     No Known Problems Maternal Grandmother     No Known Problems Maternal Grandfather     No Known Problems Paternal Grandmother     No Known Problems Paternal Grandfather     No Known Problems Daughter     No Known Problems Maternal Aunt     No Known Problems Paternal Aunt     Other Paternal Aunt         oral cancer    Breast cancer Paternal Aunt         age unknown    Breast cancer Paternal Aunt         age unknown    Breast cancer Cousin         age unknown    Stomach cancer Paternal Uncle     No Known Problems Son     No Known Problems Son        Social History:  Social History     Tobacco Use    Smoking status: Every Day     Current packs/day: 1.00     Average packs/day: 1 pack/day for 57.3 years (57.3 ttl pk-yrs)     Types: Cigarettes     Start date: 1/1/1968    Smokeless tobacco: Never   Vaping Use     Vaping status: Never Used   Substance Use Topics    Alcohol use: Never    Drug use: Never      Living situation:    Work:      Allergies:  Allergies   Allergen Reactions    Codeine GI Intolerance    Morphine GI Intolerance       Medications:    Current Outpatient Medications:     amLODIPine (NORVASC) 2.5 mg tablet, Take 2.5 mg by mouth daily, Disp: , Rfl:     ammonium lactate (LAC-HYDRIN) 12 % cream, APPLY TO DRY SKIN ON FEET AND LOWER LEGS, Disp: , Rfl:     atorvastatin (LIPITOR) 20 mg tablet, Take 40 mg by mouth daily, Disp: , Rfl:     donepezil (ARICEPT) 10 mg tablet, TAKE 1 TABLET BY MOUTH DAILY AT BEDTIME, Disp: 30 tablet, Rfl: 0    fenofibrate (TRICOR) 145 mg tablet, Take 145 mg by mouth daily, Disp: , Rfl:     gabapentin (NEURONTIN) 100 mg capsule, Take 100 mg by mouth 3 (three) times a day, Disp: , Rfl:     glimepiride (AMARYL) 4 mg tablet, Take 4 mg by mouth daily with breakfast, Disp: , Rfl:     hydrochlorothiazide (HYDRODIURIL) 12.5 mg tablet, Take 12.5 mg by mouth daily, Disp: , Rfl:     irbesartan (AVAPRO) 300 mg tablet, Take 300 mg by mouth daily, Disp: , Rfl:     letrozole (FEMARA) 2.5 mg tablet, TAKE 1 TABLET BY MOUTH EVERY DAY, Disp: 30 tablet, Rfl: 0    Melatonin 10 MG TABS, Take 20 mg by mouth daily at bedtime as needed, Disp: , Rfl:     memantine (NAMENDA) 10 mg tablet, TAKE 1 TABLET (10 MG TOTAL) BY MOUTH 2 (TWO) TIMES A DAY TO START AFTER FINISHING NAMENDA TITRATION PACK., Disp: 180 tablet, Rfl: 2    metoprolol succinate (TOPROL-XL) 50 mg 24 hr tablet, Take 75 mg by mouth daily, Disp: , Rfl:     omeprazole (PriLOSEC) 40 MG capsule, Take 40 mg by mouth daily, Disp: , Rfl:     pioglitazone (ACTOS) 15 mg tablet, Take 15 mg by mouth daily, Disp: , Rfl:     primidone (MYSOLINE) 50 mg tablet, TAKE 2 TABLET in the morning and 2 tablets at nighttime, Disp: 360 tablet, Rfl: 1    sitaGLIPtin (JANUVIA) 100 mg tablet, Take 100 mg by mouth daily, Disp: , Rfl:     acetaminophen (TYLENOL) 650 mg CR tablet,  "Take 650 mg by mouth as needed for mild pain, Disp: , Rfl:     dextromethorphan-guaiFENesin (ROBITUSSIN DM)  mg/5 mL syrup, Take 10 mL by mouth 4 (four) times a day as needed for cough (Patient not taking: Reported on 4/3/2025), Disp: 473 mL, Rfl: 0    HYDROcodone-acetaminophen (Norco) 5-325 mg per tablet, Take 1 tablet by mouth every 6 (six) hours as needed for pain, Disp: 10 tablet, Rfl: 0    magnesium oxide (MAG-OX) 400 mg tablet, Take 1 tablet by mouth daily, Disp: , Rfl:     Magnesium Oxide -Mg Supplement 500 MG CAPS, Take 1 capsule (500 mg total) by mouth 3 (three) times a day, Disp: 90 capsule, Rfl: 0    metFORMIN (GLUCOPHAGE) 1000 MG tablet, Take 1,000 mg by mouth 2 (two) times a day with meals  , Disp: , Rfl:     ondansetron (Zofran ODT) 4 mg disintegrating tablet, Take 1 tablet (4 mg total) by mouth every 8 (eight) hours as needed for nausea or vomiting, Disp: 20 tablet, Rfl: 0    oxybutynin (DITROPAN) 5 mg tablet, TAKE 1 TABLET BY MOUTH EVERY DAY AT NIGHT, Disp: , Rfl:     oxyCODONE-acetaminophen (Percocet) 5-325 mg per tablet, Take 1 tablet by mouth every 6 (six) hours as needed for moderate pain Max Daily Amount: 4 tablets, Disp: 20 tablet, Rfl: 0    Objective:  /68 (BP Location: Right arm, Patient Position: Sitting, Cuff Size: Standard)   Pulse 69   Temp (!) 97.4 °F (36.3 °C) (Temporal)   Resp 18   Ht 5' 2\" (1.575 m)   LMP  (LMP Unknown)   SpO2 96%   BMI 29.56 kg/m²      Labs  I have reviewed pertinent labs:  CBC:   Lab Results   Component Value Date    WBC 9.27 01/11/2025    RBC 4.07 01/11/2025    HGB 11.8 01/11/2025    HCT 36.3 01/11/2025    MCV 89 01/11/2025     01/11/2025    MCH 29.0 01/11/2025    MCHC 32.5 01/11/2025    RDW 13.0 01/11/2025    MPV 10.3 01/11/2025    NEUTROABS 6.73 01/11/2025     CMP:   Lab Results   Component Value Date    SODIUM 136 03/18/2025    K 4.8 03/18/2025    CL 97 (L) 03/18/2025    CO2 28 03/18/2025    AGAP 11 03/18/2025    BUN 22 03/18/2025    " CREATININE 1.04 03/18/2025    GLUC 308 (H) 03/18/2025    GLUF 76 08/05/2022    CALCIUM 9.7 03/18/2025    AST 16 03/18/2025    ALT 14 03/18/2025    ALKPHOS 69 03/18/2025    TP 7 03/18/2025    ALB 4.5 03/18/2025    TBILI 0.4 03/18/2025    EGFR 57 (L) 03/18/2025     Thyroid Studies:   Lab Results   Component Value Date    GTM7NAGXZBGQ 1.234 01/11/2025     Vitamin D Level   Lab Results   Component Value Date    XHEL26BTOWDZ 15.0 (L) 12/21/2021     Vitamin B12   Lab Results   Component Value Date    HKVSOEHR50 342 12/21/2021     Folate   Lab Results   Component Value Date    FOLATE >20.0 (H) 12/21/2021              General Exam  GENERAL APPEARANCE:  No distress, alert, interactive and cooperative.  CARDIOVASCULAR: Warm and well perfused  LUNGS: normal work of breathing on room air  EXTREMITIES: no peripheral edema     Neurologic Exam  MENTAL STATE:  Orientation was normal to time, place and person without aphasia or apraxia.  MoCA is  24/30    CRANIAL NERVES:  CN 2       visual fields full to confrontation.  CN 3, 4, 6  Pupils round, 4 mm in diameter, equally reactive to light. Lids symmetric; no ptosis. EOMs normal alignment, full range. No nystagmus.  CN 5  Facial sensation intact bilaterally.  CN 7  Normal and symmetric facial strength.   CN 8  Hearing intact to finger rub bilaterally.  CN 9  Palate elevates symmetrically.  CN 11  Normal strength of shoulder shrug and neck turning.  CN 12  Tongue midline, with normal bulk and strength.     MOTOR:  Motor exam was normal. Muscle bulk and tone were normal in both upper and lower extremities. Muscle strength was 5/5 in distal and proximal muscles in both upper and lower extremities. No fasciculations, tremor or other abnormal movements were present.  Mild tremor of bilateral outstretched hands.     REFLEXES:  RIGHT UE   LEFT UE  BR:2              BR:2    Biceps:2      Biceps:2    Triceps:2     Triceps:2       RIGHT LLE   LEFT LLE    Knee:2           Knee:2    Ankle:2          Ankle:2       GAIT:  Gait was normal. Station was stable with a normal base. Gait was stable with a normal arm swing and speed.   ROS:  Review of Systems   Constitutional: Negative.  Negative for appetite change, fatigue and fever.   HENT: Negative.  Negative for hearing loss, tinnitus, trouble swallowing and voice change.    Eyes: Negative.  Negative for photophobia, pain and visual disturbance.   Respiratory: Negative.  Negative for shortness of breath.    Cardiovascular: Negative.  Negative for palpitations.   Gastrointestinal: Negative.  Negative for nausea and vomiting.   Endocrine: Negative.  Negative for cold intolerance.   Genitourinary: Negative.  Negative for dysuria, frequency and urgency.   Musculoskeletal: Negative.  Negative for back pain, gait problem, myalgias, neck pain and neck stiffness.   Skin: Negative.  Negative for rash.   Allergic/Immunologic: Negative.    Neurological: Negative.  Negative for dizziness, tremors, seizures, syncope, facial asymmetry, speech difficulty, weakness, light-headedness, numbness and headaches.   Hematological: Negative.  Does not bruise/bleed easily.   Psychiatric/Behavioral: Negative.  Negative for confusion, hallucinations and sleep disturbance.        I have reviewed the past medical history, surgical history, social and family history, current medications, allergies vitals, review of systems, and updated this information as appropriate today.    Administrative Statements   The total amount of time spent with the patient and on chart review and documentation was  25 minutes. Issues addressed during this clinic visit included overall management, medication counseling or monitoring, and counseling and coordination of care.         Blayne Cardona MD

## 2025-04-12 DIAGNOSIS — R25.1 TREMOR: ICD-10-CM

## 2025-04-14 RX ORDER — PRIMIDONE 50 MG/1
TABLET ORAL
Qty: 360 TABLET | Refills: 1 | Status: SHIPPED | OUTPATIENT
Start: 2025-04-14

## 2025-04-17 DIAGNOSIS — G31.84 MILD COGNITIVE IMPAIRMENT: ICD-10-CM

## 2025-04-18 RX ORDER — DONEPEZIL HYDROCHLORIDE 10 MG/1
10 TABLET, FILM COATED ORAL
Qty: 90 TABLET | Refills: 1 | Status: SHIPPED | OUTPATIENT
Start: 2025-04-18

## 2025-05-29 ENCOUNTER — HOSPITAL ENCOUNTER (OUTPATIENT)
Dept: MAMMOGRAPHY | Facility: CLINIC | Age: 75
Discharge: HOME | End: 2025-05-29
Payer: COMMERCIAL

## 2025-05-29 ENCOUNTER — HOSPITAL ENCOUNTER (OUTPATIENT)
Dept: ULTRASOUND IMAGING | Facility: CLINIC | Age: 75
Discharge: HOME | End: 2025-05-29
Payer: COMMERCIAL

## 2025-05-29 VITALS — WEIGHT: 160 LBS | HEIGHT: 62 IN | BODY MASS INDEX: 29.44 KG/M2

## 2025-05-29 DIAGNOSIS — R92.8 ABNORMAL MAMMOGRAM: ICD-10-CM

## 2025-05-29 PROCEDURE — 77066 DX MAMMO INCL CAD BI: CPT

## 2025-05-29 PROCEDURE — 76642 ULTRASOUND BREAST LIMITED: CPT

## 2025-05-29 PROCEDURE — G0279 TOMOSYNTHESIS, MAMMO: HCPCS

## 2025-05-29 NOTE — PROGRESS NOTES
Met with patient and Dr. Templeton regarding recommendation for;    __X___ RIGHT ______LEFT      __X___Ultrasound guided  ______Stereotactic breast biopsy.      __X___Verbalized understanding.    Reviewed clip placement with patient, pt states understanding: Yes: _X___ No: ____  Comments:    Blood thinners:  No: __X___ Yes: ______ What:          Biopsy teaching sheet given:  Yes: ___X___ No: ________    Pt given contact information and adv to call with any questions/needs    Patient advised to arrive at 1330 for a 1400 appointment

## 2025-05-30 ENCOUNTER — TELEPHONE (OUTPATIENT)
Dept: SURGICAL ONCOLOGY | Facility: CLINIC | Age: 75
End: 2025-05-30

## 2025-05-30 NOTE — TELEPHONE ENCOUNTER
Left message for patient to make follow up appointment after biopsy on July 3rd. Left hopeline number to call back to make appointment.      Patient should be schedule one week after biopsy.

## 2025-06-03 ENCOUNTER — TELEPHONE (OUTPATIENT)
Dept: SURGICAL ONCOLOGY | Facility: CLINIC | Age: 75
End: 2025-06-03

## 2025-06-03 NOTE — TELEPHONE ENCOUNTER
2nd message left for patient to make follow up appointment with dr. Ledezma after biopsy on July 3rd. Left hopeline number to call back to make appointment.        Patient should be schedule one week after biopsy.

## 2025-06-19 DIAGNOSIS — R41.3 MEMORY DIFFICULTY: ICD-10-CM

## 2025-06-19 RX ORDER — MEMANTINE HYDROCHLORIDE 10 MG/1
TABLET ORAL
Qty: 180 TABLET | Refills: 1 | Status: SHIPPED | OUTPATIENT
Start: 2025-06-19

## 2025-07-03 ENCOUNTER — HOSPITAL ENCOUNTER (OUTPATIENT)
Dept: MAMMOGRAPHY | Facility: CLINIC | Age: 75
End: 2025-07-03
Payer: COMMERCIAL

## 2025-07-03 ENCOUNTER — HOSPITAL ENCOUNTER (OUTPATIENT)
Dept: ULTRASOUND IMAGING | Facility: CLINIC | Age: 75
End: 2025-07-03
Attending: SURGERY
Payer: COMMERCIAL

## 2025-07-03 VITALS — HEART RATE: 69 BPM | SYSTOLIC BLOOD PRESSURE: 150 MMHG | DIASTOLIC BLOOD PRESSURE: 84 MMHG

## 2025-07-03 DIAGNOSIS — R92.8 ABNORMAL MAMMOGRAM: ICD-10-CM

## 2025-07-03 PROCEDURE — 88342 IMHCHEM/IMCYTCHM 1ST ANTB: CPT | Performed by: PATHOLOGY

## 2025-07-03 PROCEDURE — A4648 IMPLANTABLE TISSUE MARKER: HCPCS

## 2025-07-03 PROCEDURE — 88341 IMHCHEM/IMCYTCHM EA ADD ANTB: CPT | Performed by: PATHOLOGY

## 2025-07-03 PROCEDURE — 19083 BX BREAST 1ST LESION US IMAG: CPT

## 2025-07-03 PROCEDURE — 88305 TISSUE EXAM BY PATHOLOGIST: CPT | Performed by: PATHOLOGY

## 2025-07-03 RX ORDER — LIDOCAINE HYDROCHLORIDE 10 MG/ML
5 INJECTION, SOLUTION EPIDURAL; INFILTRATION; INTRACAUDAL; PERINEURAL ONCE
Status: COMPLETED | OUTPATIENT
Start: 2025-07-03 | End: 2025-07-03

## 2025-07-03 RX ADMIN — LIDOCAINE HYDROCHLORIDE 5 ML: 10 INJECTION, SOLUTION EPIDURAL; INFILTRATION; INTRACAUDAL; PERINEURAL at 14:21

## 2025-07-03 RX ADMIN — LIDOCAINE HYDROCHLORIDE 5 ML: 10 INJECTION, SOLUTION EPIDURAL; INFILTRATION; INTRACAUDAL; PERINEURAL at 14:24

## 2025-07-03 NOTE — PROGRESS NOTES
Ice pack given:    __X___yes _____no    Discharge instructions reviewed and given to patient:    __X___yes _____no    Discharged via:    __X___amulatory    _____wheelchair    _____stretcher    Biopsy site clean and dry with no bleeding on discharge:    __X___yes ____no  Patient is to get results from Dr. Reuben Ledezma.  Son present for post instructions.  Ok to leave a message/

## 2025-07-03 NOTE — PROGRESS NOTES
Procedure type:    __x___ultrasound guided _____stereotactic _____ MRI guided    Breast:    _____Left __x___Right    Location: 9 o'clock 6 cmfn     Needle: 14 gauge magaly     # of passes: 4    Clip: Hydromark butterfly     Performed by: Dr. Sulma Meza     Pressure held for 5 minutes by: Nathalie Mirza Strips:    ___x__yes _____no    Band aid:    ___x__yes_____no    Tape and guaze:    _____yes ___x__no    Tolerated procedure:    ___x__yes _____no

## 2025-07-07 NOTE — PROGRESS NOTES
Post procedure call completed 7/7/2025 at 1314 - per patient's son    Bleeding: _____yes __X___no (pt denies)    Pain: _____yes ___X___no (pt denies, used ice, no need for OTC pain meds)    Redness/Swelling: ______yes ___X___no (pt denies)    Band aid removed: _____yes ___X__no (patient's sonjack is unsure if bandaid is off - advised to ask patient and remove if still on)    Steri-Strips intact: ___X___yes _____no (discussed with patient to remove steri strips on tuesday if they have not come off on their own)    Pt with no questions at this time, adv will call when results available, adv to call with any questions or concerns, has name/# for contact

## 2025-07-08 ENCOUNTER — TELEPHONE (OUTPATIENT)
Dept: SURGICAL ONCOLOGY | Facility: CLINIC | Age: 75
End: 2025-07-08

## 2025-07-08 ENCOUNTER — TELEPHONE (OUTPATIENT)
Age: 75
End: 2025-07-08

## 2025-07-08 ENCOUNTER — TELEPHONE (OUTPATIENT)
Dept: MAMMOGRAPHY | Facility: CLINIC | Age: 75
End: 2025-07-08

## 2025-07-08 PROBLEM — Z08 ENCOUNTER FOR FOLLOW-UP SURVEILLANCE OF BREAST CANCER: Status: ACTIVE | Noted: 2025-07-08

## 2025-07-08 PROBLEM — Z85.3 ENCOUNTER FOR FOLLOW-UP SURVEILLANCE OF BREAST CANCER: Status: ACTIVE | Noted: 2025-07-08

## 2025-07-08 PROCEDURE — 88342 IMHCHEM/IMCYTCHM 1ST ANTB: CPT | Performed by: PATHOLOGY

## 2025-07-08 PROCEDURE — 88341 IMHCHEM/IMCYTCHM EA ADD ANTB: CPT | Performed by: PATHOLOGY

## 2025-07-08 PROCEDURE — 88305 TISSUE EXAM BY PATHOLOGIST: CPT | Performed by: PATHOLOGY

## 2025-07-08 NOTE — TELEPHONE ENCOUNTER
Patient picked up: prescription.   Identity was verified: Yes.    Reached out to Liyah RAMIREZ, Dr. Ledezma's office nurse, via secure chat to advise path results are back for this patient. Radiologists recommendations are to return to routine screening. Office RN to call patients son with results.

## 2025-07-08 NOTE — TELEPHONE ENCOUNTER
Returned sons phone call. Reviewed benign biopsy results. Confirmed appointment with Dr. Ledezma for tomorrow.

## 2025-07-08 NOTE — TELEPHONE ENCOUNTER
"Pt's Son \"Bob Choi\"         [phone] called.  He just missed Santosh Sellers's, RN phone call to review pathology results.    Bob will have his phone with him to accept this call.      "

## 2025-07-09 ENCOUNTER — OFFICE VISIT (OUTPATIENT)
Dept: SURGICAL ONCOLOGY | Facility: CLINIC | Age: 75
End: 2025-07-09
Payer: COMMERCIAL

## 2025-07-09 VITALS
HEART RATE: 88 BPM | DIASTOLIC BLOOD PRESSURE: 72 MMHG | SYSTOLIC BLOOD PRESSURE: 140 MMHG | OXYGEN SATURATION: 98 % | BODY MASS INDEX: 29.53 KG/M2 | HEIGHT: 62 IN | WEIGHT: 160.5 LBS | TEMPERATURE: 97.8 F

## 2025-07-09 DIAGNOSIS — Z85.038 HISTORY OF COLON CANCER, STAGE III: ICD-10-CM

## 2025-07-09 DIAGNOSIS — Z98.890 HISTORY OF LUMPECTOMY OF RIGHT BREAST: ICD-10-CM

## 2025-07-09 DIAGNOSIS — D05.11 DUCTAL CARCINOMA IN SITU (DCIS) OF RIGHT BREAST: ICD-10-CM

## 2025-07-09 DIAGNOSIS — Z08 ENCOUNTER FOR FOLLOW-UP SURVEILLANCE OF BREAST CANCER: Primary | ICD-10-CM

## 2025-07-09 DIAGNOSIS — Z85.3 ENCOUNTER FOR FOLLOW-UP SURVEILLANCE OF BREAST CANCER: Primary | ICD-10-CM

## 2025-07-09 DIAGNOSIS — Z12.31 ENCOUNTER FOR SCREENING MAMMOGRAM FOR MALIGNANT NEOPLASM OF BREAST: ICD-10-CM

## 2025-07-09 DIAGNOSIS — R41.3 MEMORY DIFFICULTY: ICD-10-CM

## 2025-07-09 DIAGNOSIS — N60.91 ATYPICAL DUCTAL HYPERPLASIA OF RIGHT BREAST: ICD-10-CM

## 2025-07-09 DIAGNOSIS — Z85.3 HISTORY OF BREAST CANCER: ICD-10-CM

## 2025-07-09 PROCEDURE — 99215 OFFICE O/P EST HI 40 MIN: CPT | Performed by: SURGERY

## 2025-07-09 NOTE — ASSESSMENT & PLAN NOTE
Orders:    Mammo screening bilateral w 3d and cad; Future     ----- Message from Fay Scheuermann, DO sent at 9/4/2020  8:35 AM EDT -----  No change in pulmonary nodule since 2019 which is good  Nodule is very small and not concerning at all

## 2025-07-09 NOTE — PROGRESS NOTES
Name: Lauren Das      : 1950      MRN: [id number]  Encounter Provider: Dotty Ledezma MD  Encounter Date: 2025   Encounter department: CANCER CARE [facility] SURGICAL ONCOLOGY [place]  :  Assessment & Plan  Encounter for follow-up surveillance of breast cancer    Orders:    Mammo screening bilateral w 3d and cad; Future    History of breast cancer         Ductal carcinoma in situ (DCIS) of right breast    Orders:    Mammo screening bilateral w 3d and cad; Future    Atypical ductal hyperplasia of right breast         History of lumpectomy of right breast         Memory difficulty         History of colon cancer, stage III         Encounter for screening mammogram for malignant neoplasm of breast    Orders:    Mammo screening bilateral w 3d and cad; Future    This is a 74-year-old female with history of right breast cancer recently she had s/p breast conservation surgery and radiation.  She has discontinued her letrozole due to side effect.  She recently had an abnormal mammogram which was followed by biopsy biopsies benign postoperative changes no evidence of malignancy and concordant.  We will order her next mammogram.  Will also see her in 6 months        History of Present Illness   Lauren Das is a 74 y.o. year old female who presents for follow-up with right breast cancer with history of colon cancer as well as melanoma.     Oncology History   Cancer Staging   No matching staging information was found for the patient.  Oncology History Overview Note    with DCIS right breast successfully underwent lumpectomy in March repeated in April for clear margin.  This was low-grade however the Dicsion RT shows an elevated score. She is maintained on letrozole. She finished adjuvant radiation therapy on 22 She was last seen 22 and presents today for follow up.    23 Neurology, Brendan  Mild cognitive impairment   Continue with Aricept 10 mg a day.  Continue with  mentally stimulating exercises.  Increase primidone to 50 mg twice a day.  Continue with Neurontin 100 mg 3 times a day.  Patient was strongly advised to quit smoking.  To take fall and safety precautions.    23 ED- nausea    23 MRI brain NeuroQuant wo contrast  White matter changes suggestive of chronic microangiopathy.  No acute intracranial pathology.   NeuroQuant analysis was performed: Low normal hippocampal volume and enlarged inferior lateral and overall ventricular system, suggestive of global ex-vacuo dilatation.  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  Although the hippocampal volume has slightly increased since the prior study, this is thought to be technical factors related to patient motion.    23 Diagnostic B/L mammogram  Right breast postsurgical scarring and distortion have expected postoperative appearance.  Are no suspicious masses, grouped microcalcifications or areas of unexplained architectural distortion. The skin and nipple areolar complex are unremarkable.   RECOMMENDATION:       - Diagnostic mammogram in 1 year for both breasts.      3/1/23 Dr. Ledezma      Upcomin23 Dr. Ness     Ductal carcinoma in situ (DCIS) of right breast   2022 Biopsy    Right breast stereotactic biopsy  1 o'clock  Breast tissue with focal atypical ductal hyperplasia, usual ductal hyperplasia, stromal fibrosis, and sclerosing adenosis with microcalcifications   Negative for in-situ or invasive carcinoma    Concordant. Biopsy clip is anterior to central aspect of architectural distortion. Lia  reflector was intentionally targeted posterior to biopsy clip, midway between the distortion and the biopsy clip.      3/1/2022 Surgery    Right breast lia  directed lumpectomy  Ductal carcinoma in situ  4.4 cm  Margins negative - distance from DCIS to anterior margin is less  than 1 mm, distance from DCIS to lateral margin less than 2 mm    ER 90  ME 5     3/1/2022 Initial Diagnosis    Ductal carcinoma in situ (DCIS) of right breast     3/14/2022 Genetic Testing    [facility]  A total of 36 genes were evaluated, including: JOLANTA, BRCA1, BRCA2, CDH1, CHEK2, PALB2, PTEN, STK11, TP53  Negative result. No pathogenic sequence variants or deletions/duplications identified     3/14/2022 Genomic Testing    DCISionRT   Addendum   SCORE 3.9      4/19/2022 Surgery    Right breast lumpectomy  Residual focus of DCIS cribriform pattern  Low nuclear grade  Superior margin is negative for tumor by 6.0 mm   The remaining surgical margins are negative for tumor by wider range     7/13/2022 - 8/11/2022 Radiation    Treatments:  Course: C1    Plan ID Energy Fractions Dose per Fraction (cGy) Dose Correction (cGy) Total Dose Delivered (cGy) Elapsed Days   R Breast 6X 16 / 16 266 0 4,256 22   R Breast e 9E 5 / 5 200 0 1,000 6      Treatment Dates:  7/13/2022 - 8/11/2022.           Review of Systems   Constitutional:  Negative for chills and fever.   HENT:  Negative for ear pain and sore throat.    Eyes:  Negative for pain and visual disturbance.   Respiratory:  Negative for cough and shortness of breath.    Cardiovascular:  Negative for chest pain and palpitations.   Gastrointestinal:  Negative for abdominal pain and vomiting.   Genitourinary:  Negative for dysuria and hematuria.   Musculoskeletal:  Negative for arthralgias and back pain.   Skin:  Negative for color change and rash.   Neurological:  Negative for seizures and syncope.   All other systems reviewed and are negative.   A complete review of systems is negative other than that noted above in the HPI.    Medical History Reviewed by provider this encounter:  Tobacco  Allergies  Meds  Problems  Med Hx  Surg Hx  Fam Hx     .       Objective   /72 (BP Location: Left arm, Patient Position: Sitting)   Pulse 88   Temp 97.8 °F (36.6 °C) (Temporal)   " Ht 5' 2\" (1.575 m)   Wt 72.8 kg (160 lb 8 oz)   LMP  (LMP Unknown)   SpO2 98%   BMI 29.36 kg/m²     Pain Screening:     ECOG    Physical Exam  Constitutional:       Appearance: Normal appearance.   HENT:      Head: Normocephalic and atraumatic.      Nose: Nose normal.      Mouth/Throat:      Mouth: Mucous membranes are moist.     Eyes:      Pupils: Pupils are equal, round, and reactive to light.       Cardiovascular:      Rate and Rhythm: Normal rate.      Pulses: Normal pulses.      Heart sounds: Normal heart sounds.   Pulmonary:      Effort: Pulmonary effort is normal.      Breath sounds: Normal breath sounds.   Chest:        Comments: Well-healed right breast incision.  Bilateral breast examination no palpable mass or masses.  Recent biopsy results were reviewed and no evidence of malignancy.  Bilateral axillary and supraclavicular examination no palpable adenopathy.  Patient was examined seated as well as supine position  Abdominal:      General: Bowel sounds are normal.      Palpations: Abdomen is soft.     Musculoskeletal:         General: Normal range of motion.      Cervical back: Normal range of motion and neck supple.     Skin:     General: Skin is warm.     Neurological:      General: No focal deficit present.      Mental Status: She is alert and oriented to person, place, and time.     Psychiatric:         Mood and Affect: Mood normal.         Behavior: Behavior normal.         Thought Content: Thought content normal.         Judgment: Judgment normal.          Labs: I have reviewed pertinent labs.   Lab Results   Component Value Date/Time    WBC 9.27 01/11/2025 01:46 AM    RBC 4.07 01/11/2025 01:46 AM    Hemoglobin 11.8 01/11/2025 01:46 AM    Hematocrit 36.3 01/11/2025 01:46 AM    MCV 89 01/11/2025 01:46 AM    MCH 29.0 01/11/2025 01:46 AM    RDW 13.0 01/11/2025 01:46 AM    Platelets 237 01/11/2025 01:46 AM    Segmented % 74 01/11/2025 01:46 AM    Bands % 3 11/12/2024 12:05 AM    Lymphocytes % 12 " (L) 01/11/2025 01:46 AM    Monocytes % 14 (H) 01/11/2025 01:46 AM    Eosinophils Relative 0 01/11/2025 01:46 AM    Basophils Relative 0 01/11/2025 01:46 AM    Immature Grans % 0 01/11/2025 01:46 AM    Absolute Neutrophils 6.73 01/11/2025 01:46 AM      Lab Results   Component Value Date/Time    Sodium 136 03/18/2025 09:47 AM    Potassium 4.8 03/18/2025 09:47 AM    Chloride 97 (L) 03/18/2025 09:47 AM    Carbon Dioxide 28 03/18/2025 09:47 AM    ANION GAP 11 03/18/2025 09:47 AM    BUN 22 03/18/2025 09:47 AM    Creatinine 1.04 03/18/2025 09:47 AM    Glucose 308 (H) 03/18/2025 09:47 AM    Calcium 9.7 03/18/2025 09:47 AM    AST 16 03/18/2025 09:47 AM    ALT 14 03/18/2025 09:47 AM    Alkaline Phosphatase 69 03/18/2025 09:47 AM    Protein, Total 7 03/18/2025 09:47 AM    ALBUMIN 4.5 03/18/2025 09:47 AM    Total Bilirubin 0.4 03/18/2025 09:47 AM    eGFRcr 57 (L) 03/18/2025 09:47 AM      Hospital Outpatient Visit on 07/03/2025   Component Date Value Ref Range Status    Case Report 07/03/2025    Final                    Value:Surgical Pathology Report                         Case: [id number]                                  Authorizing Provider:  Dotty Ledezma,  Collected:           07/03/2025 1425                                     MD                                                                           Ordering Location:     [facility] Received:            07/03/2025 1536                                     [facility]                                                              Pathologist:           Alexey Colorado DO                                                            Specimen:    Breast, Right, US right brst bx 900 6cmfn, 4 passes, 14g marquee                           Final Diagnosis 07/03/2025    Final                    Value:A. Breast, right, 9:00, 6 cm from the nipple, ultrasound-guided core needle biopsy:  -Benign breast with focal sclerosing adenosis, dense stromal fibrosis  "and focal foreign body giant cell/histiocytic reaction, consistent with previous surgical site changes.  -Rare microcalcifications present, associated with benign ducts and stroma.      Note 07/03/2025    Final                    Value:Myoepithelial markers (p63, [facility]) performed on block Block A1 are intact. CK AE1/AE3 performed on block A2 highlights rare benign duct and CD68 highlight histiocytes.    Deeper levels examined A1 and A2.      Additional Information 07/03/2025    Final                    Value:All reported additional testing was performed with appropriately reactive controls.  These tests were developed and their performance characteristics determined by [facility] or appropriate performing facility, though some tests may be performed on tissues which have not been validated for performance characteristics (such as staining performed on alcohol exposed cell blocks and decalcified tissues).  Results should be interpreted with caution and in the context of the patients’ clinical condition. These tests may not be cleared or approved by the U.S. Food and Drug Administration, though the FDA has determined that such clearance or approval is not necessary. These tests are used for clinical purposes and they should not be regarded as investigational or for research. This laboratory has been approved by CLIA 88, designated as a high-complexity laboratory and is qualified to perform these tests.    Interpretation performed at [facility], [facility] [address]      Gross Description 07/03/2025    Final                    Value:A. The specimen is received in formalin, labeled with the patient's name and hospital number, and is designated \" US right breast biopsy 900 6 cmfn, 4 passes, 14G marquee\".  The specimen consists of 3 tan cores of filiform and friable soft tissue measuring less than 0.1-0.1 cm in diameter and ranging from less than 0.1-0.3 " 0.8-1.7 cm in length.  Entirely submitted. Two cassettes.  Between sponges.  Note: due to the size and consistency of the specimen, the tissue may not survive histologic processing.    The cold ischemia time based upon information provided by the submitting clinician and receiving staff in the laboratory is within 1 minute.      Note: The estimated total formalin fixation time based upon information provided by the submitting clinician and the standard processing schedule is 46.25 hours.    -[facility]      Clinical Information 07/03/2025    Final                    Value:Please contact Minda Flores at [facility] with results at [phone]     Pathology: I have reviewed pathology reports described above.    Radiology Results Review: I personally reviewed the following image studies in PACS and associated radiology reports: Mammogram. My interpretation of the radiology images/reports is: Mammograms were reviewed and recent biopsy also reviewed.  Pathology and mammogram findings are concordant.  Will order her next mammogram in 1 year..  Concordance: yes    Administrative Statements   I have spent a total time of 40 minutes in caring for this patient on the day of the visit/encounter including Diagnostic results, Prognosis, Risks and benefits of tx options, Instructions for management, Patient and family education, Impressions, Counseling / Coordination of care, Documenting in the medical record, Reviewing/placing orders in the medical record (including tests, medications, and/or procedures), and Obtaining or reviewing history  .

## (undated) DEVICE — MAYO STAND COVER: Brand: CONVERTORS

## (undated) DEVICE — BETHLEHEM UNIVERSAL MINOR GEN: Brand: CARDINAL HEALTH

## (undated) DEVICE — SUT SILK 2-0 SH 30 IN K833H

## (undated) DEVICE — SMOKE EVACUATION TUBING WITH 8 IN INTEGRAL WAND AND SPONGE GUARD: Brand: BUFFALO FILTER

## (undated) DEVICE — BRA SURGICAL SZ LGE (36-39)

## (undated) DEVICE — SKIN MARKER DUAL TIP WITH RULER CAP, FLEXIBLE RULER AND LABELS: Brand: DEVON

## (undated) DEVICE — MARGIN MARKER SET

## (undated) DEVICE — SHEATH, GUIDE, SAVI SCOUT®: Brand: SAVI SCOUT®

## (undated) DEVICE — TELFA ADHESIVE ISLAND DRESSING: Brand: TELFA

## (undated) DEVICE — SUT VICRYL 3-0 SH 27 IN J416H

## (undated) DEVICE — ELECTRODE BLADE MOD E-Z CLEAN  2.75IN 7CM -0012AM

## (undated) DEVICE — SUT VICRYL 2-0 SH 27 IN UNDYED J417H

## (undated) DEVICE — DRAPE EQUIPMENT RF WAND

## (undated) DEVICE — FINGER TUBING + CABLE MANAGER

## (undated) DEVICE — TUBING SUCTION 5MM X 12 FT

## (undated) DEVICE — DRAPE SHEET THREE QUARTER

## (undated) DEVICE — LIGHT HANDLE COVER SLEEVE DISP BLUE STELLAR

## (undated) DEVICE — INTENDED FOR TISSUE SEPARATION, AND OTHER PROCEDURES THAT REQUIRE A SHARP SURGICAL BLADE TO PUNCTURE OR CUT.: Brand: BARD-PARKER SAFETY BLADES SIZE 15, STERILE

## (undated) DEVICE — ELECTRODE EZ CLEAN BLADE -0012

## (undated) DEVICE — CHLORAPREP HI-LITE 26ML ORANGE

## (undated) DEVICE — GLOVE SRG BIOGEL ORTHOPEDIC 7.5

## (undated) DEVICE — ADHESIVE SKIN CLOSR DERMABOND PRINEO

## (undated) DEVICE — PLUMEPEN PRO 10FT

## (undated) DEVICE — CHEST/BREAST DRAPE: Brand: CONVERTORS

## (undated) DEVICE — NEEDLE 25G X 1 1/2

## (undated) DEVICE — PENCIL ELECTROSURG E-Z CLEAN -0035H

## (undated) DEVICE — SYRINGE 3ML LL

## (undated) DEVICE — SUT MONOCRYL 4-0 PS-2 18 IN Y496G

## (undated) DEVICE — UTILITY MARKER,BLACK WITH LABELS: Brand: DEVON